# Patient Record
Sex: FEMALE | Race: BLACK OR AFRICAN AMERICAN | NOT HISPANIC OR LATINO | Employment: OTHER | URBAN - METROPOLITAN AREA
[De-identification: names, ages, dates, MRNs, and addresses within clinical notes are randomized per-mention and may not be internally consistent; named-entity substitution may affect disease eponyms.]

---

## 2013-09-17 LAB — CRC RECOMMENDATION EXT: NORMAL

## 2017-01-30 ENCOUNTER — HISTORICAL (OUTPATIENT)
Dept: LAB | Facility: HOSPITAL | Age: 62
End: 2017-01-30

## 2017-03-31 ENCOUNTER — HISTORICAL (OUTPATIENT)
Dept: LAB | Facility: HOSPITAL | Age: 62
End: 2017-03-31

## 2017-05-31 ENCOUNTER — HISTORICAL (OUTPATIENT)
Dept: ADMINISTRATIVE | Facility: HOSPITAL | Age: 62
End: 2017-05-31

## 2017-06-20 ENCOUNTER — HISTORICAL (OUTPATIENT)
Dept: ADMINISTRATIVE | Facility: HOSPITAL | Age: 62
End: 2017-06-20

## 2017-07-07 ENCOUNTER — HISTORICAL (OUTPATIENT)
Dept: ADMINISTRATIVE | Facility: HOSPITAL | Age: 62
End: 2017-07-07

## 2017-07-07 LAB
ALBUMIN SERPL-MCNC: 4 GM/DL (ref 3.4–5)
ALBUMIN/GLOB SERPL: 1 {RATIO}
ALP SERPL-CCNC: 76 UNIT/L (ref 38–126)
ALT SERPL-CCNC: 27 UNIT/L (ref 12–78)
AST SERPL-CCNC: 24 UNIT/L (ref 15–37)
BILIRUB SERPL-MCNC: 0.4 MG/DL (ref 0.2–1)
BILIRUBIN DIRECT+TOT PNL SERPL-MCNC: 0.1 MG/DL (ref 0–0.5)
BILIRUBIN DIRECT+TOT PNL SERPL-MCNC: 0.3 MG/DL (ref 0–0.8)
BUN SERPL-MCNC: 21 MG/DL (ref 7–18)
CALCIUM SERPL-MCNC: 9.2 MG/DL (ref 8.5–10.1)
CHLORIDE SERPL-SCNC: 107 MMOL/L (ref 98–107)
CHOLEST SERPL-MCNC: 269 MG/DL (ref 0–200)
CHOLEST/HDLC SERPL: 3.9 {RATIO} (ref 0–4)
CO2 SERPL-SCNC: 24 MMOL/L (ref 21–32)
CREAT SERPL-MCNC: 1.38 MG/DL (ref 0.55–1.02)
CREAT UR-MCNC: 290 MG/DL
EST. AVERAGE GLUCOSE BLD GHB EST-MCNC: 128 MG/DL
GLOBULIN SER-MCNC: 4 GM/DL (ref 2.4–3.5)
GLUCOSE SERPL-MCNC: 83 MG/DL (ref 74–106)
HBA1C MFR BLD: 6.1 % (ref 4.2–6.3)
HDLC SERPL-MCNC: 69 MG/DL (ref 35–60)
LDLC SERPL CALC-MCNC: 170 MG/DL (ref 0–129)
MICROALBUMIN UR-MCNC: 2.8 MG/DL
MICROALBUMIN/CREAT RATIO PNL UR: 9.7 MG/GM CR (ref 0–30)
POTASSIUM SERPL-SCNC: 4.8 MMOL/L (ref 3.5–5.1)
PROT SERPL-MCNC: 8 GM/DL (ref 6.4–8.2)
SODIUM SERPL-SCNC: 139 MMOL/L (ref 136–145)
TRIGL SERPL-MCNC: 151 MG/DL (ref 30–150)
VLDLC SERPL CALC-MCNC: 30 MG/DL

## 2017-07-12 ENCOUNTER — HISTORICAL (OUTPATIENT)
Dept: ADMINISTRATIVE | Facility: HOSPITAL | Age: 62
End: 2017-07-12

## 2017-07-19 ENCOUNTER — HISTORICAL (OUTPATIENT)
Dept: ADMINISTRATIVE | Facility: HOSPITAL | Age: 62
End: 2017-07-19

## 2017-08-02 ENCOUNTER — HISTORICAL (OUTPATIENT)
Dept: ADMINISTRATIVE | Facility: HOSPITAL | Age: 62
End: 2017-08-02

## 2017-08-16 ENCOUNTER — HISTORICAL (OUTPATIENT)
Dept: ADMINISTRATIVE | Facility: HOSPITAL | Age: 62
End: 2017-08-16

## 2017-08-30 ENCOUNTER — HISTORICAL (OUTPATIENT)
Dept: ADMINISTRATIVE | Facility: HOSPITAL | Age: 62
End: 2017-08-30

## 2017-11-06 ENCOUNTER — HISTORICAL (OUTPATIENT)
Dept: RADIOLOGY | Facility: HOSPITAL | Age: 62
End: 2017-11-06

## 2018-04-03 ENCOUNTER — HISTORICAL (OUTPATIENT)
Dept: LAB | Facility: HOSPITAL | Age: 63
End: 2018-04-03

## 2018-04-03 LAB
ABS NEUT (OLG): 4.76 X10(3)/MCL (ref 2.1–9.2)
ALBUMIN SERPL-MCNC: 4.3 GM/DL (ref 3.4–5)
ALBUMIN/GLOB SERPL: 1.1 {RATIO}
ALP SERPL-CCNC: 73 UNIT/L (ref 38–126)
ALT SERPL-CCNC: 17 UNIT/L (ref 12–78)
APPEARANCE, UA: ABNORMAL
AST SERPL-CCNC: 15 UNIT/L (ref 15–37)
BACTERIA SPEC CULT: ABNORMAL /HPF
BASOPHILS # BLD AUTO: 0 X10(3)/MCL (ref 0–0.2)
BASOPHILS NFR BLD AUTO: 0 %
BILIRUB SERPL-MCNC: 0.6 MG/DL (ref 0.2–1)
BILIRUB UR QL STRIP: NEGATIVE
BILIRUBIN DIRECT+TOT PNL SERPL-MCNC: 0.1 MG/DL (ref 0–0.2)
BILIRUBIN DIRECT+TOT PNL SERPL-MCNC: 0.5 MG/DL (ref 0–0.8)
BUN SERPL-MCNC: 20 MG/DL (ref 7–18)
CALCIUM SERPL-MCNC: 9.3 MG/DL (ref 8.5–10.1)
CHLORIDE SERPL-SCNC: 109 MMOL/L (ref 98–107)
CHOLEST SERPL-MCNC: 282 MG/DL (ref 0–200)
CHOLEST/HDLC SERPL: 4.4 {RATIO} (ref 0–4)
CK SERPL-CCNC: 81 UNIT/L (ref 26–192)
CO2 SERPL-SCNC: 24 MMOL/L (ref 21–32)
COLOR UR: YELLOW
CREAT SERPL-MCNC: 1.11 MG/DL (ref 0.55–1.02)
CREAT UR-MCNC: 233 MG/DL
DEPRECATED CALCIDIOL+CALCIFEROL SERPL-MC: 36 NG/ML (ref 30–80)
EOSINOPHIL # BLD AUTO: 0.2 X10(3)/MCL (ref 0–0.9)
EOSINOPHIL NFR BLD AUTO: 3 %
ERYTHROCYTE [DISTWIDTH] IN BLOOD BY AUTOMATED COUNT: 14.1 % (ref 11.5–17)
EST. AVERAGE GLUCOSE BLD GHB EST-MCNC: 123 MG/DL
GLOBULIN SER-MCNC: 4 GM/DL (ref 2.4–3.5)
GLUCOSE (UA): NEGATIVE
GLUCOSE SERPL-MCNC: 93 MG/DL (ref 74–106)
HBA1C MFR BLD: 5.9 % (ref 4.2–6.3)
HCT VFR BLD AUTO: 30.4 % (ref 37–47)
HDLC SERPL-MCNC: 64 MG/DL (ref 35–60)
HGB BLD-MCNC: 10.4 GM/DL (ref 12–16)
HGB UR QL STRIP: NEGATIVE
HYALINE CASTS #/AREA URNS LPF: ABNORMAL /[LPF]
KETONES UR QL STRIP: NEGATIVE
LDLC SERPL CALC-MCNC: 197 MG/DL (ref 0–129)
LEUKOCYTE ESTERASE UR QL STRIP: ABNORMAL
LYMPHOCYTES # BLD AUTO: 2.4 X10(3)/MCL (ref 0.6–4.6)
LYMPHOCYTES NFR BLD AUTO: 30 %
MCH RBC QN AUTO: 28.3 PG (ref 27–31)
MCHC RBC AUTO-ENTMCNC: 34.2 GM/DL (ref 33–36)
MCV RBC AUTO: 82.8 FL (ref 80–94)
MICROALBUMIN UR-MCNC: 2.3 MG/DL
MICROALBUMIN/CREAT RATIO PNL UR: 9.9 MG/GM CR (ref 0–30)
MONOCYTES # BLD AUTO: 0.4 X10(3)/MCL (ref 0.1–1.3)
MONOCYTES NFR BLD AUTO: 6 %
MUCOUS THREADS URNS QL MICRO: ABNORMAL
NEUTROPHILS # BLD AUTO: 4.76 X10(3)/MCL (ref 1.4–7.9)
NEUTROPHILS NFR BLD AUTO: 61 %
NITRITE UR QL STRIP: NEGATIVE
PH UR STRIP: 6 [PH] (ref 5–9)
PLATELET # BLD AUTO: 356 X10(3)/MCL (ref 130–400)
PMV BLD AUTO: 11.4 FL (ref 9.4–12.4)
POTASSIUM SERPL-SCNC: 4.1 MMOL/L (ref 3.5–5.1)
PROT SERPL-MCNC: 8.3 GM/DL (ref 6.4–8.2)
PROT UR QL STRIP: NEGATIVE
RBC # BLD AUTO: 3.67 X10(6)/MCL (ref 4.2–5.4)
RBC #/AREA URNS HPF: ABNORMAL /[HPF]
SODIUM SERPL-SCNC: 139 MMOL/L (ref 136–145)
SP GR UR STRIP: 1.02 (ref 1–1.03)
SQUAMOUS EPITHELIAL, UA: ABNORMAL
T4 FREE SERPL-MCNC: 0.85 NG/DL (ref 0.76–1.46)
TRIGL SERPL-MCNC: 105 MG/DL (ref 30–150)
TSH SERPL-ACNC: 0.43 MIU/ML (ref 0.36–3.74)
UA WBC MAN: ABNORMAL /HPF
UROBILINOGEN UR STRIP-ACNC: 0.2
VLDLC SERPL CALC-MCNC: 21 MG/DL
WBC # SPEC AUTO: 7.8 X10(3)/MCL (ref 4.5–11.5)

## 2018-04-06 ENCOUNTER — HISTORICAL (OUTPATIENT)
Dept: RADIOLOGY | Facility: HOSPITAL | Age: 63
End: 2018-04-06

## 2018-07-16 ENCOUNTER — HISTORICAL (OUTPATIENT)
Dept: RADIOLOGY | Facility: HOSPITAL | Age: 63
End: 2018-07-16

## 2018-10-03 ENCOUNTER — HISTORICAL (OUTPATIENT)
Dept: LAB | Facility: HOSPITAL | Age: 63
End: 2018-10-03

## 2018-10-03 LAB
ABS NEUT (OLG): 5.34 X10(3)/MCL (ref 2.1–9.2)
ALBUMIN SERPL-MCNC: 4.6 GM/DL (ref 3.4–5)
ALBUMIN/GLOB SERPL: 1 RATIO (ref 1.1–2)
ALP SERPL-CCNC: 72 UNIT/L (ref 38–126)
ALT SERPL-CCNC: 19 UNIT/L (ref 12–78)
AST SERPL-CCNC: 14 UNIT/L (ref 15–37)
BASOPHILS # BLD AUTO: 0 X10(3)/MCL (ref 0–0.2)
BASOPHILS NFR BLD AUTO: 0 %
BILIRUB SERPL-MCNC: 0.6 MG/DL (ref 0.2–1)
BILIRUBIN DIRECT+TOT PNL SERPL-MCNC: 0.1 MG/DL (ref 0–0.5)
BILIRUBIN DIRECT+TOT PNL SERPL-MCNC: 0.5 MG/DL (ref 0–0.8)
BUN SERPL-MCNC: 19 MG/DL (ref 7–18)
CALCIUM SERPL-MCNC: 10 MG/DL (ref 8.5–10.1)
CHLORIDE SERPL-SCNC: 105 MMOL/L (ref 98–107)
CHOLEST SERPL-MCNC: 301 MG/DL (ref 0–200)
CHOLEST/HDLC SERPL: 4.3 {RATIO} (ref 0–4)
CK SERPL-CCNC: 77 UNIT/L (ref 26–192)
CO2 SERPL-SCNC: 24 MMOL/L (ref 21–32)
CREAT SERPL-MCNC: 1.36 MG/DL (ref 0.55–1.02)
EOSINOPHIL # BLD AUTO: 0.2 X10(3)/MCL (ref 0–0.9)
EOSINOPHIL NFR BLD AUTO: 2 %
ERYTHROCYTE [DISTWIDTH] IN BLOOD BY AUTOMATED COUNT: 14 % (ref 11.5–17)
EST. AVERAGE GLUCOSE BLD GHB EST-MCNC: 117 MG/DL
GLOBULIN SER-MCNC: 4.6 GM/DL (ref 2.4–3.5)
GLUCOSE SERPL-MCNC: 109 MG/DL (ref 74–106)
HBA1C MFR BLD: 5.7 % (ref 4.2–6.3)
HCT VFR BLD AUTO: 34.8 % (ref 37–47)
HDLC SERPL-MCNC: 70 MG/DL (ref 35–60)
HGB BLD-MCNC: 11.8 GM/DL (ref 12–16)
IRON SERPL-MCNC: 89 MCG/DL (ref 50–175)
LDLC SERPL CALC-MCNC: 204 MG/DL (ref 0–129)
LYMPHOCYTES # BLD AUTO: 2.3 X10(3)/MCL (ref 0.6–4.6)
LYMPHOCYTES NFR BLD AUTO: 27 %
MCH RBC QN AUTO: 27.8 PG (ref 27–31)
MCHC RBC AUTO-ENTMCNC: 33.9 GM/DL (ref 33–36)
MCV RBC AUTO: 82.1 FL (ref 80–94)
MONOCYTES # BLD AUTO: 0.5 X10(3)/MCL (ref 0.1–1.3)
MONOCYTES NFR BLD AUTO: 6 %
NEUTROPHILS # BLD AUTO: 5.34 X10(3)/MCL (ref 2.1–9.2)
NEUTROPHILS NFR BLD AUTO: 64 %
PLATELET # BLD AUTO: 355 X10(3)/MCL (ref 130–400)
PMV BLD AUTO: 10.8 FL (ref 9.4–12.4)
POTASSIUM SERPL-SCNC: 3.9 MMOL/L (ref 3.5–5.1)
PROT SERPL-MCNC: 9.2 GM/DL (ref 6.4–8.2)
RBC # BLD AUTO: 4.24 X10(6)/MCL (ref 4.2–5.4)
SODIUM SERPL-SCNC: 139 MMOL/L (ref 136–145)
TRIGL SERPL-MCNC: 135 MG/DL (ref 30–150)
VLDLC SERPL CALC-MCNC: 27 MG/DL
WBC # SPEC AUTO: 8.4 X10(3)/MCL (ref 4.5–11.5)

## 2018-11-30 ENCOUNTER — HISTORICAL (OUTPATIENT)
Dept: LAB | Facility: HOSPITAL | Age: 63
End: 2018-11-30

## 2018-11-30 LAB
APPEARANCE, UA: ABNORMAL
BACTERIA SPEC CULT: ABNORMAL /HPF
BILIRUB UR QL STRIP: NEGATIVE
COLOR UR: YELLOW
GLUCOSE (UA): NEGATIVE
HGB UR QL STRIP: ABNORMAL
KETONES UR QL STRIP: NEGATIVE
LEUKOCYTE ESTERASE UR QL STRIP: ABNORMAL
NITRITE UR QL STRIP: NEGATIVE
PH UR STRIP: 5.5 [PH] (ref 5–9)
PROT UR QL STRIP: ABNORMAL
RBC #/AREA URNS HPF: 15 /HPF (ref 0–2)
SP GR UR STRIP: 1.02 (ref 1–1.03)
SQUAMOUS EPITHELIAL, UA: ABNORMAL
UROBILINOGEN UR STRIP-ACNC: 0.2
WBC #/AREA URNS HPF: 75 /HPF (ref 0–3)

## 2019-04-09 ENCOUNTER — HISTORICAL (OUTPATIENT)
Dept: LAB | Facility: HOSPITAL | Age: 64
End: 2019-04-09

## 2019-04-09 LAB
ABS NEUT (OLG): 3.34 X10(3)/MCL (ref 2.1–9.2)
ALBUMIN SERPL-MCNC: 4.2 GM/DL (ref 3.4–5)
ALBUMIN/GLOB SERPL: 1 {RATIO}
ALP SERPL-CCNC: 70 UNIT/L (ref 38–126)
ALT SERPL-CCNC: 20 UNIT/L (ref 12–78)
APPEARANCE, UA: CLEAR
AST SERPL-CCNC: 13 UNIT/L (ref 15–37)
BACTERIA SPEC CULT: NORMAL /HPF
BASOPHILS # BLD AUTO: 0 X10(3)/MCL (ref 0–0.2)
BASOPHILS NFR BLD AUTO: 0 %
BILIRUB SERPL-MCNC: 0.2 MG/DL (ref 0.2–1)
BILIRUB UR QL STRIP: NEGATIVE
BILIRUBIN DIRECT+TOT PNL SERPL-MCNC: 0.1 MG/DL (ref 0–0.2)
BILIRUBIN DIRECT+TOT PNL SERPL-MCNC: 0.1 MG/DL (ref 0–0.8)
BUN SERPL-MCNC: 22 MG/DL (ref 7–18)
CALCIUM SERPL-MCNC: 9.6 MG/DL (ref 8.5–10.1)
CHLORIDE SERPL-SCNC: 107 MMOL/L (ref 98–107)
CHOLEST SERPL-MCNC: 257 MG/DL (ref 0–200)
CHOLEST/HDLC SERPL: 4.4 {RATIO} (ref 0–4)
CK SERPL-CCNC: 92 UNIT/L (ref 26–192)
CO2 SERPL-SCNC: 24 MMOL/L (ref 21–32)
COLOR UR: YELLOW
CREAT SERPL-MCNC: 1.08 MG/DL (ref 0.55–1.02)
CREAT UR-MCNC: 90 MG/DL
DEPRECATED CALCIDIOL+CALCIFEROL SERPL-MC: 38.94 NG/ML (ref 30–80)
EOSINOPHIL # BLD AUTO: 0.3 X10(3)/MCL (ref 0–0.9)
EOSINOPHIL NFR BLD AUTO: 5 %
ERYTHROCYTE [DISTWIDTH] IN BLOOD BY AUTOMATED COUNT: 13.3 % (ref 11.5–17)
EST. AVERAGE GLUCOSE BLD GHB EST-MCNC: 128 MG/DL
GLOBULIN SER-MCNC: 4.1 GM/DL (ref 2.4–3.5)
GLUCOSE (UA): NEGATIVE
GLUCOSE SERPL-MCNC: 98 MG/DL (ref 74–106)
HBA1C MFR BLD: 6.1 % (ref 4.2–6.3)
HCT VFR BLD AUTO: 32.7 % (ref 37–47)
HDLC SERPL-MCNC: 59 MG/DL (ref 35–60)
HGB BLD-MCNC: 11 GM/DL (ref 12–16)
HGB UR QL STRIP: NEGATIVE
KETONES UR QL STRIP: NEGATIVE
LDLC SERPL CALC-MCNC: 163 MG/DL (ref 0–129)
LEUKOCYTE ESTERASE UR QL STRIP: NEGATIVE
LYMPHOCYTES # BLD AUTO: 2 X10(3)/MCL (ref 0.6–4.6)
LYMPHOCYTES NFR BLD AUTO: 32 %
MCH RBC QN AUTO: 27.9 PG (ref 27–31)
MCHC RBC AUTO-ENTMCNC: 33.6 GM/DL (ref 33–36)
MCV RBC AUTO: 83 FL (ref 80–94)
MICROALBUMIN UR-MCNC: <0.5 MG/DL
MICROALBUMIN/CREAT RATIO PNL UR: <5.6 MG/GM CR (ref 0–30)
MONOCYTES # BLD AUTO: 0.4 X10(3)/MCL (ref 0.1–1.3)
MONOCYTES NFR BLD AUTO: 7 %
NEUTROPHILS # BLD AUTO: 3.34 X10(3)/MCL (ref 2.1–9.2)
NEUTROPHILS NFR BLD AUTO: 55 %
NITRITE UR QL STRIP: NEGATIVE
PH UR STRIP: 7.5 [PH] (ref 5–9)
PLATELET # BLD AUTO: 323 X10(3)/MCL (ref 130–400)
PMV BLD AUTO: 11.7 FL (ref 9.4–12.4)
POTASSIUM SERPL-SCNC: 4.6 MMOL/L (ref 3.5–5.1)
PROT SERPL-MCNC: 8.3 GM/DL (ref 6.4–8.2)
PROT UR QL STRIP: NEGATIVE
RBC # BLD AUTO: 3.94 X10(6)/MCL (ref 4.2–5.4)
RBC #/AREA URNS HPF: NORMAL /HPF (ref 0–2)
SODIUM SERPL-SCNC: 139 MMOL/L (ref 136–145)
SP GR UR STRIP: 1.02 (ref 1–1.03)
SQUAMOUS EPITHELIAL, UA: NORMAL
T4 FREE SERPL-MCNC: 0.84 NG/DL (ref 0.76–1.46)
TRIGL SERPL-MCNC: 177 MG/DL (ref 30–150)
TSH SERPL-ACNC: 0.31 MIU/L (ref 0.36–3.74)
URATE SERPL-MCNC: 5.1 MG/DL (ref 2.6–7.2)
UROBILINOGEN UR STRIP-ACNC: 0.2
VLDLC SERPL CALC-MCNC: 35 MG/DL
WBC # SPEC AUTO: 6 X10(3)/MCL (ref 4.5–11.5)
WBC #/AREA URNS HPF: NORMAL /[HPF]

## 2019-06-03 ENCOUNTER — HISTORICAL (OUTPATIENT)
Dept: RADIOLOGY | Facility: HOSPITAL | Age: 64
End: 2019-06-03

## 2019-08-20 ENCOUNTER — HISTORICAL (OUTPATIENT)
Dept: LAB | Facility: HOSPITAL | Age: 64
End: 2019-08-20

## 2019-08-20 LAB
ABS NEUT (OLG): 4.01 X10(3)/MCL (ref 2.1–9.2)
ALBUMIN SERPL-MCNC: 4.2 GM/DL (ref 3.4–5)
ALBUMIN/GLOB SERPL: 1.1 RATIO (ref 1.1–2)
ALP SERPL-CCNC: 70 UNIT/L (ref 38–126)
ALT SERPL-CCNC: 18 UNIT/L (ref 12–78)
APPEARANCE, UA: CLEAR
AST SERPL-CCNC: 10 UNIT/L (ref 15–37)
BACTERIA SPEC CULT: ABNORMAL /HPF
BASOPHILS # BLD AUTO: 0 X10(3)/MCL (ref 0–0.2)
BASOPHILS NFR BLD AUTO: 0 %
BILIRUB SERPL-MCNC: 0.3 MG/DL (ref 0.2–1)
BILIRUB UR QL STRIP: NEGATIVE
BILIRUBIN DIRECT+TOT PNL SERPL-MCNC: 0.1 MG/DL (ref 0–0.5)
BILIRUBIN DIRECT+TOT PNL SERPL-MCNC: 0.2 MG/DL (ref 0–0.8)
BUN SERPL-MCNC: 10 MG/DL (ref 7–18)
CALCIUM SERPL-MCNC: 9.3 MG/DL (ref 8.5–10.1)
CHLORIDE SERPL-SCNC: 107 MMOL/L (ref 98–107)
CHOLEST SERPL-MCNC: 245 MG/DL (ref 0–200)
CHOLEST/HDLC SERPL: 4.1 {RATIO} (ref 0–4)
CK SERPL-CCNC: 68 UNIT/L (ref 26–192)
CO2 SERPL-SCNC: 27 MMOL/L (ref 21–32)
COLOR UR: YELLOW
CREAT SERPL-MCNC: 0.92 MG/DL (ref 0.55–1.02)
EOSINOPHIL # BLD AUTO: 0.3 X10(3)/MCL (ref 0–0.9)
EOSINOPHIL NFR BLD AUTO: 4 %
ERYTHROCYTE [DISTWIDTH] IN BLOOD BY AUTOMATED COUNT: 14 % (ref 11.5–17)
GLOBULIN SER-MCNC: 3.7 GM/DL (ref 2.4–3.5)
GLUCOSE (UA): NEGATIVE
GLUCOSE SERPL-MCNC: 101 MG/DL (ref 74–106)
HCT VFR BLD AUTO: 35.4 % (ref 37–47)
HDLC SERPL-MCNC: 60 MG/DL (ref 35–60)
HGB BLD-MCNC: 12 GM/DL (ref 12–16)
HGB UR QL STRIP: NEGATIVE
IRON SERPL-MCNC: 78 MCG/DL (ref 50–175)
KETONES UR QL STRIP: NEGATIVE
LDLC SERPL CALC-MCNC: 137 MG/DL (ref 0–129)
LEUKOCYTE ESTERASE UR QL STRIP: NEGATIVE
LYMPHOCYTES # BLD AUTO: 2.5 X10(3)/MCL (ref 0.6–4.6)
LYMPHOCYTES NFR BLD AUTO: 34 %
MCH RBC QN AUTO: 27.6 PG (ref 27–31)
MCHC RBC AUTO-ENTMCNC: 33.9 GM/DL (ref 33–36)
MCV RBC AUTO: 81.6 FL (ref 80–94)
MONOCYTES # BLD AUTO: 0.5 X10(3)/MCL (ref 0.1–1.3)
MONOCYTES NFR BLD AUTO: 6 %
NEUTROPHILS # BLD AUTO: 4.01 X10(3)/MCL (ref 2.1–9.2)
NEUTROPHILS NFR BLD AUTO: 55 %
NITRITE UR QL STRIP: NEGATIVE
PH UR STRIP: 5.5 [PH] (ref 5–9)
PLATELET # BLD AUTO: 333 X10(3)/MCL (ref 130–400)
PMV BLD AUTO: 11.8 FL (ref 9.4–12.4)
POTASSIUM SERPL-SCNC: 3.3 MMOL/L (ref 3.5–5.1)
PROT SERPL-MCNC: 7.9 GM/DL (ref 6.4–8.2)
PROT UR QL STRIP: NEGATIVE
RBC # BLD AUTO: 4.34 X10(6)/MCL (ref 4.2–5.4)
RBC #/AREA URNS HPF: ABNORMAL /[HPF]
SODIUM SERPL-SCNC: 141 MMOL/L (ref 136–145)
SP GR UR STRIP: 1.02 (ref 1–1.03)
SQUAMOUS EPITHELIAL, UA: ABNORMAL
T4 FREE SERPL-MCNC: 0.79 NG/DL (ref 0.76–1.46)
TRIGL SERPL-MCNC: 241 MG/DL (ref 30–150)
TSH SERPL-ACNC: 0.77 MIU/L (ref 0.36–3.74)
UROBILINOGEN UR STRIP-ACNC: 0.2
VLDLC SERPL CALC-MCNC: 48 MG/DL
WBC # SPEC AUTO: 7.3 X10(3)/MCL (ref 4.5–11.5)
WBC #/AREA URNS HPF: ABNORMAL /[HPF]

## 2019-09-09 ENCOUNTER — HISTORICAL (OUTPATIENT)
Dept: RADIOLOGY | Facility: HOSPITAL | Age: 64
End: 2019-09-09

## 2019-10-21 ENCOUNTER — HISTORICAL (OUTPATIENT)
Dept: LAB | Facility: HOSPITAL | Age: 64
End: 2019-10-21

## 2019-10-21 LAB
ABS NEUT (OLG): 4.33 X10(3)/MCL (ref 2.1–9.2)
ALBUMIN SERPL-MCNC: 4.4 GM/DL (ref 3.4–5)
ALBUMIN/GLOB SERPL: 1.1 {RATIO}
ALP SERPL-CCNC: 84 UNIT/L (ref 38–126)
ALT SERPL-CCNC: 19 UNIT/L (ref 12–78)
AST SERPL-CCNC: 14 UNIT/L (ref 15–37)
BASOPHILS # BLD AUTO: 0 X10(3)/MCL (ref 0–0.2)
BASOPHILS NFR BLD AUTO: 0 %
BILIRUB SERPL-MCNC: 0.4 MG/DL (ref 0.2–1)
BILIRUBIN DIRECT+TOT PNL SERPL-MCNC: 0.1 MG/DL (ref 0–0.2)
BILIRUBIN DIRECT+TOT PNL SERPL-MCNC: 0.3 MG/DL (ref 0–0.8)
BUN SERPL-MCNC: 19 MG/DL (ref 7–18)
CALCIUM SERPL-MCNC: 9.6 MG/DL (ref 8.5–10.1)
CHLORIDE SERPL-SCNC: 104 MMOL/L (ref 98–107)
CHOLEST SERPL-MCNC: 302 MG/DL (ref 0–200)
CHOLEST/HDLC SERPL: 4.9 {RATIO} (ref 0–4)
CK SERPL-CCNC: 79 UNIT/L (ref 26–192)
CO2 SERPL-SCNC: 27 MMOL/L (ref 21–32)
CREAT SERPL-MCNC: 1.2 MG/DL (ref 0.55–1.02)
EOSINOPHIL # BLD AUTO: 0.4 X10(3)/MCL (ref 0–0.9)
EOSINOPHIL NFR BLD AUTO: 5 %
ERYTHROCYTE [DISTWIDTH] IN BLOOD BY AUTOMATED COUNT: 14.1 % (ref 11.5–17)
EST. AVERAGE GLUCOSE BLD GHB EST-MCNC: 120 MG/DL
GLOBULIN SER-MCNC: 4.1 GM/DL (ref 2.4–3.5)
GLUCOSE SERPL-MCNC: 106 MG/DL (ref 74–106)
HBA1C MFR BLD: 5.8 % (ref 4.2–6.3)
HCT VFR BLD AUTO: 34.7 % (ref 37–47)
HDLC SERPL-MCNC: 62 MG/DL (ref 35–60)
HGB BLD-MCNC: 11.6 GM/DL (ref 12–16)
IRON SERPL-MCNC: 56 MCG/DL (ref 50–175)
LDLC SERPL CALC-MCNC: 197 MG/DL (ref 0–129)
LYMPHOCYTES # BLD AUTO: 2.5 X10(3)/MCL (ref 0.6–4.6)
LYMPHOCYTES NFR BLD AUTO: 32 %
MCH RBC QN AUTO: 27.3 PG (ref 27–31)
MCHC RBC AUTO-ENTMCNC: 33.4 GM/DL (ref 33–36)
MCV RBC AUTO: 81.6 FL (ref 80–94)
MONOCYTES # BLD AUTO: 0.4 X10(3)/MCL (ref 0.1–1.3)
MONOCYTES NFR BLD AUTO: 6 %
NEUTROPHILS # BLD AUTO: 4.33 X10(3)/MCL (ref 2.1–9.2)
NEUTROPHILS NFR BLD AUTO: 56 %
PLATELET # BLD AUTO: 334 X10(3)/MCL (ref 130–400)
PMV BLD AUTO: 11 FL (ref 9.4–12.4)
POTASSIUM SERPL-SCNC: 3.7 MMOL/L (ref 3.5–5.1)
PROT SERPL-MCNC: 8.5 GM/DL (ref 6.4–8.2)
RBC # BLD AUTO: 4.25 X10(6)/MCL (ref 4.2–5.4)
SODIUM SERPL-SCNC: 137 MMOL/L (ref 136–145)
T4 FREE SERPL-MCNC: 0.88 NG/DL (ref 0.76–1.46)
TRIGL SERPL-MCNC: 215 MG/DL (ref 30–150)
TSH SERPL-ACNC: 0.45 MIU/L (ref 0.36–3.74)
VLDLC SERPL CALC-MCNC: 43 MG/DL
WBC # SPEC AUTO: 7.7 X10(3)/MCL (ref 4.5–11.5)

## 2020-01-09 ENCOUNTER — HISTORICAL (OUTPATIENT)
Dept: LAB | Facility: HOSPITAL | Age: 65
End: 2020-01-09

## 2020-01-09 ENCOUNTER — HISTORICAL (OUTPATIENT)
Dept: RADIOLOGY | Facility: HOSPITAL | Age: 65
End: 2020-01-09

## 2020-01-09 LAB
ABS NEUT (OLG): 3.36 X10(3)/MCL (ref 2.1–9.2)
BASOPHILS # BLD AUTO: 0 X10(3)/MCL (ref 0–0.2)
BASOPHILS NFR BLD AUTO: 1 %
CHOLEST SERPL-MCNC: 260 MG/DL (ref 0–200)
CHOLEST/HDLC SERPL: 4 {RATIO} (ref 0–4)
EOSINOPHIL # BLD AUTO: 0.3 X10(3)/MCL (ref 0–0.9)
EOSINOPHIL NFR BLD AUTO: 5 %
ERYTHROCYTE [DISTWIDTH] IN BLOOD BY AUTOMATED COUNT: 13.3 % (ref 11.5–17)
HCT VFR BLD AUTO: 33.3 % (ref 37–47)
HDLC SERPL-MCNC: 65 MG/DL (ref 35–60)
HGB BLD-MCNC: 11.5 GM/DL (ref 12–16)
IRON SERPL-MCNC: 71 MCG/DL (ref 50–175)
LDLC SERPL CALC-MCNC: 166 MG/DL (ref 0–129)
LYMPHOCYTES # BLD AUTO: 2.4 X10(3)/MCL (ref 0.6–4.6)
LYMPHOCYTES NFR BLD AUTO: 37 %
MCH RBC QN AUTO: 28.3 PG (ref 27–31)
MCHC RBC AUTO-ENTMCNC: 34.5 GM/DL (ref 33–36)
MCV RBC AUTO: 81.8 FL (ref 80–94)
MONOCYTES # BLD AUTO: 0.4 X10(3)/MCL (ref 0.1–1.3)
MONOCYTES NFR BLD AUTO: 6 %
NEUTROPHILS # BLD AUTO: 3.36 X10(3)/MCL (ref 2.1–9.2)
NEUTROPHILS NFR BLD AUTO: 51 %
PLATELET # BLD AUTO: 374 X10(3)/MCL (ref 130–400)
PMV BLD AUTO: 11.9 FL (ref 9.4–12.4)
RBC # BLD AUTO: 4.07 X10(6)/MCL (ref 4.2–5.4)
TRIGL SERPL-MCNC: 144 MG/DL (ref 30–150)
VLDLC SERPL CALC-MCNC: 29 MG/DL
WBC # SPEC AUTO: 6.5 X10(3)/MCL (ref 4.5–11.5)

## 2020-07-21 ENCOUNTER — HISTORICAL (OUTPATIENT)
Dept: ADMINISTRATIVE | Facility: HOSPITAL | Age: 65
End: 2020-07-21

## 2020-07-21 LAB
ABS NEUT (OLG): 4.15 X10(3)/MCL (ref 2.1–9.2)
ALBUMIN SERPL-MCNC: 3.8 GM/DL (ref 3.4–5)
ALBUMIN/GLOB SERPL: 1 RATIO (ref 1.1–2)
ALP SERPL-CCNC: 53 UNIT/L (ref 40–150)
ALT SERPL-CCNC: 20 UNIT/L (ref 0–55)
APPEARANCE, UA: CLEAR
AST SERPL-CCNC: 28 UNIT/L (ref 5–34)
BACTERIA SPEC CULT: NORMAL /HPF
BASOPHILS # BLD AUTO: 0 X10(3)/MCL (ref 0–0.2)
BASOPHILS NFR BLD AUTO: 0 %
BILIRUB SERPL-MCNC: 0.3 MG/DL
BILIRUB UR QL STRIP: NEGATIVE
BILIRUBIN DIRECT+TOT PNL SERPL-MCNC: 0.1 MG/DL (ref 0–0.5)
BILIRUBIN DIRECT+TOT PNL SERPL-MCNC: 0.2 MG/DL (ref 0–0.8)
BUN SERPL-MCNC: 15 MG/DL (ref 9.8–20.1)
CALCIUM SERPL-MCNC: 9.1 MG/DL (ref 8.4–10.2)
CHLORIDE SERPL-SCNC: 107 MMOL/L (ref 98–107)
CHOLEST SERPL-MCNC: 240 MG/DL
CHOLEST/HDLC SERPL: 5 {RATIO} (ref 0–5)
CK SERPL-CCNC: 85 U/L (ref 29–168)
CO2 SERPL-SCNC: 28 MMOL/L (ref 23–31)
COLOR UR: YELLOW
CREAT SERPL-MCNC: 0.96 MG/DL (ref 0.55–1.02)
CREAT UR-MCNC: 68.5 MG/DL (ref 45–106)
DEPRECATED CALCIDIOL+CALCIFEROL SERPL-MC: 56.8 NG/ML (ref 6.6–49.9)
EOSINOPHIL # BLD AUTO: 0.3 X10(3)/MCL (ref 0–0.9)
EOSINOPHIL NFR BLD AUTO: 3 %
ERYTHROCYTE [DISTWIDTH] IN BLOOD BY AUTOMATED COUNT: 13.7 % (ref 11.5–17)
EST. AVERAGE GLUCOSE BLD GHB EST-MCNC: 125.5 MG/DL
GLOBULIN SER-MCNC: 3.8 GM/DL (ref 2.4–3.5)
GLUCOSE (UA): NEGATIVE
GLUCOSE SERPL-MCNC: 86 MG/DL (ref 82–115)
HBA1C MFR BLD: 6 %
HCT VFR BLD AUTO: 28.2 % (ref 37–47)
HDLC SERPL-MCNC: 48 MG/DL (ref 35–60)
HGB BLD-MCNC: 9.7 GM/DL (ref 12–16)
HGB UR QL STRIP: NEGATIVE
KETONES UR QL STRIP: NEGATIVE
LDLC SERPL CALC-MCNC: 143 MG/DL (ref 50–140)
LEUKOCYTE ESTERASE UR QL STRIP: NEGATIVE
LYMPHOCYTES # BLD AUTO: 2.4 X10(3)/MCL (ref 0.6–4.6)
LYMPHOCYTES NFR BLD AUTO: 32 %
MCH RBC QN AUTO: 27.9 PG (ref 27–31)
MCHC RBC AUTO-ENTMCNC: 34.4 GM/DL (ref 33–36)
MCV RBC AUTO: 81 FL (ref 80–94)
MICROALBUMIN UR-MCNC: <5 UG/ML
MICROALBUMIN/CREAT RATIO PNL UR: <7.3 MG/GM CR (ref 0–30)
MONOCYTES # BLD AUTO: 0.7 X10(3)/MCL (ref 0.1–1.3)
MONOCYTES NFR BLD AUTO: 10 %
NEUTROPHILS # BLD AUTO: 4.15 X10(3)/MCL (ref 2.1–9.2)
NEUTROPHILS NFR BLD AUTO: 54 %
NITRITE UR QL STRIP: NEGATIVE
PH UR STRIP: 7 [PH] (ref 5–9)
PLATELET # BLD AUTO: 359 X10(3)/MCL (ref 130–400)
PMV BLD AUTO: 10.9 FL (ref 9.4–12.4)
POTASSIUM SERPL-SCNC: 4.6 MMOL/L (ref 3.5–5.1)
PROT SERPL-MCNC: 7.6 GM/DL (ref 5.8–7.6)
PROT UR QL STRIP: NEGATIVE
RBC # BLD AUTO: 3.48 X10(6)/MCL (ref 4.2–5.4)
RBC #/AREA URNS HPF: NORMAL /[HPF]
SODIUM SERPL-SCNC: 139 MMOL/L (ref 136–145)
SP GR UR STRIP: 1.01 (ref 1–1.03)
SQUAMOUS EPITHELIAL, UA: NORMAL
TRIGL SERPL-MCNC: 243 MG/DL (ref 37–140)
TSH SERPL-ACNC: 0.43 UIU/ML (ref 0.35–4.94)
UROBILINOGEN UR STRIP-ACNC: 0.2
VLDLC SERPL CALC-MCNC: 49 MG/DL
WBC # SPEC AUTO: 7.6 X10(3)/MCL (ref 4.5–11.5)
WBC #/AREA URNS HPF: NORMAL /[HPF]

## 2020-08-06 ENCOUNTER — HISTORICAL (OUTPATIENT)
Dept: RADIOLOGY | Facility: HOSPITAL | Age: 65
End: 2020-08-06

## 2020-09-01 ENCOUNTER — HISTORICAL (OUTPATIENT)
Dept: ADMINISTRATIVE | Facility: HOSPITAL | Age: 65
End: 2020-09-01

## 2020-09-01 LAB
ABS NEUT (OLG): 6.1 X10(3)/MCL (ref 2.1–9.2)
BASOPHILS # BLD AUTO: 0 X10(3)/MCL (ref 0–0.2)
BASOPHILS NFR BLD AUTO: 0 %
EOSINOPHIL # BLD AUTO: 0.5 X10(3)/MCL (ref 0–0.9)
EOSINOPHIL NFR BLD AUTO: 5 %
ERYTHROCYTE [DISTWIDTH] IN BLOOD BY AUTOMATED COUNT: 14 % (ref 11.5–17)
HCT VFR BLD AUTO: 34.3 % (ref 37–47)
HGB BLD-MCNC: 11.5 GM/DL (ref 12–16)
IRON SERPL-MCNC: 76 UG/DL (ref 50–170)
LYMPHOCYTES # BLD AUTO: 3.2 X10(3)/MCL (ref 0.6–4.6)
LYMPHOCYTES NFR BLD AUTO: 31 %
MCH RBC QN AUTO: 27.4 PG (ref 27–31)
MCHC RBC AUTO-ENTMCNC: 33.5 GM/DL (ref 33–36)
MCV RBC AUTO: 81.7 FL (ref 80–94)
MONOCYTES # BLD AUTO: 0.6 X10(3)/MCL (ref 0.1–1.3)
MONOCYTES NFR BLD AUTO: 6 %
NEUTROPHILS # BLD AUTO: 6.1 X10(3)/MCL (ref 2.1–9.2)
NEUTROPHILS NFR BLD AUTO: 58 %
PLATELET # BLD AUTO: 352 X10(3)/MCL (ref 130–400)
PMV BLD AUTO: 11.3 FL (ref 9.4–12.4)
RBC # BLD AUTO: 4.2 X10(6)/MCL (ref 4.2–5.4)
WBC # SPEC AUTO: 10.6 X10(3)/MCL (ref 4.5–11.5)

## 2020-09-11 ENCOUNTER — HISTORICAL (OUTPATIENT)
Dept: RADIOLOGY | Facility: HOSPITAL | Age: 65
End: 2020-09-11

## 2020-10-13 ENCOUNTER — HISTORICAL (OUTPATIENT)
Dept: RADIOLOGY | Facility: HOSPITAL | Age: 65
End: 2020-10-13

## 2021-01-21 ENCOUNTER — HISTORICAL (OUTPATIENT)
Dept: ADMINISTRATIVE | Facility: HOSPITAL | Age: 66
End: 2021-01-21

## 2021-01-21 LAB
ABS NEUT (OLG): 5.36 X10(3)/MCL (ref 2.1–9.2)
ALBUMIN SERPL-MCNC: 4.1 GM/DL (ref 3.4–4.8)
ALBUMIN/GLOB SERPL: 1.1 RATIO (ref 1.1–2)
ALP SERPL-CCNC: 54 UNIT/L (ref 40–150)
ALT SERPL-CCNC: 14 UNIT/L (ref 0–55)
AST SERPL-CCNC: 15 UNIT/L (ref 5–34)
BASOPHILS # BLD AUTO: 0 X10(3)/MCL (ref 0–0.2)
BASOPHILS NFR BLD AUTO: 0 %
BILIRUB SERPL-MCNC: 0.3 MG/DL
BILIRUBIN DIRECT+TOT PNL SERPL-MCNC: 0.1 MG/DL (ref 0–0.5)
BILIRUBIN DIRECT+TOT PNL SERPL-MCNC: 0.2 MG/DL (ref 0–0.8)
BUN SERPL-MCNC: 11.8 MG/DL (ref 9.8–20.1)
CALCIUM SERPL-MCNC: 9.3 MG/DL (ref 8.4–10.2)
CHLORIDE SERPL-SCNC: 108 MMOL/L (ref 98–107)
CHOLEST SERPL-MCNC: 282 MG/DL
CHOLEST/HDLC SERPL: 4 {RATIO} (ref 0–5)
CK SERPL-CCNC: 63 U/L (ref 29–168)
CO2 SERPL-SCNC: 20 MMOL/L (ref 23–31)
CREAT SERPL-MCNC: 0.89 MG/DL (ref 0.55–1.02)
EOSINOPHIL # BLD AUTO: 0.1 X10(3)/MCL (ref 0–0.9)
EOSINOPHIL NFR BLD AUTO: 2 %
ERYTHROCYTE [DISTWIDTH] IN BLOOD BY AUTOMATED COUNT: 13.8 % (ref 11.5–17)
EST. AVERAGE GLUCOSE BLD GHB EST-MCNC: 111.2 MG/DL
GLOBULIN SER-MCNC: 3.6 GM/DL (ref 2.4–3.5)
GLUCOSE SERPL-MCNC: 116 MG/DL (ref 82–115)
HBA1C MFR BLD: 5.5 %
HCT VFR BLD AUTO: 29.3 % (ref 37–47)
HDLC SERPL-MCNC: 67 MG/DL (ref 35–60)
HGB BLD-MCNC: 10 GM/DL (ref 12–16)
IRON SERPL-MCNC: 68 UG/DL (ref 50–170)
LDLC SERPL CALC-MCNC: 186 MG/DL (ref 50–140)
LYMPHOCYTES # BLD AUTO: 1.5 X10(3)/MCL (ref 0.6–4.6)
LYMPHOCYTES NFR BLD AUTO: 20 %
MCH RBC QN AUTO: 28.1 PG (ref 27–31)
MCHC RBC AUTO-ENTMCNC: 34.1 GM/DL (ref 33–36)
MCV RBC AUTO: 82.3 FL (ref 80–94)
MONOCYTES # BLD AUTO: 0.4 X10(3)/MCL (ref 0.1–1.3)
MONOCYTES NFR BLD AUTO: 5 %
NEUTROPHILS # BLD AUTO: 5.36 X10(3)/MCL (ref 2.1–9.2)
NEUTROPHILS NFR BLD AUTO: 72 %
PLATELET # BLD AUTO: 322 X10(3)/MCL (ref 130–400)
PMV BLD AUTO: 12.2 FL (ref 9.4–12.4)
POTASSIUM SERPL-SCNC: 3.9 MMOL/L (ref 3.5–5.1)
PROT SERPL-MCNC: 7.7 GM/DL (ref 5.8–7.6)
RBC # BLD AUTO: 3.56 X10(6)/MCL (ref 4.2–5.4)
SODIUM SERPL-SCNC: 142 MMOL/L (ref 136–145)
T4 FREE SERPL-MCNC: 0.78 NG/DL (ref 0.7–1.48)
TRIGL SERPL-MCNC: 145 MG/DL (ref 37–140)
TSH SERPL-ACNC: 0.2 UIU/ML (ref 0.35–4.94)
VLDLC SERPL CALC-MCNC: 29 MG/DL
WBC # SPEC AUTO: 7.4 X10(3)/MCL (ref 4.5–11.5)

## 2021-03-17 ENCOUNTER — HISTORICAL (OUTPATIENT)
Dept: ADMINISTRATIVE | Facility: HOSPITAL | Age: 66
End: 2021-03-17

## 2021-03-17 LAB
ABS NEUT (OLG): 6.31 X10(3)/MCL (ref 2.1–9.2)
BASOPHILS # BLD AUTO: 0 X10(3)/MCL (ref 0–0.2)
BASOPHILS NFR BLD AUTO: 0 %
EOSINOPHIL # BLD AUTO: 0.4 X10(3)/MCL (ref 0–0.9)
EOSINOPHIL NFR BLD AUTO: 5 %
ERYTHROCYTE [DISTWIDTH] IN BLOOD BY AUTOMATED COUNT: 13.7 % (ref 11.5–17)
HCT VFR BLD AUTO: 32.3 % (ref 37–47)
HGB BLD-MCNC: 10.9 GM/DL (ref 12–16)
IRON SERPL-MCNC: 28 UG/DL (ref 50–170)
LYMPHOCYTES # BLD AUTO: 2.2 X10(3)/MCL (ref 0.6–4.6)
LYMPHOCYTES NFR BLD AUTO: 22 %
MCH RBC QN AUTO: 28.2 PG (ref 27–31)
MCHC RBC AUTO-ENTMCNC: 33.7 GM/DL (ref 33–36)
MCV RBC AUTO: 83.7 FL (ref 80–94)
MONOCYTES # BLD AUTO: 0.7 X10(3)/MCL (ref 0.1–1.3)
MONOCYTES NFR BLD AUTO: 7 %
NEUTROPHILS # BLD AUTO: 6.31 X10(3)/MCL (ref 2.1–9.2)
NEUTROPHILS NFR BLD AUTO: 65 %
PLATELET # BLD AUTO: 361 X10(3)/MCL (ref 130–400)
PMV BLD AUTO: 11.3 FL (ref 9.4–12.4)
RBC # BLD AUTO: 3.86 X10(6)/MCL (ref 4.2–5.4)
WBC # SPEC AUTO: 9.7 X10(3)/MCL (ref 4.5–11.5)

## 2021-04-06 ENCOUNTER — HISTORICAL (OUTPATIENT)
Dept: ADMINISTRATIVE | Facility: HOSPITAL | Age: 66
End: 2021-04-06

## 2021-04-06 LAB
ABS NEUT (OLG): 4.24 X10(3)/MCL (ref 2.1–9.2)
ALBUMIN SERPL-MCNC: 4.4 GM/DL (ref 3.4–4.8)
ALBUMIN/GLOB SERPL: 1.2 RATIO (ref 1.1–2)
ALP SERPL-CCNC: 70 UNIT/L (ref 40–150)
ALT SERPL-CCNC: 13 UNIT/L (ref 0–55)
ANTINUCLEAR ANTIBODY SCREEN (OHS): POSITIVE
AST SERPL-CCNC: 18 UNIT/L (ref 5–34)
BASOPHILS # BLD AUTO: 0 X10(3)/MCL (ref 0–0.2)
BASOPHILS NFR BLD AUTO: 0.5 %
BILIRUB SERPL-MCNC: 0.3 MG/DL
BILIRUBIN DIRECT+TOT PNL SERPL-MCNC: 0.1 MG/DL (ref 0–0.5)
BILIRUBIN DIRECT+TOT PNL SERPL-MCNC: 0.2 MG/DL (ref 0–0.8)
BUN SERPL-MCNC: 17.2 MG/DL (ref 9.8–20.1)
CALCIUM SERPL-MCNC: 9.7 MG/DL (ref 8.4–10.2)
CENTROMERE PROTEIN ANTIBODY (OHS): NEGATIVE
CHLORIDE SERPL-SCNC: 109 MMOL/L (ref 98–107)
CO2 SERPL-SCNC: 22 MMOL/L (ref 23–31)
CREAT SERPL-MCNC: 0.96 MG/DL (ref 0.55–1.02)
DSDNA ANTIBODY (OHS): NEGATIVE
EOSINOPHIL # BLD AUTO: 0.3 X10(3)/MCL (ref 0–0.9)
EOSINOPHIL NFR BLD AUTO: 4.2 %
ERYTHROCYTE [DISTWIDTH] IN BLOOD BY AUTOMATED COUNT: 13.4 % (ref 11.5–17)
FERRITIN SERPL-MCNC: 212.34 NG/ML (ref 4.63–204)
FOLATE SERPL-MCNC: 10.2 NG/ML (ref 7–31.4)
GLOBULIN SER-MCNC: 3.7 GM/DL (ref 2.4–3.5)
GLUCOSE SERPL-MCNC: 96 MG/DL (ref 82–115)
GROUP & RH: NORMAL
HAPTOGLOB SERPL-MCNC: 60 MG/DL (ref 63–273)
HCT VFR BLD AUTO: 31.2 % (ref 37–47)
HGB BLD-MCNC: 10.6 GM/DL (ref 12–16)
IRON SATN MFR SERPL: 22 % (ref 20–50)
IRON SERPL-MCNC: 55 UG/DL (ref 50–170)
JO-1 ANTIBODY (OHS): NEGATIVE
LDH SERPL-CCNC: 251 UNIT/L (ref 140–271)
LYMPHOCYTES # BLD AUTO: 2.4 X10(3)/MCL (ref 0.6–4.6)
LYMPHOCYTES NFR BLD AUTO: 32.5 %
MCH RBC QN AUTO: 27.7 PG (ref 27–31)
MCHC RBC AUTO-ENTMCNC: 34 GM/DL (ref 33–36)
MCV RBC AUTO: 81.7 FL (ref 80–94)
MONOCYTES # BLD AUTO: 0.4 X10(3)/MCL (ref 0.1–1.3)
MONOCYTES NFR BLD AUTO: 5.1 %
NEUTROPHILS # BLD AUTO: 4.2 X10(3)/MCL (ref 2.1–9.2)
NEUTROPHILS NFR BLD AUTO: 57.6 %
PLATELET # BLD AUTO: 266 X10(3)/MCL (ref 130–400)
PMV BLD AUTO: 10.9 FL (ref 9.4–12.4)
POTASSIUM SERPL-SCNC: 4.5 MMOL/L (ref 3.5–5.1)
PROT SERPL-MCNC: 8.1 GM/DL
PROT SERPL-MCNC: 8.1 GM/DL (ref 5.8–7.6)
RBC # BLD AUTO: 3.82 X10(6)/MCL (ref 4.2–5.4)
RET# (OHS): 0.02 X10^6/ML (ref 0.02–0.08)
RETICULOCYTE COUNT AUTOMATED (OLG): 0.6 % (ref 1.1–2.1)
RHEUMATOID FACT SERPL-ACNC: <13 IU/ML
RNP70 ANTIBODY (OHS): NEGATIVE
SCLERODERMA (SCL-70S) ANTIBODY (OHS): NEGATIVE
SMITH DP IGG (OHS): NEGATIVE
SODIUM SERPL-SCNC: 139 MMOL/L (ref 136–145)
SSA(RO) ANTIBODY (OHS): NEGATIVE
SSB(LA) ANTIBODY (OHS): NEGATIVE
TIBC SERPL-MCNC: 194 UG/DL (ref 70–310)
TIBC SERPL-MCNC: 249 UG/DL (ref 250–450)
TRANSFERRIN SERPL-MCNC: 213 MG/DL (ref 173–360)
TSH SERPL-ACNC: 0.35 UIU/ML (ref 0.35–4.94)
U1RNP ANTIBODY (OHS): POSITIVE
VIT B12 SERPL-MCNC: 445 PG/ML (ref 213–816)
WBC # SPEC AUTO: 7.4 X10(3)/MCL (ref 4.5–11.5)

## 2021-07-23 ENCOUNTER — HISTORICAL (OUTPATIENT)
Dept: ADMINISTRATIVE | Facility: HOSPITAL | Age: 66
End: 2021-07-23

## 2021-07-23 LAB
ABS NEUT (OLG): 2.97 X10(3)/MCL (ref 2.1–9.2)
ALBUMIN SERPL-MCNC: 3.8 GM/DL (ref 3.4–4.8)
ALBUMIN/GLOB SERPL: 1 RATIO (ref 1.1–2)
ALP SERPL-CCNC: 57 UNIT/L (ref 40–150)
ALT SERPL-CCNC: 13 UNIT/L (ref 0–55)
APPEARANCE, UA: CLEAR
AST SERPL-CCNC: 15 UNIT/L (ref 5–34)
BACTERIA SPEC CULT: ABNORMAL /HPF
BASOPHILS # BLD AUTO: 0 X10(3)/MCL (ref 0–0.2)
BASOPHILS NFR BLD AUTO: 1 %
BILIRUB SERPL-MCNC: 0.4 MG/DL
BILIRUB UR QL STRIP: NEGATIVE
BILIRUBIN DIRECT+TOT PNL SERPL-MCNC: <0.1 MG/DL (ref 0–0.5)
BILIRUBIN DIRECT+TOT PNL SERPL-MCNC: >0.3 MG/DL (ref 0–0.8)
BUN SERPL-MCNC: 14.9 MG/DL (ref 9.8–20.1)
CALCIUM SERPL-MCNC: 9.1 MG/DL (ref 8.4–10.2)
CHLORIDE SERPL-SCNC: 105 MMOL/L (ref 98–107)
CHOLEST SERPL-MCNC: 253 MG/DL
CHOLEST/HDLC SERPL: 5 {RATIO} (ref 0–5)
CO2 SERPL-SCNC: 26 MMOL/L (ref 23–31)
COLOR UR: YELLOW
CREAT SERPL-MCNC: 1.29 MG/DL (ref 0.55–1.02)
CREAT UR-MCNC: 110.2 MG/DL (ref 45–106)
DEPRECATED CALCIDIOL+CALCIFEROL SERPL-MC: 64.2 NG/ML (ref 30–80)
EOSINOPHIL # BLD AUTO: 0.6 X10(3)/MCL (ref 0–0.9)
EOSINOPHIL NFR BLD AUTO: 8 %
ERYTHROCYTE [DISTWIDTH] IN BLOOD BY AUTOMATED COUNT: 13.8 % (ref 11.5–17)
EST. AVERAGE GLUCOSE BLD GHB EST-MCNC: 114 MG/DL
GLOBULIN SER-MCNC: 3.7 GM/DL (ref 2.4–3.5)
GLUCOSE (UA): NEGATIVE
GLUCOSE SERPL-MCNC: 82 MG/DL (ref 82–115)
HBA1C MFR BLD: 5.6 %
HCT VFR BLD AUTO: 27.5 % (ref 37–47)
HDLC SERPL-MCNC: 51 MG/DL (ref 35–60)
HGB BLD-MCNC: 9.5 GM/DL (ref 12–16)
HGB UR QL STRIP: NEGATIVE
KETONES UR QL STRIP: NEGATIVE
LDLC SERPL CALC-MCNC: 154 MG/DL (ref 50–140)
LEUKOCYTE ESTERASE UR QL STRIP: ABNORMAL
LYMPHOCYTES # BLD AUTO: 3 X10(3)/MCL (ref 0.6–4.6)
LYMPHOCYTES NFR BLD AUTO: 42 %
MCH RBC QN AUTO: 27.5 PG (ref 27–31)
MCHC RBC AUTO-ENTMCNC: 34.5 GM/DL (ref 33–36)
MCV RBC AUTO: 79.5 FL (ref 80–94)
MICROALBUMIN UR-MCNC: 8 UG/ML
MICROALBUMIN/CREAT RATIO PNL UR: 7.3 MG/GM CR (ref 0–30)
MONOCYTES # BLD AUTO: 0.6 X10(3)/MCL (ref 0.1–1.3)
MONOCYTES NFR BLD AUTO: 8 %
NEUTROPHILS # BLD AUTO: 2.97 X10(3)/MCL (ref 2.1–9.2)
NEUTROPHILS NFR BLD AUTO: 41 %
NITRITE UR QL STRIP: NEGATIVE
PH UR STRIP: 6.5 [PH] (ref 5–9)
PLATELET # BLD AUTO: 322 X10(3)/MCL (ref 130–400)
PMV BLD AUTO: 11.6 FL (ref 9.4–12.4)
POTASSIUM SERPL-SCNC: 4.1 MMOL/L (ref 3.5–5.1)
PROT SERPL-MCNC: 7.5 GM/DL (ref 5.8–7.6)
PROT UR QL STRIP: NEGATIVE
RBC # BLD AUTO: 3.46 X10(6)/MCL (ref 4.2–5.4)
RBC #/AREA URNS HPF: ABNORMAL /[HPF]
SODIUM SERPL-SCNC: 140 MMOL/L (ref 136–145)
SP GR UR STRIP: 1.02 (ref 1–1.03)
SQUAMOUS EPITHELIAL, UA: ABNORMAL /HPF (ref 0–4)
TRIGL SERPL-MCNC: 238 MG/DL (ref 37–140)
TSH SERPL-ACNC: 0.63 UIU/ML (ref 0.35–4.94)
UROBILINOGEN UR STRIP-ACNC: 0.2
VLDLC SERPL CALC-MCNC: 48 MG/DL
WBC # SPEC AUTO: 7.2 X10(3)/MCL (ref 4.5–11.5)
WBC #/AREA URNS HPF: 6 /HPF (ref 0–3)

## 2021-08-09 ENCOUNTER — HISTORICAL (OUTPATIENT)
Dept: RADIOLOGY | Facility: HOSPITAL | Age: 66
End: 2021-08-09

## 2021-09-07 LAB
LEFT EYE DM RETINOPATHY: NEGATIVE
RIGHT EYE DM RETINOPATHY: NEGATIVE

## 2021-09-08 ENCOUNTER — HISTORICAL (OUTPATIENT)
Dept: ADMINISTRATIVE | Facility: HOSPITAL | Age: 66
End: 2021-09-08

## 2021-09-08 LAB
ABS NEUT (OLG): 4.46 X10(3)/MCL (ref 2.1–9.2)
BASOPHILS # BLD AUTO: 0 X10(3)/MCL (ref 0–0.2)
BASOPHILS NFR BLD AUTO: 0 %
BUN SERPL-MCNC: 15.4 MG/DL (ref 9.8–20.1)
CALCIUM SERPL-MCNC: 9.5 MG/DL (ref 8.4–10.2)
CHLORIDE SERPL-SCNC: 107 MMOL/L (ref 98–107)
CO2 SERPL-SCNC: 27 MMOL/L (ref 23–31)
CREAT SERPL-MCNC: 1.09 MG/DL (ref 0.55–1.02)
CREAT/UREA NIT SERPL: 14
EOSINOPHIL # BLD AUTO: 0.3 X10(3)/MCL (ref 0–0.9)
EOSINOPHIL NFR BLD AUTO: 4 %
ERYTHROCYTE [DISTWIDTH] IN BLOOD BY AUTOMATED COUNT: 14.4 % (ref 11.5–17)
GLUCOSE SERPL-MCNC: 121 MG/DL (ref 82–115)
HCT VFR BLD AUTO: 29.7 % (ref 37–47)
HGB BLD-MCNC: 10 GM/DL (ref 12–16)
LYMPHOCYTES # BLD AUTO: 2.1 X10(3)/MCL (ref 0.6–4.6)
LYMPHOCYTES NFR BLD AUTO: 29 %
MCH RBC QN AUTO: 27.6 PG (ref 27–31)
MCHC RBC AUTO-ENTMCNC: 33.7 GM/DL (ref 33–36)
MCV RBC AUTO: 82 FL (ref 80–94)
MONOCYTES # BLD AUTO: 0.5 X10(3)/MCL (ref 0.1–1.3)
MONOCYTES NFR BLD AUTO: 6 %
NEUTROPHILS # BLD AUTO: 4.46 X10(3)/MCL (ref 2.1–9.2)
NEUTROPHILS NFR BLD AUTO: 60 %
PLATELET # BLD AUTO: 310 X10(3)/MCL (ref 130–400)
PMV BLD AUTO: 11.6 FL (ref 9.4–12.4)
POTASSIUM SERPL-SCNC: 4.4 MMOL/L (ref 3.5–5.1)
RBC # BLD AUTO: 3.62 X10(6)/MCL (ref 4.2–5.4)
SODIUM SERPL-SCNC: 139 MMOL/L (ref 136–145)
WBC # SPEC AUTO: 7.4 X10(3)/MCL (ref 4.5–11.5)

## 2021-09-09 ENCOUNTER — HISTORICAL (OUTPATIENT)
Dept: RADIOLOGY | Facility: HOSPITAL | Age: 66
End: 2021-09-09

## 2021-09-10 ENCOUNTER — HISTORICAL (OUTPATIENT)
Dept: HEMATOLOGY/ONCOLOGY | Facility: CLINIC | Age: 66
End: 2021-09-10

## 2021-09-10 LAB
ABS NEUT (OLG): 4.73 X10(3)/MCL (ref 2.1–9.2)
ALBUMIN SERPL-MCNC: 4.3 GM/DL (ref 3.4–4.8)
ALBUMIN/GLOB SERPL: 1.2 RATIO (ref 1.1–2)
ALP SERPL-CCNC: 64 UNIT/L (ref 40–150)
ALT SERPL-CCNC: 12 UNIT/L (ref 0–55)
AST SERPL-CCNC: 16 UNIT/L (ref 5–34)
BASOPHILS # BLD AUTO: 0 X10(3)/MCL (ref 0–0.2)
BASOPHILS NFR BLD AUTO: 0.6 %
BILIRUB SERPL-MCNC: 0.4 MG/DL
BILIRUBIN DIRECT+TOT PNL SERPL-MCNC: 0.1 MG/DL (ref 0–0.5)
BILIRUBIN DIRECT+TOT PNL SERPL-MCNC: 0.3 MG/DL (ref 0–0.8)
BUN SERPL-MCNC: 16.5 MG/DL (ref 9.8–20.1)
CALCIUM SERPL-MCNC: 10.1 MG/DL (ref 8.4–10.2)
CHLORIDE SERPL-SCNC: 106 MMOL/L (ref 98–107)
CO2 SERPL-SCNC: 24 MMOL/L (ref 23–31)
CREAT SERPL-MCNC: 1.23 MG/DL (ref 0.55–1.02)
EOSINOPHIL # BLD AUTO: 0.2 X10(3)/MCL (ref 0–0.9)
EOSINOPHIL NFR BLD AUTO: 2.2 %
ERYTHROCYTE [DISTWIDTH] IN BLOOD BY AUTOMATED COUNT: 14.2 % (ref 11.5–17)
GLOBULIN SER-MCNC: 3.7 GM/DL (ref 2.4–3.5)
GLUCOSE SERPL-MCNC: 139 MG/DL (ref 82–115)
HAPTOGLOB SERPL-MCNC: 63 MG/DL (ref 63–273)
HAV IGM SERPL QL IA: NONREACTIVE
HBV CORE IGM SERPL QL IA: NONREACTIVE
HBV SURFACE AG SERPL QL IA: NONREACTIVE
HCT VFR BLD AUTO: 30.4 % (ref 37–47)
HCV AB SERPL QL IA: NONREACTIVE
HEPATITIS PANEL INTERP: NORMAL
HGB BLD-MCNC: 10.3 GM/DL (ref 12–16)
HIV 1+2 AB+HIV1 P24 AG SERPL QL IA: NONREACTIVE
LDH SERPL-CCNC: 210 UNIT/L (ref 140–271)
LYMPHOCYTES # BLD AUTO: 1.6 X10(3)/MCL (ref 0.6–4.6)
LYMPHOCYTES NFR BLD AUTO: 22.7 %
MCH RBC QN AUTO: 28 PG (ref 27–31)
MCHC RBC AUTO-ENTMCNC: 33.9 GM/DL (ref 33–36)
MCV RBC AUTO: 82.6 FL (ref 80–94)
MONOCYTES # BLD AUTO: 0.4 X10(3)/MCL (ref 0.1–1.3)
MONOCYTES NFR BLD AUTO: 5.3 %
NEUTROPHILS # BLD AUTO: 4.7 X10(3)/MCL (ref 2.1–9.2)
NEUTROPHILS NFR BLD AUTO: 69.1 %
PLATELET # BLD AUTO: 257 X10(3)/MCL (ref 130–400)
PMV BLD AUTO: 10.8 FL (ref 9.4–12.4)
POTASSIUM SERPL-SCNC: 4.1 MMOL/L (ref 3.5–5.1)
PROT SERPL-MCNC: 8 GM/DL (ref 5.8–7.6)
RBC # BLD AUTO: 3.68 X10(6)/MCL (ref 4.2–5.4)
SODIUM SERPL-SCNC: 141 MMOL/L (ref 136–145)
WBC # SPEC AUTO: 6.8 X10(3)/MCL (ref 4.5–11.5)

## 2021-09-13 ENCOUNTER — HISTORICAL (OUTPATIENT)
Dept: RADIOLOGY | Facility: HOSPITAL | Age: 66
End: 2021-09-13

## 2021-09-13 LAB — BMD RECOMMENDATION EXT: NORMAL

## 2021-09-14 ENCOUNTER — HISTORICAL (OUTPATIENT)
Dept: ADMINISTRATIVE | Facility: HOSPITAL | Age: 66
End: 2021-09-14

## 2021-09-14 LAB — SARS-COV-2 AG RESP QL IA.RAPID: NEGATIVE

## 2021-10-07 ENCOUNTER — HISTORICAL (OUTPATIENT)
Dept: ADMINISTRATIVE | Facility: HOSPITAL | Age: 66
End: 2021-10-07

## 2021-10-07 LAB
ABS NEUT (OLG): 6.04 X10(3)/MCL (ref 2.1–9.2)
BASOPHILS # BLD AUTO: 0.1 X10(3)/MCL (ref 0–0.2)
BASOPHILS NFR BLD AUTO: 1 %
CRP SERPL HS-MCNC: 0.38 MG/DL
EOSINOPHIL # BLD AUTO: 0.4 X10(3)/MCL (ref 0–0.9)
EOSINOPHIL NFR BLD AUTO: 5 %
ERYTHROCYTE [DISTWIDTH] IN BLOOD BY AUTOMATED COUNT: 14.4 % (ref 11.5–17)
ERYTHROCYTE [SEDIMENTATION RATE] IN BLOOD: 33 MM/HR (ref 0–20)
HCT VFR BLD AUTO: 29.3 % (ref 37–47)
HGB BLD-MCNC: 9.9 GM/DL (ref 12–16)
LYMPHOCYTES # BLD AUTO: 2 X10(3)/MCL (ref 0.6–4.6)
LYMPHOCYTES NFR BLD AUTO: 22 %
MCH RBC QN AUTO: 27 PG (ref 27–31)
MCHC RBC AUTO-ENTMCNC: 33.8 GM/DL (ref 33–36)
MCV RBC AUTO: 80.1 FL (ref 80–94)
MONOCYTES # BLD AUTO: 0.6 X10(3)/MCL (ref 0.1–1.3)
MONOCYTES NFR BLD AUTO: 6 %
NEUTROPHILS # BLD AUTO: 6.04 X10(3)/MCL (ref 2.1–9.2)
NEUTROPHILS NFR BLD AUTO: 66 %
PLATELET # BLD AUTO: 471 X10(3)/MCL (ref 130–400)
PMV BLD AUTO: 12.2 FL (ref 9.4–12.4)
RBC # BLD AUTO: 3.66 X10(6)/MCL (ref 4.2–5.4)
WBC # SPEC AUTO: 9.2 X10(3)/MCL (ref 4.5–11.5)

## 2021-10-12 LAB — FINAL CULTURE: NORMAL

## 2021-10-13 ENCOUNTER — HISTORICAL (OUTPATIENT)
Dept: ADMINISTRATIVE | Facility: HOSPITAL | Age: 66
End: 2021-10-13

## 2021-10-16 LAB — FINAL CULTURE: NORMAL

## 2021-10-25 ENCOUNTER — HISTORICAL (OUTPATIENT)
Dept: ADMINISTRATIVE | Facility: HOSPITAL | Age: 66
End: 2021-10-25

## 2022-02-04 ENCOUNTER — HISTORICAL (OUTPATIENT)
Dept: ADMINISTRATIVE | Facility: HOSPITAL | Age: 67
End: 2022-02-04

## 2022-02-04 LAB
ABS NEUT (OLG): 2.96 (ref 2.1–9.2)
ALBUMIN SERPL-MCNC: 4.1 G/DL (ref 3.4–4.8)
ALBUMIN/GLOB SERPL: 1.1 {RATIO} (ref 1.1–2)
ALP SERPL-CCNC: 83 U/L (ref 40–150)
ALT SERPL-CCNC: 6 U/L (ref 0–55)
AST SERPL-CCNC: 11 U/L (ref 5–34)
BASOPHILS # BLD AUTO: 0 10*3/UL (ref 0–0.2)
BASOPHILS NFR BLD AUTO: 1 %
BILIRUB SERPL-MCNC: 0.6 MG/DL
BILIRUBIN DIRECT+TOT PNL SERPL-MCNC: 0.2 (ref 0–0.5)
BILIRUBIN DIRECT+TOT PNL SERPL-MCNC: 0.4 (ref 0–0.8)
BUN SERPL-MCNC: 16.3 MG/DL (ref 9.8–20.1)
CALCIUM SERPL-MCNC: 10 MG/DL (ref 8.7–10.5)
CHLORIDE SERPL-SCNC: 106 MMOL/L (ref 98–107)
CHOLEST SERPL-MCNC: 294 MG/DL
CHOLEST/HDLC SERPL: 4 {RATIO} (ref 0–5)
CK SERPL-CCNC: 59 U/L (ref 29–168)
CO2 SERPL-SCNC: 27 MMOL/L (ref 23–31)
CREAT SERPL-MCNC: 0.97 MG/DL (ref 0.55–1.02)
CREAT UR-MCNC: 175 MG/DL (ref 45–106)
EOSINOPHIL # BLD AUTO: 0.2 10*3/UL (ref 0–0.9)
EOSINOPHIL NFR BLD AUTO: 4 %
ERYTHROCYTE [DISTWIDTH] IN BLOOD BY AUTOMATED COUNT: 14.7 % (ref 11.5–17)
EST. AVERAGE GLUCOSE BLD GHB EST-MCNC: 119.8 MG/DL
GLOBULIN SER-MCNC: 3.6 G/DL (ref 2.4–3.5)
GLUCOSE SERPL-MCNC: 103 MG/DL (ref 82–115)
HBA1C MFR BLD: 5.8 %
HCT VFR BLD AUTO: 32.3 % (ref 37–47)
HDLC SERPL-MCNC: 69 MG/DL (ref 35–60)
HEMOLYSIS INTERF INDEX SERPL-ACNC: 5
HGB BLD-MCNC: 11.4 G/DL (ref 12–16)
ICTERIC INTERF INDEX SERPL-ACNC: 1
IRON SATN MFR SERPL: 31 % (ref 20–50)
IRON SERPL-MCNC: 77 UG/DL (ref 50–170)
LDLC SERPL CALC-MCNC: 199 MG/DL (ref 50–140)
LIPEMIC INTERF INDEX SERPL-ACNC: 1
LYMPHOCYTES # BLD AUTO: 1.6 10*3/UL (ref 0.6–4.6)
LYMPHOCYTES NFR BLD AUTO: 31 %
MANUAL DIFF? (OHS): NO
MCH RBC QN AUTO: 26.3 PG (ref 27–31)
MCHC RBC AUTO-ENTMCNC: 35.3 G/DL (ref 33–36)
MCV RBC AUTO: 74.6 FL (ref 80–94)
MICROALBUMIN UR-MCNC: 9.2
MICROALBUMIN/CREAT RATIO PNL UR: 5.3 (ref 0–30)
MONOCYTES # BLD AUTO: 0.4 10*3/UL (ref 0.1–1.3)
MONOCYTES NFR BLD AUTO: 8 %
NEUTROPHILS # BLD AUTO: 2.96 10*3/UL (ref 2.1–9.2)
NEUTROPHILS NFR BLD AUTO: 56 %
PLATELET # BLD AUTO: 370 10*3/UL (ref 130–400)
PMV BLD AUTO: 11 FL (ref 7.4–10.4)
POS ERR1 (OHS): NORMAL
POTASSIUM SERPL-SCNC: 3.6 MMOL/L (ref 3.5–5.1)
PROT SERPL-MCNC: 7.7 G/DL (ref 5.8–7.6)
RBC # BLD AUTO: 4.33 10*6/UL (ref 4.2–5.4)
SCAN RECIEVED (OHS): YES
SODIUM SERPL-SCNC: 142 MMOL/L (ref 136–145)
T4 FREE SERPL-MCNC: 0.79 NG/DL (ref 0.7–1.48)
TIBC SERPL-MCNC: 173 UG/DL (ref 70–310)
TIBC SERPL-MCNC: 250 UG/DL (ref 250–450)
TRANSFERRIN SERPL-MCNC: 216 MG/DL (ref 173–360)
TRIGL SERPL-MCNC: 131 MG/DL (ref 37–140)
TSH SERPL-ACNC: 0.16 M[IU]/L (ref 0.35–4.94)
VLDLC SERPL CALC-MCNC: 26 MG/DL
WBC # SPEC AUTO: 5.3 10*3/UL (ref 4.5–11.5)

## 2022-04-10 ENCOUNTER — HISTORICAL (OUTPATIENT)
Dept: ADMINISTRATIVE | Facility: HOSPITAL | Age: 67
End: 2022-04-10
Payer: MEDICARE

## 2022-04-26 VITALS
BODY MASS INDEX: 34.85 KG/M2 | SYSTOLIC BLOOD PRESSURE: 138 MMHG | WEIGHT: 177.5 LBS | OXYGEN SATURATION: 98 % | DIASTOLIC BLOOD PRESSURE: 83 MMHG | HEIGHT: 60 IN

## 2022-05-09 RX ORDER — ICOSAPENT ETHYL 1000 MG/1
CAPSULE ORAL
Qty: 60 CAPSULE | Refills: 11 | Status: SHIPPED | OUTPATIENT
Start: 2022-05-09 | End: 2022-05-10 | Stop reason: SDUPTHER

## 2022-05-10 RX ORDER — ICOSAPENT ETHYL 1000 MG/1
2 CAPSULE ORAL 2 TIMES DAILY
Qty: 120 CAPSULE | Refills: 11 | Status: SHIPPED | OUTPATIENT
Start: 2022-05-10 | End: 2023-06-01

## 2022-05-10 NOTE — TELEPHONE ENCOUNTER
----- Message from Lilian Wang sent at 5/10/2022  1:47 PM CDT -----  Regarding: meds  Type:  Needs Medical Advice    Who Called: Pt's daugter in law  Symptoms (please be specific): N/A  How long has patient had these symptoms:  N/A  Pharmacy [facility] and phone #: [facility] [place]  Would the patient rather a call back or a response via [facility]? Call back  Best Call Back Number: [phone]  Additional Information: Only sent a quantity of 60 capsules of Vascepa 1gm , but pt was ordered to take it BID, Pt's daughter in law thought it should've been 120 capsules.. Pt also needs orders for diabetic shoes sent to same pharmacy.

## 2022-06-03 ENCOUNTER — TELEPHONE (OUTPATIENT)
Dept: FAMILY MEDICINE | Facility: CLINIC | Age: 67
End: 2022-06-03
Payer: MEDICARE

## 2022-06-03 NOTE — TELEPHONE ENCOUNTER
----- Message from PadminiCarolina Jacky sent at 6/3/2022  9:22 AM CDT -----  Regarding: Medical Advice  Type:  Needs Medical Advice    Who Called: pt  Symptoms (please be specific):  back surgery problems   How long has patient had these symptoms:   na  Pharmacy name and phone #:  na  Would the patient rather a call back or a response via [facility]? Call back   Best Call Back Number:  [phone]  Additional Information: pt stated ever since she had her back surgery, she's been losing too much weight and not sure what the problem is .. wants to know if there's a cancellation appointment today if she can be next on the list . Advised her the office closes at 12 today

## 2022-08-25 ENCOUNTER — HOSPITAL ENCOUNTER (OUTPATIENT)
Dept: RADIOLOGY | Facility: HOSPITAL | Age: 67
Discharge: HOME | End: 2022-08-25
Attending: OTOLARYNGOLOGY
Payer: MEDICARE

## 2022-08-25 DIAGNOSIS — E04.1 NONTOXIC UNINODULAR GOITER: ICD-10-CM

## 2022-08-25 PROCEDURE — 76536 US EXAM OF HEAD AND NECK: CPT | Mod: TC

## 2022-09-08 DIAGNOSIS — E04.1 THYROID NODULE: Primary | ICD-10-CM

## 2022-09-28 ENCOUNTER — OFFICE VISIT (OUTPATIENT)
Dept: FAMILY MEDICINE | Facility: CLINIC | Age: 67
End: 2022-09-28
Payer: MEDICARE

## 2022-09-28 VITALS
BODY MASS INDEX: 27.29 KG/M2 | WEIGHT: 139 LBS | HEART RATE: 60 BPM | RESPIRATION RATE: 16 BRPM | DIASTOLIC BLOOD PRESSURE: 68 MMHG | SYSTOLIC BLOOD PRESSURE: 117 MMHG | HEIGHT: 60 IN | OXYGEN SATURATION: 98 % | TEMPERATURE: 98 F

## 2022-09-28 DIAGNOSIS — Z12.31 VISIT FOR SCREENING MAMMOGRAM: ICD-10-CM

## 2022-09-28 DIAGNOSIS — R63.0 DECREASED APPETITE: ICD-10-CM

## 2022-09-28 DIAGNOSIS — R63.4 UNINTENTIONAL WEIGHT LOSS: Primary | ICD-10-CM

## 2022-09-28 DIAGNOSIS — D57.3 SICKLE-CELL TRAIT: ICD-10-CM

## 2022-09-28 DIAGNOSIS — I15.2 HYPERTENSION ASSOCIATED WITH DIABETES: ICD-10-CM

## 2022-09-28 DIAGNOSIS — E11.9 CONTROLLED TYPE 2 DIABETES MELLITUS WITHOUT COMPLICATION, WITHOUT LONG-TERM CURRENT USE OF INSULIN: ICD-10-CM

## 2022-09-28 DIAGNOSIS — E11.59 HYPERTENSION ASSOCIATED WITH DIABETES: ICD-10-CM

## 2022-09-28 DIAGNOSIS — I73.9 PERIPHERAL VASCULAR DISEASE, UNSPECIFIED: ICD-10-CM

## 2022-09-28 PROCEDURE — 99214 PR OFFICE/OUTPT VISIT, EST, LEVL IV, 30-39 MIN: ICD-10-PCS | Mod: ,,, | Performed by: FAMILY MEDICINE

## 2022-09-28 PROCEDURE — 99214 OFFICE O/P EST MOD 30 MIN: CPT | Mod: ,,, | Performed by: FAMILY MEDICINE

## 2022-09-28 NOTE — PROGRESS NOTES
Patient ID: [id number]     Chief Complaint: Weight Loss        HPI:     Cheryl Solis is a 67 y.o. female here today for a follow up. Patient reports decreased appetite and 30-40 pounds in a year, unintentionally, started after she had back surgery. She denies fever, chills, night sweats, dysphagia, abdominal pain, vomiting, diarrhea, bloody stools, or urinary symptoms. She needs MMG ordered. She is UTD on Colonoscopy with GI.   - She has DM II, stable with medications, needs Podiatry referral for DM II foot care, has been craving sugar, denies any other DM II symptoms, had NL HgA1C and urine microalbumin in 2022, due for repeat HgA1C today.    - HTN is controlled with Rx, asymptomatic, no side effects.   - Sickle cell trait is stable and asymptomatic.  - PVD is stable with Rx, no side effects, asymptomatic, followed by Dr. Suh (Vascular).   - Patient is without any other complaints today.     Advance Care Planning     Date: 2022  Patient did not wish or was not able to name a surrogate decision maker or provide an Advance Care Plan, she will check with her family and get back with our office.       ----------------------------  Anemia, unspecified  Fibromyalgia  HTN (hypertension)  Mixed hyperlipidemia  Obesity, unspecified  Sickle-cell disease without crisis  Sleep apnea, unspecified  Type 2 diabetes mellitus without complications  Vitamin D deficiency     Past Surgical History:   Procedure Laterality Date     SECTION       SECTION       SECTION      COLONOSCOPY      lasar eye          Review of patient's allergies indicates:   Allergen Reactions    Metformin Rash     Rash (skin)^  Rash (skin)^      Morphine Itching     Itchy (skin)^  Itchy (skin)^      Shellfish containing products     Ibuprofen Rash     Hives (skin)^  Hives (skin)^         Outpatient Medications Marked as Taking for the 22 encounter (Office Visit) with Monica Max MD   Medication Sig Dispense  Refill    albuterol (PROVENTIL) 2.5 mg /3 mL (0.083 %) nebulizer solution use one vial in nebulizer every 4 to 6 hours as needed for wheezing 75 mL 11    amlodipine-benazepril 5-10 mg (LOTREL) 5-10 mg per capsule TAKE ONE CAPSULE BY MOUTH DAILY 90 capsule 3    EPINEPHrine (EPIPEN) 0.3 mg/0.3 mL AtIn Inject 1 each into the muscle daily as needed.      ezetimibe-simvastatin 10-40 mg (VYTORIN) 10-40 mg per tablet Take 1 tablet by mouth once daily.      ferrous gluconate (FERGON) 324 MG tablet TAKE ONE TABLET BY MOUTH THREE TIMES DAILY WITH FOOD 90 tablet 11    fluticasone propionate (FLONASE) 50 mcg/actuation nasal spray 2 sprays by Each Nostril route once daily.      hydroCHLOROthiazide (HYDRODIURIL) 25 MG tablet TAKE ONE TABLET BY MOUTH TWICE DAILY 180 tablet 3    HYDROcodone-acetaminophen (NORCO)  mg per tablet Take 1 tablet by mouth every 4 (four) hours.      hydrOXYchloroQUINE (PLAQUENIL) 200 mg tablet Take 1 tablet by mouth once daily.      hydrOXYzine HCL (ATARAX) 25 MG tablet Take 25 mg by mouth every 8 (eight) hours as needed.      icosapent ethyL (VASCEPA) 1 gram Cap Take 2 capsules (2,000 mg total) by mouth 2 (two) times daily. 120 capsule 11    meloxicam (MOBIC) 15 MG tablet Take 1 tablet by mouth once daily.      montelukast (SINGULAIR) 10 mg tablet Take 10 mg by mouth once daily.      omalizumab (XOLAIR) 150 mg/mL injection Inject 300 mg into the skin every 30 days.      omeprazole (PRILOSEC) 40 MG capsule Take 1 capsule by mouth once daily.      pregabalin (LYRICA) 225 MG Cap Take 225 mg by mouth 2 (two) times daily.      tiZANidine (ZANAFLEX) 4 MG tablet Take 8 mg by mouth nightly as needed.      traZODone (DESYREL) 50 MG tablet Take 50 mg by mouth nightly.      VENTOLIN HFA 90 mcg/actuation inhaler use 2 puffs by mouth every 6 hours as needed for wheezing 18 g 11       Social History     Socioeconomic History    Marital status:    Tobacco Use    Smoking status: Never    Smokeless tobacco:  Never   Substance and Sexual Activity    Alcohol use: Not Currently    Drug use: Never    Sexual activity: Yes        Family History   Problem Relation Age of Onset    Cancer Mother     Stroke Sister     Hypertension Sister     Drug abuse Brother     Alcohol abuse Brother         Subjective:       Review of Systems:    See HPI for details    Constitutional: As per HPI. Denies Chills. Denies Fever. Denies Night sweats.  Eye: Denies Blurred vision. Denies Discharge. Denies Eye pain.  ENT: Denies Decreased hearing. Denies Sore throat. Denies Swollen glands.  Respiratory: Denies Cough. Denies Shortness of breath. Denies Shortness of breath with exertion. Denies Wheezing.  Cardiovascular: Denies Chest pain at rest. Denies Chest pain with exertion. Denies Irregular heartbeat. Denies Palpitations.  Gastrointestinal: Denies Abdominal pain. Denies Diarrhea. Denies Nausea. Denies Vomiting. Denies Hematemesis or Hematochezia.  Genitourinary: Denies Dysuria. Denies Urinary frequency. Denies Urinary urgency. Denies Blood in urine.  Endocrine: Denies Cold intolerance. Denies Excessive thirst. Denies Heat intolerance. Denies Weight loss. Denies Weight gain.  Musculoskeletal: Denies Painful joints. Denies Weakness.  Integumentary: Denies Rash. Denies Itching. Denies Dry skin.  Neurologic: Denies Dizziness. Denies Fainting. Denies Headache.  Psychiatric: Denies Depression. Denies Anxiety. Denies Suicidal/Homicidal ideations.    All Other ROS: Negative except as stated in HPI.       Objective:     /68 (BP Location: Left arm, Patient Position: Sitting, BP Method: Medium (Automatic))   Pulse 60   Temp 98.2 °F (36.8 °C) (Oral)   Resp 16   Ht 5' (1.524 m)   Wt 63 kg (139 lb)   SpO2 98%   BMI 27.15 kg/m²     Physical Exam    General: Alert and oriented, No acute distress. Overweight.  Head: Normocephalic, Atraumatic.  Eye: Pupils are equal, round and reactive to light, Extraocular movements are intact, Sclera  non-icteric.  Ears/Nose/Throat: Normal, Mucosa moist,Clear.  Neck/Thyroid: Supple, Non-tender, No carotid bruit, No palpable thyromegaly or thyroid nodule, No lymphadenopathy, No JVD, Full range of motion.  Respiratory: Clear to auscultation bilaterally; No wheezes, rales or rhonchi,Non-labored respirations, Symmetrical chest wall expansion.  Cardiovascular: Regular rate and rhythm, S1/S2 normal, No murmurs, rubs or gallops.  Gastrointestinal: Soft, Non-tender, Non-distended, Normal bowel sounds, No palpable organomegaly.  Musculoskeletal: Normal range of motion.  Integumentary: Warm, Dry, Intact, No suspicious lesions or rashes.  Extremities: No clubbing, cyanosis or edema  Neurologic: No focal deficits, Cranial Nerves II-XII are grossly intact, Motor strength normal upper and lower extremities, Sensory exam intact.  Psychiatric: Normal interaction, Coherent speech, Euthymic mood, Appropriate affect         Assessment:       ICD-10-CM ICD-9-CM   1. Unintentional weight loss  R63.4 783.21   2. Decreased appetite  R63.0 783.0   3. Visit for screening mammogram  Z12.31 V76.12   4. Controlled type 2 diabetes mellitus without complication, without long-term current use of insulin  E11.9 250.00   5. Sickle-cell trait  D57.3 282.5   6. Peripheral vascular disease, unspecified  I73.9 443.9   7. Hypertension associated with diabetes  E11.59 250.80    I15.2 401.9        Plan:     Problem List Items Addressed This Visit          Cardiac/Vascular    Peripheral vascular disease, unspecified       Oncology    Sickle-cell trait    Relevant Orders    CBC Auto Differential     Other Visit Diagnoses       Unintentional weight loss    -  Primary    Relevant Orders    CBC Auto Differential    Comprehensive Metabolic Panel    Hemoglobin A1C    Lipid Panel    Urinalysis, Reflex to Urine Culture Urine, Clean Catch    TSH    CT Head Without Contrast    CT Chest Without Contrast    CT Abdomen Pelvis W Wo Contrast    Decreased appetite         Relevant Orders    CBC Auto Differential    Comprehensive Metabolic Panel    Hemoglobin A1C    Lipid Panel    Urinalysis, Reflex to Urine Culture Urine, Clean Catch    TSH    CT Head Without Contrast    CT Chest Without Contrast    CT Abdomen Pelvis W Wo Contrast    Visit for screening mammogram        Relevant Orders    Mammo Digital Screening Bilat w/ Mehdi    Controlled type 2 diabetes mellitus without complication, without long-term current use of insulin        Relevant Orders    Comprehensive Metabolic Panel    Hemoglobin A1C    Lipid Panel    Ambulatory referral/consult to Podiatry    Hypertension associated with diabetes             1. Unintentional weight loss  - CBC Auto Differential; Future  - Comprehensive Metabolic Panel; Future  - Hemoglobin A1C; Future  - Lipid Panel; Future  - Urinalysis, Reflex to Urine Culture Urine, Clean Catch; Future  - TSH; Future  - CT Head Without Contrast; Future  - CT Chest Without Contrast; Future  - CT Abdomen Pelvis W Wo Contrast; Future  - Will treat pending results. Glucerna 1 can po x tid with meals. If workup is negative, will proceed with GI referral. Notify M.D. or ER if symptoms persist or worsen, temp >100.4, or any acute illness.      2. Decreased appetite  - CBC Auto Differential; Future  - Comprehensive Metabolic Panel; Future  - Hemoglobin A1C; Future  - Lipid Panel; Future  - Urinalysis, Reflex to Urine Culture Urine, Clean Catch; Future  - TSH; Future  - CT Head Without Contrast; Future  - CT Chest Without Contrast; Future  - CT Abdomen Pelvis W Wo Contrast; Future  - Same as #1    3. Visit for screening mammogram  - Mammo Digital Screening Bilat w/ Mehdi; Future    4. Controlled type 2 diabetes mellitus without complication, without long-term current use of insulin  - Comprehensive Metabolic Panel; Future  - Hemoglobin A1C; Future  - Lipid Panel; Future  - Ambulatory referral/consult to Podiatry; Future  - Will treat pending results.  Keep daily fasting  CBG log. Keep appointment for annual eye exam as scheduled. Notify M.D. or ER if CBG >400 or <60, polyuria, polydipsia, or polyphagia.   Lab Results   Component Value Date    HGBA1C 5.8 02/04/2022      Continue   On ACE and Statin according to guidelines.  Follow ADA Diet. Avoid soda, simple sweets, and limit rice/pasta/breads/starches.  Maintain healthy weight with goal BMI <30.  Exercise 5 times per week for 30 minutes per day.  Stressed importance of daily foot exams.  Stressed importance of annual dilated eye exam.     5. Sickle-cell trait, Asymptomatic  - CBC Auto Differential; Future    6. Peripheral vascular disease, unspecified  - Continue followup with Vascular as scheduled.    7. Hypertension associated with diabetes  - BP is well controlled. Continue BP Rx as prescribed. Keep daily BP log. Notify M.D. or ER if BP >170/100 or <90/60, chest pain, palpitations, headache, SOB, temp greater than 100.4, or any acute illness.   Continue  Low Sodium Diet (DASH Diet - Less than 2 grams of sodium per day).  Monitor blood pressure daily and log. Report consistent numbers greater than 140/90.  Smoking cessation encouraged to aid in BP reduction.  Maintain healthy weight with goal BMI <30. Exercise 30 minutes per day, 5 days per week.      Cheryl was seen today for weight loss.    Diagnoses and all orders for this visit:    Unintentional weight loss  -     CBC Auto Differential; Future  -     Comprehensive Metabolic Panel; Future  -     Hemoglobin A1C; Future  -     Lipid Panel; Future  -     Urinalysis, Reflex to Urine Culture Urine, Clean Catch; Future  -     TSH; Future  -     CT Head Without Contrast; Future  -     CT Chest Without Contrast; Future  -     CT Abdomen Pelvis W Wo Contrast; Future    Decreased appetite  -     CBC Auto Differential; Future  -     Comprehensive Metabolic Panel; Future  -     Hemoglobin A1C; Future  -     Lipid Panel; Future  -     Urinalysis, Reflex to Urine Culture Urine, Clean Catch;  Future  -     TSH; Future  -     CT Head Without Contrast; Future  -     CT Chest Without Contrast; Future  -     CT Abdomen Pelvis W Wo Contrast; Future    Visit for screening mammogram  -     Mammo Digital Screening Bilat w/ Mehdi; Future    Controlled type 2 diabetes mellitus without complication, without long-term current use of insulin  -     Comprehensive Metabolic Panel; Future  -     Hemoglobin A1C; Future  -     Lipid Panel; Future  -     Ambulatory referral/consult to Podiatry; Future    Sickle-cell trait  -     CBC Auto Differential; Future    Peripheral vascular disease, unspecified    Hypertension associated with diabetes        Medication List with Changes/Refills   Current Medications    ALBUTEROL (PROVENTIL) 2.5 MG /3 ML (0.083 %) NEBULIZER SOLUTION    use one vial in nebulizer every 4 to 6 hours as needed for wheezing       Start Date: 5/24/2022 End Date: --    AMLODIPINE-BENAZEPRIL 5-10 MG (LOTREL) 5-10 MG PER CAPSULE    TAKE ONE CAPSULE BY MOUTH DAILY       Start Date: 5/26/2022 End Date: --    EPINEPHRINE (EPIPEN) 0.3 MG/0.3 ML ATIN    Inject 1 each into the muscle daily as needed.       Start Date: 4/25/2022 End Date: --    EZETIMIBE-SIMVASTATIN 10-40 MG (VYTORIN) 10-40 MG PER TABLET    Take 1 tablet by mouth once daily.       Start Date: 7/30/2021 End Date: --    FERROUS GLUCONATE (FERGON) 324 MG TABLET    TAKE ONE TABLET BY MOUTH THREE TIMES DAILY WITH FOOD       Start Date: 6/6/2022  End Date: --    FLUTICASONE PROPIONATE (FLONASE) 50 MCG/ACTUATION NASAL SPRAY    2 sprays by Each Nostril route once daily.       Start Date: 6/3/2022  End Date: --    HYDROCHLOROTHIAZIDE (HYDRODIURIL) 25 MG TABLET    TAKE ONE TABLET BY MOUTH TWICE DAILY       Start Date: 6/13/2022 End Date: --    HYDROCODONE-ACETAMINOPHEN (NORCO)  MG PER TABLET    Take 1 tablet by mouth every 4 (four) hours.       Start Date: 6/3/2022  End Date: --    HYDROXYCHLOROQUINE (PLAQUENIL) 200 MG TABLET    Take 1 tablet by mouth  once daily.       Start Date: 11/29/2021End Date: --    HYDROXYZINE HCL (ATARAX) 25 MG TABLET    Take 25 mg by mouth every 8 (eight) hours as needed.       Start Date: 5/19/2022 End Date: --    ICOSAPENT ETHYL (VASCEPA) 1 GRAM CAP    Take 2 capsules (2,000 mg total) by mouth 2 (two) times daily.       Start Date: 5/10/2022 End Date: --    MELOXICAM (MOBIC) 15 MG TABLET    Take 1 tablet by mouth once daily.       Start Date: 11/29/2021End Date: --    MONTELUKAST (SINGULAIR) 10 MG TABLET    Take 10 mg by mouth once daily.       Start Date: 6/22/2021 End Date: --    OMALIZUMAB (XOLAIR) 150 MG/ML INJECTION    Inject 300 mg into the skin every 30 days.       Start Date: 9/14/2021 End Date: --    OMEPRAZOLE (PRILOSEC) 40 MG CAPSULE    Take 1 capsule by mouth once daily.       Start Date: 11/29/2021End Date: --    PREGABALIN (LYRICA) 225 MG CAP    Take 225 mg by mouth 2 (two) times daily.       Start Date: 6/3/2022  End Date: --    TIZANIDINE (ZANAFLEX) 4 MG TABLET    Take 8 mg by mouth nightly as needed.       Start Date: 6/3/2022  End Date: --    TRAZODONE (DESYREL) 50 MG TABLET    Take 50 mg by mouth nightly.       Start Date: 6/3/2022  End Date: --    VENTOLIN HFA 90 MCG/ACTUATION INHALER    use 2 puffs by mouth every 6 hours as needed for wheezing       Start Date: 5/24/2022 End Date: --          Follow up in about 3 months (around 12/28/2022) for Weight check.

## 2022-10-06 ENCOUNTER — OFFICE VISIT (OUTPATIENT)
Dept: RHEUMATOLOGY | Facility: CLINIC | Age: 67
End: 2022-10-06
Payer: MEDICARE

## 2022-10-06 VITALS
HEIGHT: 60 IN | OXYGEN SATURATION: 99 % | TEMPERATURE: 98 F | SYSTOLIC BLOOD PRESSURE: 114 MMHG | HEART RATE: 62 BPM | WEIGHT: 140.63 LBS | BODY MASS INDEX: 27.61 KG/M2 | RESPIRATION RATE: 20 BRPM | DIASTOLIC BLOOD PRESSURE: 73 MMHG

## 2022-10-06 DIAGNOSIS — F51.01 PRIMARY INSOMNIA: ICD-10-CM

## 2022-10-06 DIAGNOSIS — Z98.890 S/P DISCECTOMY FOR HERNIATED NUCLEUS PULPOSUS: ICD-10-CM

## 2022-10-06 DIAGNOSIS — Z87.39 S/P DISCECTOMY FOR HERNIATED NUCLEUS PULPOSUS: ICD-10-CM

## 2022-10-06 DIAGNOSIS — M35.1 MCTD (MIXED CONNECTIVE TISSUE DISEASE): Primary | ICD-10-CM

## 2022-10-06 DIAGNOSIS — M79.7 FIBROMYALGIA SYNDROME: ICD-10-CM

## 2022-10-06 PROCEDURE — 99999 PR PBB SHADOW E&M-EST. PATIENT-LVL IV: CPT | Mod: PBBFAC,,, | Performed by: INTERNAL MEDICINE

## 2022-10-06 PROCEDURE — 99214 OFFICE O/P EST MOD 30 MIN: CPT | Mod: S$PBB,,, | Performed by: INTERNAL MEDICINE

## 2022-10-06 PROCEDURE — 99214 PR OFFICE/OUTPT VISIT, EST, LEVL IV, 30-39 MIN: ICD-10-PCS | Mod: S$PBB,,, | Performed by: INTERNAL MEDICINE

## 2022-10-06 PROCEDURE — 99999 PR PBB SHADOW E&M-EST. PATIENT-LVL IV: ICD-10-PCS | Mod: PBBFAC,,, | Performed by: INTERNAL MEDICINE

## 2022-10-06 PROCEDURE — 99214 OFFICE O/P EST MOD 30 MIN: CPT | Mod: PBBFAC | Performed by: INTERNAL MEDICINE

## 2022-10-06 RX ORDER — HYDROXYCHLOROQUINE SULFATE 200 MG/1
200 TABLET, FILM COATED ORAL 2 TIMES DAILY
Qty: 60 TABLET | Refills: 5 | Status: SHIPPED | OUTPATIENT
Start: 2022-10-06 | End: 2023-02-27 | Stop reason: SDUPTHER

## 2022-10-06 RX ORDER — DICLOFENAC SODIUM 50 MG/1
50 TABLET, DELAYED RELEASE ORAL 2 TIMES DAILY PRN
Qty: 60 TABLET | Refills: 5 | Status: SHIPPED | OUTPATIENT
Start: 2022-10-06 | End: 2023-02-27

## 2022-10-06 RX ORDER — PREGABALIN 225 MG/1
225 CAPSULE ORAL 2 TIMES DAILY
Qty: 60 CAPSULE | Refills: 5 | Status: SHIPPED | OUTPATIENT
Start: 2022-10-06 | End: 2023-02-27

## 2022-10-06 RX ORDER — CETIRIZINE HYDROCHLORIDE 10 MG/1
10 TABLET ORAL NIGHTLY
COMMUNITY
Start: 2022-10-04

## 2022-10-06 RX ORDER — OMEPRAZOLE 40 MG/1
40 CAPSULE, DELAYED RELEASE ORAL DAILY
Qty: 30 CAPSULE | Refills: 5 | Status: SHIPPED | OUTPATIENT
Start: 2022-10-06 | End: 2023-02-27 | Stop reason: SDUPTHER

## 2022-10-06 RX ORDER — TIZANIDINE 4 MG/1
8 TABLET ORAL NIGHTLY
Qty: 60 TABLET | Refills: 5 | Status: SHIPPED | OUTPATIENT
Start: 2022-10-06 | End: 2023-02-27 | Stop reason: SDUPTHER

## 2022-10-06 RX ORDER — ASPIRIN 81 MG/1
81 TABLET ORAL
Qty: 30 TABLET | Refills: 5 | Status: SHIPPED | OUTPATIENT
Start: 2022-10-06 | End: 2023-05-25

## 2022-10-06 NOTE — PROGRESS NOTES
Subjective:       Patient ID: Cheryl Solis is a 67 y.o. female.    Chief Complaint: Follow-up    The patient is complaining of joint pain involving the MCP PIP wrist elbow shoulders hips knees and ankles bilaterally.  The pain is 4/10 in intensity dull in quality and continuous.  That is associated with a morning stiffness lasting for more than 60 minutes.  Is also having difficulty maintaining a good night of sleep.  This has been associated with myalgias.  Muscle aches are 9/10 in intensity dull in quality and continuous.  They are associated with fatigue.  No fever no chills no others.      Review of Systems   Constitutional:  Negative for appetite change, chills and fever.   HENT:  Negative for congestion, ear pain, mouth sores, nosebleeds and trouble swallowing.    Eyes:  Negative for photophobia and discharge.   Respiratory:  Negative for chest tightness and shortness of breath.    Cardiovascular:  Negative for chest pain.   Gastrointestinal:  Negative for abdominal pain and vomiting.   Endocrine: Negative.    Genitourinary:  Negative for hematuria.   Musculoskeletal:         As per HPI   Skin:  Negative for rash.   Neurological:  Negative for weakness.       Objective:   /73 (BP Location: Left arm, Patient Position: Sitting, BP Method: Medium (Automatic))   Pulse 62   Temp 97.5 °F (36.4 °C) (Oral)   Resp 20   Ht 5' (1.524 m)   Wt 63.8 kg (140 lb 9.6 oz)   SpO2 99%   BMI 27.46 kg/m²      Physical Exam   Constitutional: She is oriented to person, place, and time. She appears well-developed and well-nourished. No distress.   HENT:   Head: Normocephalic and atraumatic.   Right Ear: External ear normal.   Left Ear: External ear normal.   Eyes: Pupils are equal, round, and reactive to light.   Cardiovascular: Normal rate, regular rhythm and normal heart sounds.   Pulmonary/Chest: Breath sounds normal.   Abdominal: Soft. There is no abdominal tenderness.   Musculoskeletal:      Right shoulder: Tenderness  present.      Left shoulder: Tenderness present.      Right elbow: Tenderness present.      Left elbow: Tenderness present.      Right wrist: Tenderness present.      Left wrist: Tenderness present.      Cervical back: Neck supple.      Right hip: Tenderness present.      Left hip: Tenderness present.      Right knee: Tenderness present.      Left knee: Tenderness present.      Right ankle: Tenderness present.      Left ankle: Tenderness present.   Lymphadenopathy:     She has no cervical adenopathy.   Neurological: She is alert and oriented to person, place, and time. She displays normal reflexes. No cranial nerve deficit or sensory deficit. She exhibits normal muscle tone. Coordination normal.   Skin: No rash noted. No erythema.   Vitals reviewed.      Right Side Rheumatological Exam     The patient is tender to palpation of the shoulder, elbow, wrist, knee, 1st PIP, 1st MCP, 2nd PIP, 2nd MCP, 3rd PIP, 3rd MCP, 4th PIP, 4th MCP, 5th PIP, hip, ankle, 1st MTP, 2nd MTP, 3rd MTP, 4th MTP, 5th MTP, 1st toe IP, 2nd toe IP, 3rd toe IP, 4th toe IP and 5th toe IP    Left Side Rheumatological Exam     The patient is tender to palpation of the shoulder, elbow, wrist, knee, 1st PIP, 1st MCP, 2nd PIP, 2nd MCP, 3rd PIP, 3rd MCP, 4th PIP, 4th MCP, 5th PIP, 5th MCP, hip, ankle, 1st MTP, 2nd MTP, 3rd MTP, 4th MTP, 5th MTP, 1st toe IP, 2nd toe IP, 3rd toe IP, 4th toe IP and 5th toe IP.       Completed Fibromyalgia exam 18/18 tender points.  No data to display     Assessment:       1. MCTD (mixed connective tissue disease)    2. Fibromyalgia syndrome    3. Primary insomnia    4. S/P discectomy for herniated nucleus pulposus              Plan:       Problem List Items Addressed This Visit    None  Visit Diagnoses       MCTD (mixed connective tissue disease)    -  Primary    Relevant Medications    hydrOXYchloroQUINE (PLAQUENIL) 200 mg tablet    tiZANidine (ZANAFLEX) 4 MG tablet    pregabalin (LYRICA) 225 MG Cap    diclofenac  (VOLTAREN) 50 MG EC tablet    omeprazole (PRILOSEC) 40 MG capsule    Fibromyalgia syndrome        Relevant Medications    hydrOXYchloroQUINE (PLAQUENIL) 200 mg tablet    tiZANidine (ZANAFLEX) 4 MG tablet    pregabalin (LYRICA) 225 MG Cap    diclofenac (VOLTAREN) 50 MG EC tablet    omeprazole (PRILOSEC) 40 MG capsule    Primary insomnia        Relevant Medications    hydrOXYchloroQUINE (PLAQUENIL) 200 mg tablet    tiZANidine (ZANAFLEX) 4 MG tablet    pregabalin (LYRICA) 225 MG Cap    diclofenac (VOLTAREN) 50 MG EC tablet    omeprazole (PRILOSEC) 40 MG capsule    S/P discectomy for herniated nucleus pulposus        Relevant Medications    hydrOXYchloroQUINE (PLAQUENIL) 200 mg tablet    tiZANidine (ZANAFLEX) 4 MG tablet    pregabalin (LYRICA) 225 MG Cap    diclofenac (VOLTAREN) 50 MG EC tablet    omeprazole (PRILOSEC) 40 MG capsule

## 2022-10-12 ENCOUNTER — TELEPHONE (OUTPATIENT)
Dept: FAMILY MEDICINE | Facility: CLINIC | Age: 67
End: 2022-10-12
Payer: MEDICARE

## 2022-10-12 DIAGNOSIS — E11.42 CONTROLLED TYPE 2 DIABETES MELLITUS WITH DIABETIC POLYNEUROPATHY, WITHOUT LONG-TERM CURRENT USE OF INSULIN: Primary | ICD-10-CM

## 2022-10-12 DIAGNOSIS — I73.9 PVD (PERIPHERAL VASCULAR DISEASE): ICD-10-CM

## 2022-10-12 NOTE — TELEPHONE ENCOUNTER
----- Message from Soo Celestin MA sent at 10/12/2022  3:08 PM CDT -----    ----- Message -----  From: Eder Briseno  Sent: 10/12/2022   2:59 PM CDT  To: [facility] Staff    .Type:  Diabetic/Medical Supplies Request    Name of Caller: pt  What supplies are needed: diabetic shoes  What is the brand of the supplies:  Refill or New Rx: Refill  If checking glucose, how many times do they check it?:  Who prescribed the original supplies:  Pharmacy/Company Name, Phone #, Location:[facility] [address] [place] [address]  Requesting a Call Back?: call back  Would the patient rather a call back or a response via [facility]? Call back  Best Call Back Number:[phone]   Additional Information:

## 2022-10-14 ENCOUNTER — HOSPITAL ENCOUNTER (OUTPATIENT)
Dept: RADIOLOGY | Facility: HOSPITAL | Age: 67
Discharge: HOME | End: 2022-10-14
Attending: FAMILY MEDICINE
Payer: MEDICARE

## 2022-10-14 DIAGNOSIS — R63.4 UNINTENTIONAL WEIGHT LOSS: ICD-10-CM

## 2022-10-14 DIAGNOSIS — R63.0 DECREASED APPETITE: ICD-10-CM

## 2022-10-14 DIAGNOSIS — Z12.31 VISIT FOR SCREENING MAMMOGRAM: ICD-10-CM

## 2022-10-14 LAB
CREAT SERPL-MCNC: 1.4 MG/DL (ref 0.5–1.4)
SAMPLE: NORMAL

## 2022-10-14 PROCEDURE — 71250 CT THORAX DX C-: CPT | Mod: TC

## 2022-10-14 PROCEDURE — 77067 SCR MAMMO BI INCL CAD: CPT | Mod: 26,,, | Performed by: RADIOLOGY

## 2022-10-14 PROCEDURE — 70450 CT HEAD/BRAIN W/O DYE: CPT | Mod: TC

## 2022-10-14 PROCEDURE — 74178 CT ABD&PLV WO CNTR FLWD CNTR: CPT | Mod: TC

## 2022-10-14 PROCEDURE — 77063 BREAST TOMOSYNTHESIS BI: CPT | Mod: 26,,, | Performed by: RADIOLOGY

## 2022-10-14 PROCEDURE — 77067 MAMMO DIGITAL SCREENING BILAT WITH TOMO: ICD-10-PCS | Mod: 26,,, | Performed by: RADIOLOGY

## 2022-10-14 PROCEDURE — 25500020 PHARM REV CODE 255: Performed by: FAMILY MEDICINE

## 2022-10-14 PROCEDURE — 77063 MAMMO DIGITAL SCREENING BILAT WITH TOMO: ICD-10-PCS | Mod: 26,,, | Performed by: RADIOLOGY

## 2022-10-14 PROCEDURE — 77067 SCR MAMMO BI INCL CAD: CPT | Mod: TC

## 2022-10-14 RX ADMIN — IOPAMIDOL 100 ML: 755 INJECTION, SOLUTION INTRAVENOUS at 12:10

## 2022-10-14 RX ADMIN — DIATRIZOATE MEGLUMINE AND DIATRIZOATE SODIUM 30 ML: 660; 100 LIQUID ORAL; RECTAL at 11:10

## 2022-10-18 DIAGNOSIS — N28.9 MILD RENAL INSUFFICIENCY: Primary | ICD-10-CM

## 2022-12-05 ENCOUNTER — TELEPHONE (OUTPATIENT)
Dept: FAMILY MEDICINE | Facility: CLINIC | Age: 67
End: 2022-12-05
Payer: MEDICARE

## 2022-12-05 NOTE — TELEPHONE ENCOUNTER
Do you want to schedule an appt for this ?    ----- Message from Eder Finn sent at 12/5/2022 10:56 AM CST -----  .Type:  Diabetic/Medical Supplies Request    Name of Caller:pt  What supplies are needed:diabetic shoes  What is the brand of the supplies:  Refill or New Rx:Refill  If checking glucose, how many times do they check it?:  Who prescribed the original supplies:  Pharmacy/Company Name, Phone #, Location: [facility]  Requesting a Call Back?:  Would the patient rather a call back or a response via [facility]? Call back  Best Call Back Number: [phone]  Additional Information:

## 2022-12-07 ENCOUNTER — TELEPHONE (OUTPATIENT)
Dept: FAMILY MEDICINE | Facility: CLINIC | Age: 67
End: 2022-12-07
Payer: MEDICARE

## 2022-12-08 ENCOUNTER — DOCUMENTATION ONLY (OUTPATIENT)
Dept: INTERNAL MEDICINE | Facility: CLINIC | Age: 67
End: 2022-12-08
Payer: MEDICARE

## 2022-12-09 ENCOUNTER — TELEPHONE (OUTPATIENT)
Dept: ADMINISTRATIVE | Facility: HOSPITAL | Age: 67
End: 2022-12-09
Payer: MEDICARE

## 2022-12-09 NOTE — TELEPHONE ENCOUNTER
----- Message from Ruiz Mack LPN sent at 12/9/2022  8:19 AM CST -----  Can patient be scheduled for a Diabetic Foot Exam for Diabetic Shoes with the next available provider please

## 2022-12-16 ENCOUNTER — TELEPHONE (OUTPATIENT)
Dept: PRIMARY CARE CLINIC | Facility: CLINIC | Age: 67
End: 2022-12-16
Payer: MEDICARE

## 2022-12-16 NOTE — TELEPHONE ENCOUNTER
----- Message from Bonny Little sent at 12/16/2022  9:21 AM CST -----  Regarding: Diabetic Meds  Pt said she needs a refill on her Diabetic medication ASAP. Pt said she been out of medication. Pt has an appt on 01/05/23.

## 2023-02-02 ENCOUNTER — DOCUMENTATION ONLY (OUTPATIENT)
Dept: ADMINISTRATIVE | Facility: HOSPITAL | Age: 68
End: 2023-02-02
Payer: MEDICARE

## 2023-02-02 ENCOUNTER — DOCUMENTATION ONLY (OUTPATIENT)
Dept: FAMILY MEDICINE | Facility: CLINIC | Age: 68
End: 2023-02-02
Payer: MEDICARE

## 2023-02-27 ENCOUNTER — OFFICE VISIT (OUTPATIENT)
Dept: RHEUMATOLOGY | Facility: CLINIC | Age: 68
End: 2023-02-27
Payer: MEDICARE

## 2023-02-27 VITALS
TEMPERATURE: 99 F | HEART RATE: 59 BPM | SYSTOLIC BLOOD PRESSURE: 136 MMHG | OXYGEN SATURATION: 99 % | BODY MASS INDEX: 31.09 KG/M2 | WEIGHT: 158.38 LBS | HEIGHT: 60 IN | DIASTOLIC BLOOD PRESSURE: 82 MMHG

## 2023-02-27 DIAGNOSIS — I10 PRIMARY HYPERTENSION: ICD-10-CM

## 2023-02-27 DIAGNOSIS — D57.3 SICKLE CELL TRAIT: ICD-10-CM

## 2023-02-27 DIAGNOSIS — Z87.39 S/P DISCECTOMY FOR HERNIATED NUCLEUS PULPOSUS: ICD-10-CM

## 2023-02-27 DIAGNOSIS — G43.109 MIGRAINE WITH AURA AND WITHOUT STATUS MIGRAINOSUS, NOT INTRACTABLE: ICD-10-CM

## 2023-02-27 DIAGNOSIS — M48.061 DEGENERATIVE LUMBAR SPINAL STENOSIS: ICD-10-CM

## 2023-02-27 DIAGNOSIS — Z98.890 S/P DISCECTOMY FOR HERNIATED NUCLEUS PULPOSUS: ICD-10-CM

## 2023-02-27 DIAGNOSIS — M79.7 FIBROMYALGIA SYNDROME: ICD-10-CM

## 2023-02-27 DIAGNOSIS — G62.9 NEUROPATHY: ICD-10-CM

## 2023-02-27 DIAGNOSIS — F51.01 PRIMARY INSOMNIA: ICD-10-CM

## 2023-02-27 DIAGNOSIS — G47.00 INSOMNIA, UNSPECIFIED TYPE: ICD-10-CM

## 2023-02-27 DIAGNOSIS — N28.9 RENAL INSUFFICIENCY: ICD-10-CM

## 2023-02-27 DIAGNOSIS — E55.9 VITAMIN D DEFICIENCY: ICD-10-CM

## 2023-02-27 DIAGNOSIS — M35.1 MCTD (MIXED CONNECTIVE TISSUE DISEASE): Primary | ICD-10-CM

## 2023-02-27 PROBLEM — G43.909 MIGRAINE HEADACHE: Status: ACTIVE | Noted: 2023-02-27

## 2023-02-27 PROBLEM — D50.9 IRON DEFICIENCY ANEMIA: Status: ACTIVE | Noted: 2023-02-27

## 2023-02-27 PROCEDURE — 99999 PR PBB SHADOW E&M-EST. PATIENT-LVL IV: ICD-10-PCS | Mod: PBBFAC,,, | Performed by: INTERNAL MEDICINE

## 2023-02-27 PROCEDURE — 99214 PR OFFICE/OUTPT VISIT, EST, LEVL IV, 30-39 MIN: ICD-10-PCS | Mod: S$PBB,,, | Performed by: INTERNAL MEDICINE

## 2023-02-27 PROCEDURE — 99214 OFFICE O/P EST MOD 30 MIN: CPT | Mod: PBBFAC | Performed by: INTERNAL MEDICINE

## 2023-02-27 PROCEDURE — 99214 OFFICE O/P EST MOD 30 MIN: CPT | Mod: S$PBB,,, | Performed by: INTERNAL MEDICINE

## 2023-02-27 PROCEDURE — 99999 PR PBB SHADOW E&M-EST. PATIENT-LVL IV: CPT | Mod: PBBFAC,,, | Performed by: INTERNAL MEDICINE

## 2023-02-27 RX ORDER — TIZANIDINE 4 MG/1
8 TABLET ORAL NIGHTLY
Qty: 180 TABLET | Refills: 3 | Status: SHIPPED | OUTPATIENT
Start: 2023-02-27 | End: 2023-06-27 | Stop reason: SDUPTHER

## 2023-02-27 RX ORDER — HYDROXYCHLOROQUINE SULFATE 200 MG/1
200 TABLET, FILM COATED ORAL 2 TIMES DAILY
Qty: 180 TABLET | Refills: 3 | Status: SHIPPED | OUTPATIENT
Start: 2023-02-27 | End: 2023-06-27 | Stop reason: SDUPTHER

## 2023-02-27 RX ORDER — OMEPRAZOLE 40 MG/1
40 CAPSULE, DELAYED RELEASE ORAL DAILY
Qty: 90 CAPSULE | Refills: 3 | Status: SHIPPED | OUTPATIENT
Start: 2023-02-27 | End: 2023-06-27

## 2023-02-27 RX ORDER — MYCOPHENOLATE MOFETIL 500 MG/1
1000 TABLET ORAL
Qty: 180 TABLET | Refills: 3 | Status: SHIPPED | OUTPATIENT
Start: 2023-02-27 | End: 2023-06-27 | Stop reason: SDUPTHER

## 2023-02-27 RX ORDER — GABAPENTIN 100 MG/1
100 CAPSULE ORAL 3 TIMES DAILY
Qty: 270 CAPSULE | Refills: 3 | Status: SHIPPED | OUTPATIENT
Start: 2023-02-27 | End: 2023-06-27 | Stop reason: SDUPTHER

## 2023-02-27 NOTE — PROGRESS NOTES
Subjective:       Patient ID: Cheryl Solis is a 67 y.o. female.    Chief Complaint: Follow-up (Follow up. Patient states she is in pain 8/10)    The patient is complaining of joint pain involving the MCP PIP wrist elbow shoulders hips knees and ankles bilaterally.  The pain is 5/10 in intensity dull in quality and continuous.  That is associated with a morning stiffness lasting for more than 60 minutes.  Is also having difficulty maintaining a good night of sleep.  This has been associated with myalgias.  Muscle aches are 5/10 in intensity dull in quality and continuous.  They are associated with fatigue.  No fever no chills no others.      Review of Systems   Constitutional:  Negative for appetite change, chills and fever.   HENT:  Negative for congestion, ear pain, mouth sores, nosebleeds and trouble swallowing.    Eyes:  Negative for photophobia and discharge.   Respiratory:  Negative for chest tightness and shortness of breath.    Cardiovascular:  Negative for chest pain.   Gastrointestinal:  Negative for abdominal pain and vomiting.   Endocrine: Negative.    Genitourinary:  Negative for hematuria.   Musculoskeletal:         As per HPI   Skin:  Negative for rash.   Neurological:  Negative for weakness.       Objective:   /82 (BP Location: Left arm, Patient Position: Sitting, BP Method: Medium (Automatic))   Pulse (!) 59   Temp 98.7 °F (37.1 °C) (Oral)   Ht 5' (1.524 m)   Wt 71.8 kg (158 lb 6.4 oz)   SpO2 99%   BMI 30.94 kg/m²      Physical Exam   Constitutional: She is oriented to person, place, and time. She appears well-developed and well-nourished. No distress.   HENT:   Head: Normocephalic and atraumatic.   Right Ear: External ear normal.   Left Ear: External ear normal.   Eyes: Pupils are equal, round, and reactive to light.   Cardiovascular: Normal rate, regular rhythm and normal heart sounds.   Pulmonary/Chest: Breath sounds normal.   Abdominal: Soft. There is no abdominal tenderness.    Musculoskeletal:      Right shoulder: Tenderness present.      Left shoulder: Tenderness present.      Right elbow: Tenderness present.      Left elbow: Tenderness present.      Right wrist: Tenderness present.      Left wrist: Tenderness present.      Cervical back: Neck supple.      Right hip: Tenderness present.      Left hip: Tenderness present.      Right knee: Tenderness present.      Left knee: Tenderness present.      Right ankle: Tenderness present.      Left ankle: Tenderness present.   Lymphadenopathy:     She has no cervical adenopathy.   Neurological: She is alert and oriented to person, place, and time. She displays normal reflexes. No cranial nerve deficit or sensory deficit. She exhibits normal muscle tone. Coordination normal.   Skin: No rash noted. No erythema.   Vitals reviewed.      Right Side Rheumatological Exam     The patient is tender to palpation of the shoulder, elbow, wrist, knee, 1st PIP, 1st MCP, 2nd PIP, 2nd MCP, 3rd PIP, 3rd MCP, 4th PIP, 4th MCP, 5th PIP, hip, ankle, 1st MTP, 2nd MTP, 3rd MTP, 4th MTP, 5th MTP, 1st toe IP, 2nd toe IP, 3rd toe IP, 4th toe IP and 5th toe IP    Left Side Rheumatological Exam     The patient is tender to palpation of the shoulder, elbow, wrist, knee, 1st PIP, 1st MCP, 2nd PIP, 2nd MCP, 3rd PIP, 3rd MCP, 4th PIP, 4th MCP, 5th PIP, 5th MCP, hip, ankle, 1st MTP, 2nd MTP, 3rd MTP, 4th MTP, 5th MTP, 1st toe IP, 2nd toe IP, 3rd toe IP, 4th toe IP and 5th toe IP.       Completed Fibromyalgia exam 18/18 tender points.  No data to display     Assessment:       1. MCTD (mixed connective tissue disease)    2. Fibromyalgia syndrome    3. Insomnia, unspecified type    4. Vitamin D deficiency    5. Sickle cell trait    6. Renal insufficiency    7. Primary hypertension    8. Migraine with aura and without status migrainosus, not intractable    9. Degenerative lumbar spinal stenosis    10. Neuropathy    11. Primary insomnia    12. S/P discectomy for herniated nucleus  pulposus            Medication List with Changes/Refills   New Medications    FOLIC ACID-VIT B6-VIT B12 2.5-25-2 MG (FOLBIC OR EQUIV) 2.5-25-2 MG TAB    Take 1 tablet by mouth once daily. After breakfast       Start Date: 2/27/2023 End Date: --    GABAPENTIN (NEURONTIN) 100 MG CAPSULE    Take 1 capsule (100 mg total) by mouth 3 (three) times daily.       Start Date: 2/27/2023 End Date: 2/27/2024    MYCOPHENOLATE (CELLCEPT) 500 MG TAB    Take 2 tablets (1,000 mg total) by mouth daily with dinner or evening meal.       Start Date: 2/27/2023 End Date: 2/27/2024   Current Medications    ALBUTEROL (PROVENTIL) 2.5 MG /3 ML (0.083 %) NEBULIZER SOLUTION    use one vial in nebulizer every 4 to 6 hours as needed for wheezing       Start Date: 5/24/2022 End Date: --    AMLODIPINE-BENAZEPRIL 5-10 MG (LOTREL) 5-10 MG PER CAPSULE    TAKE ONE CAPSULE BY MOUTH DAILY       Start Date: 5/26/2022 End Date: --    ASPIRIN (ECOTRIN) 81 MG EC TABLET    Take 1 tablet (81 mg total) by mouth after lunch.       Start Date: 10/6/2022 End Date: 10/6/2023    CETIRIZINE (ZYRTEC) 10 MG TABLET    Take 10 mg by mouth every evening.       Start Date: 10/4/2022 End Date: --    EPINEPHRINE (EPIPEN) 0.3 MG/0.3 ML ATIN    Inject 1 each into the muscle daily as needed.       Start Date: 4/25/2022 End Date: --    EZETIMIBE-SIMVASTATIN 10-40 MG (VYTORIN) 10-40 MG PER TABLET    Take 1 tablet by mouth once daily.       Start Date: 7/30/2021 End Date: --    FERROUS GLUCONATE (FERGON) 324 MG TABLET    TAKE ONE TABLET BY MOUTH THREE TIMES DAILY WITH FOOD       Start Date: 6/6/2022  End Date: --    FLUTICASONE PROPIONATE (FLONASE) 50 MCG/ACTUATION NASAL SPRAY    2 sprays by Each Nostril route once daily.       Start Date: 6/3/2022  End Date: --    HYDROCHLOROTHIAZIDE (HYDRODIURIL) 25 MG TABLET    TAKE ONE TABLET BY MOUTH TWICE DAILY       Start Date: 6/13/2022 End Date: --    HYDROCODONE-ACETAMINOPHEN (NORCO)  MG PER TABLET    Take 1 tablet by mouth every 4  (four) hours.       Start Date: 6/3/2022  End Date: --    HYDROXYZINE HCL (ATARAX) 25 MG TABLET    Take 25 mg by mouth every 8 (eight) hours as needed.       Start Date: 5/19/2022 End Date: --    ICOSAPENT ETHYL (VASCEPA) 1 GRAM CAP    Take 2 capsules (2,000 mg total) by mouth 2 (two) times daily.       Start Date: 5/10/2022 End Date: --    MONTELUKAST (SINGULAIR) 10 MG TABLET    Take 10 mg by mouth once daily.       Start Date: 6/22/2021 End Date: --    OMALIZUMAB (XOLAIR) 150 MG/ML INJECTION    Inject 300 mg into the skin every 30 days.       Start Date: 9/14/2021 End Date: --    TRAZODONE (DESYREL) 50 MG TABLET    Take 50 mg by mouth nightly.       Start Date: 6/3/2022  End Date: --    VENTOLIN HFA 90 MCG/ACTUATION INHALER    use 2 puffs by mouth every 6 hours as needed for wheezing       Start Date: 5/24/2022 End Date: --   Changed and/or Refilled Medications    Modified Medication Previous Medication    HYDROXYCHLOROQUINE (PLAQUENIL) 200 MG TABLET hydrOXYchloroQUINE (PLAQUENIL) 200 mg tablet       Take 1 tablet (200 mg total) by mouth 2 (two) times daily.    Take 1 tablet (200 mg total) by mouth 2 (two) times daily.       Start Date: 2/27/2023 End Date: --    Start Date: 10/6/2022 End Date: 2/27/2023    OMEPRAZOLE (PRILOSEC) 40 MG CAPSULE omeprazole (PRILOSEC) 40 MG capsule       Take 1 capsule (40 mg total) by mouth once daily.    Take 1 capsule (40 mg total) by mouth once daily.       Start Date: 2/27/2023 End Date: --    Start Date: 10/6/2022 End Date: 2/27/2023    TIZANIDINE (ZANAFLEX) 4 MG TABLET tiZANidine (ZANAFLEX) 4 MG tablet       Take 2 tablets (8 mg total) by mouth nightly.    Take 2 tablets (8 mg total) by mouth nightly.       Start Date: 2/27/2023 End Date: --    Start Date: 10/6/2022 End Date: 2/27/2023   Discontinued Medications    DICLOFENAC (VOLTAREN) 50 MG EC TABLET    Take 1 tablet (50 mg total) by mouth 2 (two) times daily as needed (pain, after food).       Start Date: 10/6/2022 End Date:  2/27/2023    PREGABALIN (LYRICA) 225 MG CAP    Take 1 capsule (225 mg total) by mouth 2 (two) times daily.       Start Date: 10/6/2022 End Date: 2/27/2023         Plan:         Problem List Items Addressed This Visit          Neuro    Degenerative lumbar spinal stenosis    Relevant Medications    gabapentin (NEURONTIN) 100 MG capsule    folic acid-vit B6-vit B12 2.5-25-2 mg (FOLBIC OR EQUIV) 2.5-25-2 mg Tab    tiZANidine (ZANAFLEX) 4 MG tablet    omeprazole (PRILOSEC) 40 MG capsule    hydrOXYchloroQUINE (PLAQUENIL) 200 mg tablet    mycophenolate (CELLCEPT) 500 mg Tab    Migraine headache    Relevant Medications    gabapentin (NEURONTIN) 100 MG capsule    folic acid-vit B6-vit B12 2.5-25-2 mg (FOLBIC OR EQUIV) 2.5-25-2 mg Tab    tiZANidine (ZANAFLEX) 4 MG tablet    omeprazole (PRILOSEC) 40 MG capsule    hydrOXYchloroQUINE (PLAQUENIL) 200 mg tablet    mycophenolate (CELLCEPT) 500 mg Tab    Neuropathy    Relevant Medications    gabapentin (NEURONTIN) 100 MG capsule    folic acid-vit B6-vit B12 2.5-25-2 mg (FOLBIC OR EQUIV) 2.5-25-2 mg Tab    tiZANidine (ZANAFLEX) 4 MG tablet    omeprazole (PRILOSEC) 40 MG capsule    hydrOXYchloroQUINE (PLAQUENIL) 200 mg tablet    mycophenolate (CELLCEPT) 500 mg Tab       Cardiac/Vascular    Primary hypertension    Relevant Medications    gabapentin (NEURONTIN) 100 MG capsule    folic acid-vit B6-vit B12 2.5-25-2 mg (FOLBIC OR EQUIV) 2.5-25-2 mg Tab    tiZANidine (ZANAFLEX) 4 MG tablet    omeprazole (PRILOSEC) 40 MG capsule    hydrOXYchloroQUINE (PLAQUENIL) 200 mg tablet    mycophenolate (CELLCEPT) 500 mg Tab       Renal/    Renal insufficiency    Relevant Medications    gabapentin (NEURONTIN) 100 MG capsule    folic acid-vit B6-vit B12 2.5-25-2 mg (FOLBIC OR EQUIV) 2.5-25-2 mg Tab    tiZANidine (ZANAFLEX) 4 MG tablet    omeprazole (PRILOSEC) 40 MG capsule    hydrOXYchloroQUINE (PLAQUENIL) 200 mg tablet    mycophenolate (CELLCEPT) 500 mg Tab       Immunology/Multi System    MCTD  (mixed connective tissue disease) - Primary    Relevant Medications    gabapentin (NEURONTIN) 100 MG capsule    folic acid-vit B6-vit B12 2.5-25-2 mg (FOLBIC OR EQUIV) 2.5-25-2 mg Tab    tiZANidine (ZANAFLEX) 4 MG tablet    omeprazole (PRILOSEC) 40 MG capsule    hydrOXYchloroQUINE (PLAQUENIL) 200 mg tablet    mycophenolate (CELLCEPT) 500 mg Tab       Oncology    Sickle cell trait    Relevant Medications    gabapentin (NEURONTIN) 100 MG capsule    folic acid-vit B6-vit B12 2.5-25-2 mg (FOLBIC OR EQUIV) 2.5-25-2 mg Tab    tiZANidine (ZANAFLEX) 4 MG tablet    omeprazole (PRILOSEC) 40 MG capsule    hydrOXYchloroQUINE (PLAQUENIL) 200 mg tablet    mycophenolate (CELLCEPT) 500 mg Tab       Endocrine    Vitamin D deficiency    Relevant Medications    gabapentin (NEURONTIN) 100 MG capsule    folic acid-vit B6-vit B12 2.5-25-2 mg (FOLBIC OR EQUIV) 2.5-25-2 mg Tab    tiZANidine (ZANAFLEX) 4 MG tablet    omeprazole (PRILOSEC) 40 MG capsule    hydrOXYchloroQUINE (PLAQUENIL) 200 mg tablet    mycophenolate (CELLCEPT) 500 mg Tab       Orthopedic    Fibromyalgia syndrome    Relevant Medications    gabapentin (NEURONTIN) 100 MG capsule    folic acid-vit B6-vit B12 2.5-25-2 mg (FOLBIC OR EQUIV) 2.5-25-2 mg Tab    tiZANidine (ZANAFLEX) 4 MG tablet    omeprazole (PRILOSEC) 40 MG capsule    hydrOXYchloroQUINE (PLAQUENIL) 200 mg tablet    mycophenolate (CELLCEPT) 500 mg Tab       Other    Insomnia    Relevant Medications    gabapentin (NEURONTIN) 100 MG capsule    folic acid-vit B6-vit B12 2.5-25-2 mg (FOLBIC OR EQUIV) 2.5-25-2 mg Tab    tiZANidine (ZANAFLEX) 4 MG tablet    omeprazole (PRILOSEC) 40 MG capsule    hydrOXYchloroQUINE (PLAQUENIL) 200 mg tablet    mycophenolate (CELLCEPT) 500 mg Tab     Other Visit Diagnoses       S/P discectomy for herniated nucleus pulposus        Relevant Medications    gabapentin (NEURONTIN) 100 MG capsule    folic acid-vit B6-vit B12 2.5-25-2 mg (FOLBIC OR EQUIV) 2.5-25-2 mg Tab    tiZANidine (ZANAFLEX) 4  MG tablet    omeprazole (PRILOSEC) 40 MG capsule    hydrOXYchloroQUINE (PLAQUENIL) 200 mg tablet    mycophenolate (CELLCEPT) 500 mg Tab

## 2023-03-20 ENCOUNTER — HOSPITAL ENCOUNTER (EMERGENCY)
Facility: HOSPITAL | Age: 68
Discharge: HOME | End: 2023-03-20
Attending: FAMILY MEDICINE
Payer: MEDICARE

## 2023-03-20 VITALS
BODY MASS INDEX: 29.45 KG/M2 | TEMPERATURE: 99 F | RESPIRATION RATE: 18 BRPM | SYSTOLIC BLOOD PRESSURE: 161 MMHG | HEART RATE: 73 BPM | HEIGHT: 60 IN | DIASTOLIC BLOOD PRESSURE: 90 MMHG | OXYGEN SATURATION: 98 % | WEIGHT: 150 LBS

## 2023-03-20 DIAGNOSIS — A08.4 VIRAL GASTROENTERITIS: Primary | ICD-10-CM

## 2023-03-20 DIAGNOSIS — R52 PAIN: ICD-10-CM

## 2023-03-20 DIAGNOSIS — R07.9 CHEST PAIN: ICD-10-CM

## 2023-03-20 LAB
ALBUMIN SERPL-MCNC: 4.4 G/DL (ref 3.4–4.8)
ALBUMIN/GLOB SERPL: 1 RATIO (ref 1.1–2)
ALP SERPL-CCNC: 69 UNIT/L (ref 40–150)
ALT SERPL-CCNC: 16 UNIT/L (ref 0–55)
APPEARANCE UR: CLEAR
AST SERPL-CCNC: 16 UNIT/L (ref 5–34)
BACTERIA #/AREA URNS AUTO: NORMAL /HPF
BASOPHILS # BLD AUTO: 0.02 X10(3)/MCL (ref 0–0.2)
BASOPHILS NFR BLD AUTO: 0.1 %
BILIRUB UR QL STRIP.AUTO: NEGATIVE MG/DL
BILIRUBIN DIRECT+TOT PNL SERPL-MCNC: 0.8 MG/DL
BUN SERPL-MCNC: 15.9 MG/DL (ref 9.8–20.1)
CALCIUM SERPL-MCNC: 9.8 MG/DL (ref 8.4–10.2)
CHLORIDE SERPL-SCNC: 99 MMOL/L (ref 98–107)
CO2 SERPL-SCNC: 22 MMOL/L (ref 23–31)
COLOR UR AUTO: YELLOW
CREAT SERPL-MCNC: 0.95 MG/DL (ref 0.55–1.02)
EOSINOPHIL # BLD AUTO: 0 X10(3)/MCL (ref 0–0.9)
EOSINOPHIL NFR BLD AUTO: 0 %
ERYTHROCYTE [DISTWIDTH] IN BLOOD BY AUTOMATED COUNT: 13.2 % (ref 11.5–17)
FLUAV AG UPPER RESP QL IA.RAPID: NOT DETECTED
FLUBV AG UPPER RESP QL IA.RAPID: NOT DETECTED
GFR SERPLBLD CREATININE-BSD FMLA CKD-EPI: >60 MLS/MIN/1.73/M2
GLOBULIN SER-MCNC: 4.2 GM/DL (ref 2.4–3.5)
GLUCOSE SERPL-MCNC: 228 MG/DL (ref 82–115)
GLUCOSE UR QL STRIP.AUTO: 500 MG/DL
HCT VFR BLD AUTO: 32.1 % (ref 37–47)
HGB BLD-MCNC: 11.4 G/DL (ref 12–16)
IMM GRANULOCYTES # BLD AUTO: 0.02 X10(3)/MCL (ref 0–0.04)
IMM GRANULOCYTES NFR BLD AUTO: 0.1 %
KETONES UR QL STRIP.AUTO: 80 MG/DL
LEUKOCYTE ESTERASE UR QL STRIP.AUTO: NEGATIVE UNIT/L
LIPASE SERPL-CCNC: 11 U/L
LYMPHOCYTES # BLD AUTO: 0.46 X10(3)/MCL (ref 0.6–4.6)
LYMPHOCYTES NFR BLD AUTO: 3.2 %
MCH RBC QN AUTO: 28.1 PG
MCHC RBC AUTO-ENTMCNC: 35.5 G/DL (ref 33–36)
MCV RBC AUTO: 79.3 FL (ref 80–94)
MONOCYTES # BLD AUTO: 0.35 X10(3)/MCL (ref 0.1–1.3)
MONOCYTES NFR BLD AUTO: 2.4 %
NEUTROPHILS # BLD AUTO: 13.48 X10(3)/MCL (ref 2.1–9.2)
NEUTROPHILS NFR BLD AUTO: 94.2 %
NITRITE UR QL STRIP.AUTO: NEGATIVE
PH UR STRIP.AUTO: 7 [PH]
PLATELET # BLD AUTO: 343 X10(3)/MCL (ref 130–400)
PMV BLD AUTO: 10.6 FL (ref 7.4–10.4)
POTASSIUM SERPL-SCNC: 3.5 MMOL/L (ref 3.5–5.1)
PROT SERPL-MCNC: 8.6 GM/DL (ref 5.8–7.6)
PROT UR QL STRIP.AUTO: 30 MG/DL
RBC # BLD AUTO: 4.05 X10(6)/MCL (ref 4.2–5.4)
RBC #/AREA URNS AUTO: NORMAL /HPF
RBC UR QL AUTO: NEGATIVE UNIT/L
SARS-COV-2 RNA RESP QL NAA+PROBE: NOT DETECTED
SODIUM SERPL-SCNC: 136 MMOL/L (ref 136–145)
SP GR UR STRIP.AUTO: 1.02
SQUAMOUS #/AREA URNS AUTO: NORMAL /HPF
UROBILINOGEN UR STRIP-ACNC: 0.2 MG/DL
WBC # SPEC AUTO: 14.3 X10(3)/MCL (ref 4.5–11.5)
WBC #/AREA URNS AUTO: NORMAL /HPF

## 2023-03-20 PROCEDURE — 93010 EKG 12-LEAD: ICD-10-PCS | Mod: ,,, | Performed by: INTERNAL MEDICINE

## 2023-03-20 PROCEDURE — 96374 THER/PROPH/DIAG INJ IV PUSH: CPT

## 2023-03-20 PROCEDURE — 96361 HYDRATE IV INFUSION ADD-ON: CPT

## 2023-03-20 PROCEDURE — 25000003 PHARM REV CODE 250: Performed by: FAMILY MEDICINE

## 2023-03-20 PROCEDURE — 0240U COVID/FLU A&B PCR: CPT | Performed by: FAMILY MEDICINE

## 2023-03-20 PROCEDURE — 85025 COMPLETE CBC W/AUTO DIFF WBC: CPT | Performed by: FAMILY MEDICINE

## 2023-03-20 PROCEDURE — 83690 ASSAY OF LIPASE: CPT | Performed by: FAMILY MEDICINE

## 2023-03-20 PROCEDURE — 93010 ELECTROCARDIOGRAM REPORT: CPT | Mod: ,,, | Performed by: INTERNAL MEDICINE

## 2023-03-20 PROCEDURE — 93005 ELECTROCARDIOGRAM TRACING: CPT

## 2023-03-20 PROCEDURE — 80053 COMPREHEN METABOLIC PANEL: CPT | Performed by: FAMILY MEDICINE

## 2023-03-20 PROCEDURE — 81001 URINALYSIS AUTO W/SCOPE: CPT | Performed by: FAMILY MEDICINE

## 2023-03-20 PROCEDURE — 63600175 PHARM REV CODE 636 W HCPCS: Performed by: FAMILY MEDICINE

## 2023-03-20 PROCEDURE — 99285 EMERGENCY DEPT VISIT HI MDM: CPT | Mod: 25,CS

## 2023-03-20 RX ORDER — ONDANSETRON 4 MG/1
4 TABLET, FILM COATED ORAL EVERY 6 HOURS
Qty: 12 TABLET | Refills: 0 | Status: SHIPPED | OUTPATIENT
Start: 2023-03-20

## 2023-03-20 RX ORDER — ONDANSETRON 2 MG/ML
4 INJECTION INTRAMUSCULAR; INTRAVENOUS
Status: COMPLETED | OUTPATIENT
Start: 2023-03-20 | End: 2023-03-20

## 2023-03-20 RX ORDER — DICYCLOMINE HYDROCHLORIDE 20 MG/1
20 TABLET ORAL 3 TIMES DAILY
Qty: 15 TABLET | Refills: 0 | Status: SHIPPED | OUTPATIENT
Start: 2023-03-20 | End: 2023-03-25

## 2023-03-20 RX ORDER — ONDANSETRON 4 MG/1
4 TABLET, ORALLY DISINTEGRATING ORAL
Status: COMPLETED | OUTPATIENT
Start: 2023-03-20 | End: 2023-03-20

## 2023-03-20 RX ADMIN — ONDANSETRON 4 MG: 2 INJECTION INTRAMUSCULAR; INTRAVENOUS at 12:03

## 2023-03-20 RX ADMIN — SODIUM CHLORIDE 500 ML: 9 INJECTION, SOLUTION INTRAVENOUS at 10:03

## 2023-03-20 RX ADMIN — ONDANSETRON 4 MG: 4 TABLET, ORALLY DISINTEGRATING ORAL at 10:03

## 2023-03-20 NOTE — ED PROVIDER NOTES
Encounter Date: 3/20/2023       History     Chief Complaint   Patient presents with    Emesis     N/V/D and weakness that began yesterday afternoon.      Nausea vomiting diarrhea   67-year-old comes in with complaint of nausea vomiting diarrhea while patient was here she developed some burning in the chest otherwise patient has no nausea at this time no vomiting at this time but does have a low-grade temperature 99.3° no abdominal pain no chronic condition contributing to today's episode.      Review of patient's allergies indicates:   Allergen Reactions    Metformin Rash     Rash (skin)^  Rash (skin)^      Morphine Itching     Itchy (skin)^  Itchy (skin)^      Shellfish containing products     Ibuprofen Rash     Hives (skin)^  Hives (skin)^       Past Medical History:   Diagnosis Date    Anemia, unspecified     Fibromyalgia     HTN (hypertension)     Mixed hyperlipidemia     Obesity, unspecified     Sickle-cell disease without crisis     Sleep apnea, unspecified     Type 2 diabetes mellitus without complications     Vitamin D deficiency      Past Surgical History:   Procedure Laterality Date    BACK SURGERY      CARPAL TUNNEL RELEASE Bilateral      SECTION       SECTION       SECTION      COLONOSCOPY      COLONOSCOPY W/ POLYPECTOMY  2013    FOOT SURGERY Left     lasar eye       SYMPATHECTOMY       Family History   Problem Relation Age of Onset    Cancer Mother     Stroke Sister     Hypertension Sister     Drug abuse Brother     Alcohol abuse Brother      Social History     Tobacco Use    Smoking status: Never    Smokeless tobacco: Never   Substance Use Topics    Alcohol use: Not Currently    Drug use: Never     Review of Systems   Constitutional:  Negative for fever.   HENT:  Negative for sore throat.    Respiratory:  Negative for shortness of breath.    Cardiovascular:  Positive for chest pain.   Gastrointestinal:  Positive for diarrhea, nausea and vomiting.   Genitourinary:  Negative  for dysuria.   Musculoskeletal:  Negative for back pain.   Skin:  Negative for rash.   Neurological:  Negative for weakness.   Hematological:  Does not bruise/bleed easily.   All other systems reviewed and are negative.    Physical Exam     Initial Vitals [03/20/23 1006]   BP Pulse Resp Temp SpO2   (!) 166/91 81 20 99.3 °F (37.4 °C) 100 %      MAP       --         Physical Exam    Nursing note and vitals reviewed.  Constitutional: She appears well-developed.   HENT:   Head: Normocephalic and atraumatic.   Right Ear: External ear normal.   Left Ear: External ear normal.   Nose: Nose normal.   Mouth/Throat: Oropharynx is clear and moist. No oropharyngeal exudate.   Eyes: Conjunctivae and EOM are normal. Pupils are equal, round, and reactive to light. Right eye exhibits no discharge. Left eye exhibits no discharge.   Neck: Neck supple. No tracheal deviation present. No JVD present.   Normal range of motion.  Cardiovascular:  Normal rate, regular rhythm, normal heart sounds and intact distal pulses.     Exam reveals no gallop and no friction rub.       No murmur heard.  Pulmonary/Chest: Breath sounds normal. No stridor. No respiratory distress. She has no wheezes. She has no rhonchi. She has no rales.   Abdominal: Abdomen is soft. Bowel sounds are normal. She exhibits no distension and no mass. There is no abdominal tenderness. There is no rebound and no guarding.   Musculoskeletal:         General: Normal range of motion.      Cervical back: Normal range of motion and neck supple.     Neurological: She is alert and oriented to person, place, and time. She has normal strength. No cranial nerve deficit.   Skin: Skin is warm and dry. No rash and no abscess noted. No erythema.   Psychiatric: She has a normal mood and affect. Her behavior is normal. Judgment and thought content normal.       ED Course   Procedures  Labs Reviewed   COMPREHENSIVE METABOLIC PANEL - Abnormal; Notable for the following components:       Result  Value    Carbon Dioxide 22 (*)     Glucose Level 228 (*)     Protein Total 8.6 (*)     Globulin 4.2 (*)     Albumin/Globulin Ratio 1.0 (*)     All other components within normal limits   URINALYSIS, REFLEX TO URINE CULTURE - Abnormal; Notable for the following components:    Protein, UA 30 (*)     Glucose,  (*)     Ketones, UA 80 (*)     All other components within normal limits   CBC WITH DIFFERENTIAL - Abnormal; Notable for the following components:    WBC 14.3 (*)     RBC 4.05 (*)     Hgb 11.4 (*)     Hct 32.1 (*)     MCV 79.3 (*)     MPV 10.6 (*)     Lymph # 0.46 (*)     Neut # 13.48 (*)     All other components within normal limits   COVID/FLU A&B PCR - Normal    Narrative:     The Xpert Xpress SARS-CoV-2/FLU/RSV plus is a rapid, multiplexed real-time PCR test intended for the simultaneous qualitative detection and differentiation of SARS-CoV-2, Influenza A, Influenza B, and respiratory syncytial virus (RSV) viral RNA in either nasopharyngeal swab or nasal swab specimens.         LIPASE - Normal   CBC W/ AUTO DIFFERENTIAL    Narrative:     The following orders were created for panel order CBC W/ AUTO DIFFERENTIAL.  Procedure                               Abnormality         Status                     ---------                               -----------         ------                     CBC with Differential[[id number]]        Abnormal            Final result                 Please view results for these tests on the individual orders.   URINALYSIS, MICROSCOPIC     EKG Readings: (Independently Interpreted)   Initial Reading: No STEMI. Rhythm: Normal Sinus Rhythm. Ectopy: No Ectopy. Conduction: Normal. ST Segments: Normal ST Segments. T Waves: Normal. Axis: Normal. Clinical Impression: Normal Sinus Rhythm     Imaging Results              X-Ray Chest 1 View (Final result)  Result time 03/20/23 10:49:25      Final result by Marshall Ramírez MD (03/20/23 10:49:25)                   Impression:      NO ACUTE  CARDIOPULMONARY PROCESS IDENTIFIED.      Electronically signed by: Marshall Ramírez  Date:    03/20/2023  Time:    10:49               Narrative:    EXAMINATION:  XR CHEST 1 VIEW    CLINICAL HISTORY:  Pain, unspecified    TECHNIQUE:  One view    COMPARISON:  September 8, 2021.    FINDINGS:  Cardiopericardial silhouette is within normal limits.  There are right mediastinal metallic clips.  Similar appearance of right basilar pleuroparenchymal thickening or scarring. No acute dense focal or segmental consolidation, congestive process, pleural effusions or pneumothorax.                                       Medications   ondansetron disintegrating tablet 4 mg (4 mg Oral Given 3/20/23 1018)   sodium chloride 0.9% bolus 500 mL 500 mL (0 mLs Intravenous Stopped 3/20/23 1158)     Medical Decision Making:   Differential Diagnosis:   MI COVID flu pneumonia                        Clinical Impression:   Final diagnoses:  [R07.9] Chest pain  [R52] Pain  [A08.4] Viral gastroenteritis (Primary)        ED Disposition Condition    Discharge Stable          ED Prescriptions       Medication Sig Dispense Start Date End Date Auth. Provider    ondansetron (ZOFRAN) 4 MG tablet Take 1 tablet (4 mg total) by mouth every 6 (six) hours. 12 tablet 3/20/2023 -- Naresh Stanley MD          Follow-up Information    None          Naresh Stanley MD  03/20/23 1215       Naresh Stanley MD  03/20/23 1217

## 2023-03-29 ENCOUNTER — OFFICE VISIT (OUTPATIENT)
Dept: FAMILY MEDICINE | Facility: CLINIC | Age: 68
End: 2023-03-29
Payer: MEDICARE

## 2023-03-29 ENCOUNTER — OUTPATIENT CASE MANAGEMENT (OUTPATIENT)
Dept: ADMINISTRATIVE | Facility: OTHER | Age: 68
End: 2023-03-29
Payer: MEDICARE

## 2023-03-29 VITALS
OXYGEN SATURATION: 99 % | SYSTOLIC BLOOD PRESSURE: 133 MMHG | HEART RATE: 65 BPM | WEIGHT: 155.38 LBS | BODY MASS INDEX: 30.35 KG/M2 | RESPIRATION RATE: 18 BRPM | DIASTOLIC BLOOD PRESSURE: 79 MMHG

## 2023-03-29 DIAGNOSIS — J45.998 ASTHMA, PERSISTENT CONTROLLED: ICD-10-CM

## 2023-03-29 DIAGNOSIS — Z00.00 MEDICARE ANNUAL WELLNESS VISIT, SUBSEQUENT: Primary | ICD-10-CM

## 2023-03-29 DIAGNOSIS — Z79.899 DRUG-INDUCED IMMUNODEFICIENCY: ICD-10-CM

## 2023-03-29 DIAGNOSIS — L84 PRE-ULCERATIVE CORN OR CALLOUS: ICD-10-CM

## 2023-03-29 DIAGNOSIS — E11.69 HYPERLIPIDEMIA ASSOCIATED WITH TYPE 2 DIABETES MELLITUS: ICD-10-CM

## 2023-03-29 DIAGNOSIS — D57.3 SICKLE-CELL TRAIT: ICD-10-CM

## 2023-03-29 DIAGNOSIS — I15.2 HYPERTENSION ASSOCIATED WITH DIABETES: ICD-10-CM

## 2023-03-29 DIAGNOSIS — I73.9 PVD (PERIPHERAL VASCULAR DISEASE): ICD-10-CM

## 2023-03-29 DIAGNOSIS — Z12.11 SCREENING FOR COLON CANCER: ICD-10-CM

## 2023-03-29 DIAGNOSIS — E55.9 VITAMIN D DEFICIENCY: ICD-10-CM

## 2023-03-29 DIAGNOSIS — E66.09 CLASS 1 OBESITY DUE TO EXCESS CALORIES WITH SERIOUS COMORBIDITY AND BODY MASS INDEX (BMI) OF 30.0 TO 30.9 IN ADULT: ICD-10-CM

## 2023-03-29 DIAGNOSIS — L30.9 DERMATITIS: ICD-10-CM

## 2023-03-29 DIAGNOSIS — E11.59 HYPERTENSION ASSOCIATED WITH DIABETES: ICD-10-CM

## 2023-03-29 DIAGNOSIS — E78.5 HYPERLIPIDEMIA ASSOCIATED WITH TYPE 2 DIABETES MELLITUS: ICD-10-CM

## 2023-03-29 DIAGNOSIS — K63.5 POLYP OF COLON, UNSPECIFIED PART OF COLON, UNSPECIFIED TYPE: ICD-10-CM

## 2023-03-29 DIAGNOSIS — D84.821 DRUG-INDUCED IMMUNODEFICIENCY: ICD-10-CM

## 2023-03-29 DIAGNOSIS — E11.42 CONTROLLED TYPE 2 DIABETES MELLITUS WITH DIABETIC POLYNEUROPATHY, WITHOUT LONG-TERM CURRENT USE OF INSULIN: ICD-10-CM

## 2023-03-29 PROCEDURE — 99214 OFFICE O/P EST MOD 30 MIN: CPT | Mod: ,,, | Performed by: FAMILY MEDICINE

## 2023-03-29 PROCEDURE — 99214 PR OFFICE/OUTPT VISIT, EST, LEVL IV, 30-39 MIN: ICD-10-PCS | Mod: ,,, | Performed by: FAMILY MEDICINE

## 2023-03-29 RX ORDER — BETAMETHASONE VALERATE 1.2 MG/G
CREAM TOPICAL 2 TIMES DAILY PRN
Qty: 45 G | Refills: 2 | Status: SHIPPED | OUTPATIENT
Start: 2023-03-29 | End: 2023-06-27

## 2023-03-29 RX ORDER — DICLOFENAC SODIUM 50 MG/1
50 TABLET, DELAYED RELEASE ORAL 2 TIMES DAILY PRN
COMMUNITY
Start: 2023-03-03 | End: 2023-05-25

## 2023-03-29 RX ORDER — MELOXICAM 15 MG/1
15 TABLET ORAL DAILY
COMMUNITY
Start: 2022-11-02 | End: 2023-06-27

## 2023-03-29 RX ORDER — SITAGLIPTIN AND METFORMIN HYDROCHLORIDE 500; 50 MG/1; MG/1
1 TABLET, FILM COATED ORAL 2 TIMES DAILY WITH MEALS
Qty: 180 TABLET | Refills: 3 | Status: SHIPPED | OUTPATIENT
Start: 2023-03-29 | End: 2024-03-28

## 2023-03-29 RX ORDER — ERGOCALCIFEROL 1.25 MG/1
50000 CAPSULE ORAL
Qty: 12 CAPSULE | Refills: 3 | Status: SHIPPED | OUTPATIENT
Start: 2023-03-29 | End: 2023-06-27 | Stop reason: SDUPTHER

## 2023-03-29 NOTE — PROGRESS NOTES
04/13/23 No return call received from patient expressing needs. Will close OPCM case. JU Cisneros RN    03/30/23 Patient has received her meds for diabetes (has been without due to computer error she says). Would like to take meds for a week and see what blood sugars do and call me back. JU Cisneros RN    03/29/23 Chart review done. MD jamest today, plan to reach out to patient tomorrow. JU Cisneros RN

## 2023-03-29 NOTE — PROGRESS NOTES
Patient ID: [id number]     Chief Complaint: Medicare AWV        HPI:     Cheryl Solis is a 68 y.o. female here today for a Medicare Wellness.   The patient presents for well adult exam, The patient's general health status is described as good. The patient's diet is described as balanced. Exercise: occasional. Associated symptoms consist of denies weight loss, denies weight gain, denies fatigue, denies headache, denies hearing loss and denies vision changes. Last menstrual period: Menopause x 2002. Additional pertinent history: last dental exam: 2/9/2015 (she reports that she cannot afford dental appointment at this time and she refuses services, risks of refusal discussed with patient. ), last eye exam: 07/23/20 (with Ophthalmology (Dr. Jerome), she would like DM II eye exam today), last pap smear : 2/9/2017 (WNL with neg HPV at [facility]- Dr. Wells), last colonoscopy:2018 (with GI (Dr. Swanson)), she is due for 3 year Colonoscopy, she needs referral, seat belt use, occasional caffeine use, tobacco use none, no alcohol use and She follows up with ENT (Dr. Temple) for thyroid nodules, which have been stable. She does followup with pain management as scheduled for chronic pain. DM II is controlled with Rx, no adverse Rx side effects, she needs a refill of Janumet 50mg/500mg bid, fasting CBGs have been 's, she denies polyuria, denies polydipsia, denies polydipsia. She has pre-ulcerative calloses on both feet, needs Rx for DM II shoes, she does see Podiatry as scheduled. HTN is controlled with Rx, no side effects. Hypercholesterolemia is controlled with Rx, no side effects, asymptomatic. COPD/Asthma is stable with current Rx, asymptomatic, followup by Pulmonary (Dr. Randall). Last MMG: 10/14/2022, WNL, Last DEXA scan: 09/13/2021, WNL, due in 09/2023. She is due for annual labs. She is UTD on vaccines. She does followup with Vascular (Dr. Suh) as scheduled for cardiovascular management/ PAD/PVD, which  are asymptomatic with Rx. She is has a history of mixed connective tissue disorder/drug induced immunodeficiency, stable with Rx, followed by Rheumatology (Dr. Cooper). She is obese, working on losing weight on her own, she is not interested in weight loss Rx or seeing a dietician.   - She complains of dry patch on right anterior leg x 2-3 weeks, associated with pruritus. Denies pain, purulent drainage, or erythema. She denies spread of rash. She hasn't tried any OTC Rx for resolution of symptoms.   - Patient is without any other complaints today.    denies Urinary leakage.  reports Recent falls or balance difficulty, followed by pain management.   admits to Daily exercise or physical activity.  denies Depression, stress, anxiety, or emotional lability.   denies The need for healthcare treatment including a cane/walker, blood pressure monitoring, or regular vision/hearing tests.     A separate E/M code has been provided to evaluate additional complaints that the patient would like addressed during the dedicated Medicare Wellness Exam.    Patient Care Team:  Monica Max MD as PCP - General (Family Medicine)  MD Nilson Cooper MD as Consulting Physician (Gastroenterology)  Valerie Hein OD (Optometry)     Opioid Screening: Patient medication list reviewed, patient is taking prescription opioids. Patient is not using additional opioids than prescribed. Patient is at low risk of substance abuse based on this opioid use history.      Advance Care Planning     Date: 03/29/2023  Patient did not wish or was not able to name a surrogate decision maker or provide an Advance Care Plan.          ----------------------------  Anemia, unspecified  Fibromyalgia  HTN (hypertension)  Mixed hyperlipidemia  Obesity, unspecified  Sickle-cell disease without crisis  Sleep apnea, unspecified  Type 2 diabetes mellitus without complications  Vitamin D deficiency     Past Surgical History:    Procedure Laterality Date    BACK SURGERY      CARPAL TUNNEL RELEASE Bilateral      SECTION       SECTION       SECTION      COLONOSCOPY      COLONOSCOPY W/ POLYPECTOMY  2013    FOOT SURGERY Left     lasar eye       SYMPATHECTOMY         Review of patient's allergies indicates:   Allergen Reactions    Metformin Rash     Rash (skin)^  Rash (skin)^      Morphine Itching     Itchy (skin)^  Itchy (skin)^      Shellfish containing products     Vascepa [icosapent ethyl] Hives    Ibuprofen Rash     Hives (skin)^  Hives (skin)^         Outpatient Medications Marked as Taking for the 3/29/23 encounter (Office Visit) with Monica Max MD   Medication Sig Dispense Refill    cetirizine (ZYRTEC) 10 MG tablet Take 10 mg by mouth every evening.      EPINEPHrine (EPIPEN) 0.3 mg/0.3 mL AtIn Inject 1 each into the muscle daily as needed.      ezetimibe-simvastatin 10-40 mg (VYTORIN) 10-40 mg per tablet Take 1 tablet by mouth once daily.      fluticasone propionate (FLONASE) 50 mcg/actuation nasal spray 2 sprays by Each Nostril route once daily.      hydroCHLOROthiazide (HYDRODIURIL) 25 MG tablet TAKE ONE TABLET BY MOUTH TWICE DAILY 180 tablet 3    HYDROcodone-acetaminophen (NORCO)  mg per tablet Take 1 tablet by mouth every 4 (four) hours.      hydrOXYzine HCL (ATARAX) 25 MG tablet Take 25 mg by mouth every 8 (eight) hours as needed.      montelukast (SINGULAIR) 10 mg tablet Take 10 mg by mouth once daily.      omalizumab (XOLAIR) 150 mg/mL injection Inject 300 mg into the skin every 30 days.      ondansetron (ZOFRAN) 4 MG tablet Take 1 tablet (4 mg total) by mouth every 6 (six) hours. 12 tablet 0    traZODone (DESYREL) 50 MG tablet Take 50 mg by mouth nightly.      [DISCONTINUED] albuterol (PROVENTIL) 2.5 mg /3 mL (0.083 %) nebulizer solution use one vial in nebulizer every 4 to 6 hours as needed for wheezing 75 mL 11    [DISCONTINUED] amlodipine-benazepril 5-10 mg (LOTREL) 5-10 mg per  capsule TAKE ONE CAPSULE BY MOUTH DAILY 90 capsule 3    [DISCONTINUED] aspirin (ECOTRIN) 81 MG EC tablet Take 1 tablet (81 mg total) by mouth after lunch. 30 tablet 5    [DISCONTINUED] diclofenac (VOLTAREN) 50 MG EC tablet Take 50 mg by mouth 2 (two) times daily as needed.      [DISCONTINUED] ferrous gluconate (FERGON) 324 MG tablet TAKE ONE TABLET BY MOUTH THREE TIMES DAILY WITH FOOD 90 tablet 11    [DISCONTINUED] folic acid-vit B6-vit B12 2.5-25-2 mg (FOLBIC OR EQUIV) 2.5-25-2 mg Tab Take 1 tablet by mouth once daily. After breakfast (Patient not taking: Reported on 6/27/2023) 90 tablet 3    [DISCONTINUED] gabapentin (NEURONTIN) 100 MG capsule Take 1 capsule (100 mg total) by mouth 3 (three) times daily. 270 capsule 3    [DISCONTINUED] hydrOXYchloroQUINE (PLAQUENIL) 200 mg tablet Take 1 tablet (200 mg total) by mouth 2 (two) times daily. 180 tablet 3    [DISCONTINUED] icosapent ethyL (VASCEPA) 1 gram Cap Take 2 capsules (2,000 mg total) by mouth 2 (two) times daily. 120 capsule 11    [DISCONTINUED] meloxicam (MOBIC) 15 MG tablet Take 15 mg by mouth once daily.      [DISCONTINUED] mycophenolate (CELLCEPT) 500 mg Tab Take 2 tablets (1,000 mg total) by mouth daily with dinner or evening meal. 180 tablet 3    [DISCONTINUED] omeprazole (PRILOSEC) 40 MG capsule Take 1 capsule (40 mg total) by mouth once daily. 90 capsule 3    [DISCONTINUED] tiZANidine (ZANAFLEX) 4 MG tablet Take 2 tablets (8 mg total) by mouth nightly. 180 tablet 3    [DISCONTINUED] VENTOLIN HFA 90 mcg/actuation inhaler use 2 puffs by mouth every 6 hours as needed for wheezing 18 g 11       Social History     Socioeconomic History    Marital status:    Tobacco Use    Smoking status: Never    Smokeless tobacco: Never   Substance and Sexual Activity    Alcohol use: Not Currently    Drug use: Never    Sexual activity: Yes        Family History   Problem Relation Age of Onset    Cancer Mother     Stroke Sister     Hypertension Sister     Drug abuse  Brother     Alcohol abuse Brother         Subjective:     ROS      See HPI for details    Constitutional: No fever, No chills, No fatigue.   Eye: No blurring, No visual disturbances.   Ear/Nose/Mouth/Throat: No decreased hearing, No ear pain, No nasal congestion, No sore throat.   Respiratory: No shortness of breath, No cough, No wheezing.   Cardiovascular: No chest pain, No palpitations, No peripheral edema.   Breast: Both breasts, No lump/ mass, No pain.   Nipple discharge: None.   Gastrointestinal: No nausea, No vomiting, No diarrhea, No constipation, No abdominal pain.   Genitourinary: No dysuria, No hematuria.   Gynecologic: Negative except as documented in history of present illness.   Hematology/Lymphatics: No bruising tendency, No bleeding tendency, No swollen lymph glands.   Endocrine: No excessive thirst, No polyuria, No excessive hunger.   Musculoskeletal: Joint pain, Muscle pain, No decreased range of motion.   Integumentary: Rash, Pruritus.   Neurologic: No abnormal balance, No confusion, No headache.   Psychiatric: No anxiety, No depression, Not suicidal, No hallucinations.     All Other ROS: Negative except as stated in HPI.       Objective:     /79 (BP Location: Left arm, Patient Position: Sitting, BP Method: Large (Automatic))   Pulse 65   Resp 18   Wt 70.5 kg (155 lb 6.4 oz)   SpO2 99%   BMI 30.35 kg/m²     Physical Exam    General: Alert and oriented, No acute distress.   Appearance: Obese.   Eye: Pupils are equal, round and reactive to light, Extraocular movements are intact, Normal conjunctiva.   HENT: Normocephalic, Tympanic membranes are clear, Normal hearing, Oral mucosa is moist, No pharyngeal erythema.   Throat: Pharynx ( Not edematous, No exudate ).   Neck: Supple, Non-tender, No carotid bruit, No lymphadenopathy, No thyromegaly.   Respiratory: Lungs are clear to auscultation, Respirations are non-labored, Breath sounds are equal, Symmetrical chest wall expansion, No chest wall  tenderness.   Cardiovascular: Normal rate, Regular rhythm, No murmur, Good pulses equal in all extremities, No edema.   Gastrointestinal: Soft, Non-tender, Non-distended, Normal bowel sounds, No organomegaly.   Genitourinary: No costovertebral angle tenderness.   Musculoskeletal: Normal range of motion, Normal gait.   Integumentary: Right anterior leg with 4cm x 4cm dry circular dry patch, no erythema, no purulent drainage, non-tender, consistent with dermatitis.  Neurologic: No focal deficits, Cranial Nerves II-XII are grossly intact.  Protective Sensation (w/ 10 gram monofilament):  Right: Intact  Left: Intact    Visual Inspection:  Callus -  Bilateral    Pedal Pulses:   Right: Present  Left: Present    Posterior Tibialis Pulses:   Right:Present  Left: Present   Psychiatric: Cooperative, Appropriate mood & affect, Normal judgment, Non-suicidal.   Mood and affect: Calm.   Behavior: Relaxed.       Assessment:       ICD-10-CM ICD-9-CM   1. Medicare annual wellness visit, subsequent  Z00.00 V70.0   2. Controlled type 2 diabetes mellitus with diabetic polyneuropathy, without long-term current use of insulin  E11.42 250.60     357.2   3. Pre-ulcerative corn or callous  L84 700   4. Hypertension associated with diabetes  E11.59 250.80    I15.2 401.9   5. Hyperlipidemia associated with type 2 diabetes mellitus  E11.69 250.80    E78.5 272.4   6. Class 1 obesity due to excess calories with serious comorbidity and body mass index (BMI) of 30.0 to 30.9 in adult  E66.09 278.00    Z68.30 V85.30   7. Dermatitis  L30.9 692.9   8. Vitamin D deficiency  E55.9 268.9   9. PVD (peripheral vascular disease)  I73.9 443.9   10. Sickle-cell trait  D57.3 282.5   11. Screening for colon cancer  Z12.11 V76.51   12. Polyp of colon, unspecified part of colon, unspecified type  K63.5 211.3   13. Drug-induced immunodeficiency  D84.821 279.3    Z79.899 E947.9   14. Asthma, persistent controlled  J45.998 493.90        Plan:       Medicare Annual  Wellness and Personalized Prevention Plan:     Fall Risk + Home Safety + Hearing Impairment + Depression Screen + Cognitive Impairment Screen + Health Risk Assessment all reviewed.     No flowsheet data found.  Depression Screeening PHQ2 7/18/2023 6/27/2023 3/29/2023 2/27/2023 10/6/2022 9/28/2022   Over the last two weeks how often have you been bothered by little interest or pleasure in doing things 0 0 1 0 0 0   Over the last two weeks how often have you been bothered by feeling down, depressed or hopeless 0 0 1 0 0 0   PHQ-2 Total Score 0 0 2 0 0 0     Fall Risk Assessment - Outpatient 7/18/2023 6/27/2023 3/29/2023 2/27/2023 10/6/2022 9/28/2022   Mobility Status Ambulatory Ambulatory Ambulatory Ambulatory Ambulatory Ambulatory   Number of falls 0 0 2+ 0 2+ 0   Identified as fall risk 0 0 1 0 1 0                               Alcohol/Tobacco Use - Stressed importance of smoking cessation and limiting alcohol intake.  CVD Risk Factors - Reviewed  Obesity/Physical Activity - Normal BMI. Encouraged daily 30 minute physical activity x 5 days per week.      Health Maintenance Topics with due status: Not Due       Topic Last Completion Date    TETANUS VACCINE 09/15/2017    Influenza Vaccine 11/08/2022    Foot Exam 03/29/2023    Diabetes Urine Screening 06/27/2023    Lipid Panel 06/27/2023    Hemoglobin A1c 06/27/2023    Colorectal Cancer Screening 07/24/2023        Vaccinations -   Immunization History   Administered Date(s) Administered    COVID-19 Vaccine 04/04/2022    COVID-19, MRNA, LN-S, PF (MODERNA FULL 0.5 ML DOSE) 02/09/2021, 03/09/2021, 11/08/2021    DTP 1955, 1955, 06/03/1965, 09/15/1966, 03/01/1977, 11/19/1987    Influenza (FLUBLOK) - Quadrivalent - Recombinant - PF *Preferred* (egg allergy) 01/13/2014, 10/03/2018    Influenza - High Dose - PF (65 years and older) 11/08/2019    Influenza - Quadrivalent - High Dose - PF (65 years and older) 11/06/2020    Influenza - Quadrivalent - PF *Preferred* (6  months and older) 09/15/2017    Influenza - Trivalent - PF (ADULT) 01/13/2014, 09/24/2014, 09/15/2017, 11/08/2019, 11/06/2020, 11/17/2020    OPV 1954, 05/03/1960, 06/02/1960, 06/01/1961, 09/18/1969    Pneumococcal Conjugate - 13 Valent 04/13/2015    Pneumococcal Polysaccharide - 23 Valent 08/20/2019    Tdap 11/20/2014, 09/15/2017, 09/15/2017    Zoster 11/03/2014, 11/28/2018, 02/04/2019    Zoster Recombinant 11/28/2018, 02/04/2019          1. Medicare annual wellness visit, subsequent  - Comprehensive Metabolic Panel; Future  - TSH; Future  - Urinalysis, Reflex to Urine Culture; Future  Will treat pending lab results. Monthly breast self exam encouraged. Diet, exercise, and 10% weight loss encouraged. Keep appointment for dental exams x q6 months as scheduled. Keep appointment for annual eye exam as scheduled. Keep appointment with specialists as scheduled. Notify M.D. or ER if temp greater than 100.4, or any acute illness.      2. Controlled type 2 diabetes mellitus with diabetic polyneuropathy, without long-term current use of insulin  - Rx SITagliptan-metformin (JANUMET)  mg per tablet; Take 1 tablet by mouth 2 (two) times daily with meals.  Dispense: 180 tablet; Refill: 3 refilled today  - Diabetic Eye Screening Photo; Future  - Referral to Ophthalmology for DM II eye exam due to eye camera is out of order  - Hemoglobin A1C; Future  - Microalbumin/Creatinine Ratio, Urine; Future  - Ambulatory referral/consult to Outpatient Case Management  - Will treat pending results. Keep daily fasting CBG log. Keep appointment for annual eye exam as scheduled. Notify M.D. or ER if CBG >400 or <60, polyuria, polydipsia, or polyphagia.   Lab Results   Component Value Date    HGBA1C 5.1 06/27/2023      Continue   On ACE and Statin according to guidelines.  Follow ADA Diet. Avoid soda, simple sweets, and limit rice/pasta/breads/starches.  Maintain healthy weight with goal BMI <30.  Exercise 5 times per week for 30  minutes per day.  Stressed importance of daily foot exams.  Stressed importance of annual dilated eye exam.     3. Pre-ulcerative corn or callous  - DIABETIC SHOES FOR HOME USE  - Continue followup with Podiatry as scheduled.     4. Hypertension associated with diabetes  - CBC Auto Differential; Future  - BP is well controlled. Continue BP Rx as prescribed. Continue followup with Cardiology. Keep daily BP log. Notify M.D. or ER if BP >170/100 or <90/60, chest pain, palpitations, headache, SOB, temp greater than 100.4, or any acute illness.   Continue  Low Sodium Diet (DASH Diet - Less than 2 grams of sodium per day).  Monitor blood pressure daily and log. Report consistent numbers greater than 140/90.  Smoking cessation encouraged to aid in BP reduction.  Maintain healthy weight with goal BMI <30. Exercise 30 minutes per day, 5 days per week.      5. Hyperlipidemia associated with type 2 diabetes mellitus  - CK; Future  - Lipid Panel; Future  - Will titrate Rx pending results. Continue Rx as prescribed.   Continue  Stressed importance of dietary modifications. Follow a low cholesterol, low saturated fat diet with less that 200mg of cholesterol a day.  Avoid fried foods and high saturated fats (high saturated fats less than 7% of calories).  Add Flax Seed/Fish Oil supplements to diet. Increase dietary fiber.  Regular exercise can reduce LDL and raise HDL. Stressed importance of physical activity 5 times per week for 30 minutes per day.      6. Class 1 obesity due to excess calories with serious comorbidity and body mass index (BMI) of 30.0 to 30.9 in adult  Body mass index is 30.35 kg/m².  Goal BMI <30.  Exercise 5 times a week for 30 minutes per day.  Avoid soda, simple sugars, excessive rice, potatoes or bread. Limit fast foods and fried foods.  Choose complex carbs in moderation (example: green vegetables, beans, oatmeal). Eat plenty of fresh fruits and vegetables with lean meats daily.  Do not skip meals. Eat a  balanced portion size.  Avoid fad diets. Consider permanent healthy life style changes.      7. Dermatitis  - Rx trial of betamethasone valerate 0.1% (VALISONE) 0.1 % Crea; Apply topically 2 (two) times daily as needed (rash).  Dispense: 45 g; Refill: 2  - Keep skin moisturized. Will proceed with Dermatology referral if no improvement.     8. Vitamin D deficiency  - Rx ergocalciferol (ERGOCALCIFEROL) 50,000 unit Cap; Take 1 capsule (50,000 Units total) by mouth every 7 days.  Dispense: 12 capsule; Refill: 3 refilled today.     9. PVD (peripheral vascular disease)  - DIABETIC SHOES FOR HOME USE  - Ambulatory referral/consult to Outpatient Case Management  - Asymptomatic, continue current treatment plan, continue followup with Vascular as scheduled.     10. Sickle-cell trait  - Asymptomatic, continue current treatment plan.     11. Screening for colon cancer  - Ambulatory referral/consult to Gastroenterology; Future for 3 year screening Colonoscopy    12. Polyp of colon, unspecified part of colon, unspecified type  - Ambulatory referral/consult to Gastroenterology; Future for 3 year screening Colonoscopy.     13. Drug-induced immunodeficiency  - Asymptomatic, continue current treatment plan, continue followup with Rheumatology as scheduled.     14. Asthma, persistent controlled   - Asymptomatic, continue current treatment plan, continue followup with Pulmonary as scheduled.     Medication List with Changes/Refills   New Medications    BETAMETHASONE VALERATE 0.1% (VALISONE) 0.1 % CREA    Apply topically 2 (two) times daily as needed (rash).       Start Date: 3/29/2023 End Date: 6/27/2023    ERGOCALCIFEROL (ERGOCALCIFEROL) 50,000 UNIT CAP    Take 1 capsule (50,000 Units total) by mouth every 7 days.       Start Date: 3/29/2023 End Date: 6/27/2023    SITAGLIPTAN-METFORMIN (JANUMET)  MG PER TABLET    Take 1 tablet by mouth 2 (two) times daily with meals.       Start Date: 3/29/2023 End Date: 3/28/2024   Current  Medications    ALBUTEROL (PROVENTIL) 2.5 MG /3 ML (0.083 %) NEBULIZER SOLUTION    use one vial in nebulizer every 4 to 6 hours as needed for wheezing       Start Date: 5/24/2022 End Date: 6/1/2023    AMLODIPINE-BENAZEPRIL 5-10 MG (LOTREL) 5-10 MG PER CAPSULE    TAKE ONE CAPSULE BY MOUTH DAILY       Start Date: 5/26/2022 End Date: 6/1/2023    ASPIRIN (ECOTRIN) 81 MG EC TABLET    Take 1 tablet (81 mg total) by mouth after lunch.       Start Date: 10/6/2022 End Date: 5/25/2023    CETIRIZINE (ZYRTEC) 10 MG TABLET    Take 10 mg by mouth every evening.       Start Date: 10/4/2022 End Date: --    DICLOFENAC (VOLTAREN) 50 MG EC TABLET    Take 50 mg by mouth 2 (two) times daily as needed.       Start Date: 3/3/2023  End Date: 5/25/2023    EPINEPHRINE (EPIPEN) 0.3 MG/0.3 ML ATIN    Inject 1 each into the muscle daily as needed.       Start Date: 4/25/2022 End Date: --    EZETIMIBE-SIMVASTATIN 10-40 MG (VYTORIN) 10-40 MG PER TABLET    Take 1 tablet by mouth once daily.       Start Date: 7/30/2021 End Date: --    FERROUS GLUCONATE (FERGON) 324 MG TABLET    TAKE ONE TABLET BY MOUTH THREE TIMES DAILY WITH FOOD       Start Date: 6/6/2022  End Date: 7/31/2023    FLUTICASONE PROPIONATE (FLONASE) 50 MCG/ACTUATION NASAL SPRAY    2 sprays by Each Nostril route once daily.       Start Date: 6/3/2022  End Date: --    FOLIC ACID-VIT B6-VIT B12 2.5-25-2 MG (FOLBIC OR EQUIV) 2.5-25-2 MG TAB    Take 1 tablet by mouth once daily. After breakfast       Start Date: 2/27/2023 End Date: 6/27/2023    GABAPENTIN (NEURONTIN) 100 MG CAPSULE    Take 1 capsule (100 mg total) by mouth 3 (three) times daily.       Start Date: 2/27/2023 End Date: 6/27/2023    HYDROCHLOROTHIAZIDE (HYDRODIURIL) 25 MG TABLET    TAKE ONE TABLET BY MOUTH TWICE DAILY       Start Date: 6/13/2022 End Date: --    HYDROCODONE-ACETAMINOPHEN (NORCO)  MG PER TABLET    Take 1 tablet by mouth every 4 (four) hours.       Start Date: 6/3/2022  End Date: --    HYDROXYCHLOROQUINE  (PLAQUENIL) 200 MG TABLET    Take 1 tablet (200 mg total) by mouth 2 (two) times daily.       Start Date: 2/27/2023 End Date: 6/27/2023    HYDROXYZINE HCL (ATARAX) 25 MG TABLET    Take 25 mg by mouth every 8 (eight) hours as needed.       Start Date: 5/19/2022 End Date: --    ICOSAPENT ETHYL (VASCEPA) 1 GRAM CAP    Take 2 capsules (2,000 mg total) by mouth 2 (two) times daily.       Start Date: 5/10/2022 End Date: 6/1/2023    MELOXICAM (MOBIC) 15 MG TABLET    Take 15 mg by mouth once daily.       Start Date: 11/2/2022 End Date: 6/27/2023    MONTELUKAST (SINGULAIR) 10 MG TABLET    Take 10 mg by mouth once daily.       Start Date: 6/22/2021 End Date: --    MYCOPHENOLATE (CELLCEPT) 500 MG TAB    Take 2 tablets (1,000 mg total) by mouth daily with dinner or evening meal.       Start Date: 2/27/2023 End Date: 6/27/2023    OMALIZUMAB (XOLAIR) 150 MG/ML INJECTION    Inject 300 mg into the skin every 30 days.       Start Date: 9/14/2021 End Date: --    OMEPRAZOLE (PRILOSEC) 40 MG CAPSULE    Take 1 capsule (40 mg total) by mouth once daily.       Start Date: 2/27/2023 End Date: 6/27/2023    ONDANSETRON (ZOFRAN) 4 MG TABLET    Take 1 tablet (4 mg total) by mouth every 6 (six) hours.       Start Date: 3/20/2023 End Date: --    TIZANIDINE (ZANAFLEX) 4 MG TABLET    Take 2 tablets (8 mg total) by mouth nightly.       Start Date: 2/27/2023 End Date: 6/27/2023    TRAZODONE (DESYREL) 50 MG TABLET    Take 50 mg by mouth nightly.       Start Date: 6/3/2022  End Date: --    VENTOLIN HFA 90 MCG/ACTUATION INHALER    use 2 puffs by mouth every 6 hours as needed for wheezing       Start Date: 5/24/2022 End Date: 7/31/2023          The patient's Health Maintenance was reviewed and the following appears to be due at this time:   Health Maintenance Due   Topic Date Due    Eye Exam  02/02/2023    DEXA Scan  09/13/2023    Mammogram  10/14/2023         Follow up in about 6 months (around 9/29/2023) for Cholesterol Followup, HTN Followup,  Diabetes Followup- 30 minute appointment.

## 2023-05-25 DIAGNOSIS — M79.7 FIBROMYALGIA SYNDROME: Primary | ICD-10-CM

## 2023-05-25 DIAGNOSIS — M79.7 FIBROMYALGIA: ICD-10-CM

## 2023-05-25 DIAGNOSIS — M35.1 OTHER OVERLAP SYNDROMES: ICD-10-CM

## 2023-05-25 RX ORDER — DICLOFENAC SODIUM 50 MG/1
TABLET, DELAYED RELEASE ORAL
Qty: 60 TABLET | Refills: 5 | Status: SHIPPED | OUTPATIENT
Start: 2023-05-25 | End: 2023-06-27 | Stop reason: SDUPTHER

## 2023-05-25 RX ORDER — ASPIRIN 81 MG/1
TABLET ORAL
Qty: 30 TABLET | Refills: 5 | Status: SHIPPED | OUTPATIENT
Start: 2023-05-25

## 2023-06-01 DIAGNOSIS — J45.998 OTHER ASTHMA: ICD-10-CM

## 2023-06-01 RX ORDER — ALBUTEROL SULFATE 0.83 MG/ML
SOLUTION RESPIRATORY (INHALATION)
Qty: 75 ML | Refills: 11 | Status: SHIPPED | OUTPATIENT
Start: 2023-06-01

## 2023-06-01 RX ORDER — ICOSAPENT ETHYL 1000 MG/1
CAPSULE ORAL
Qty: 120 CAPSULE | Refills: 11 | Status: SHIPPED | OUTPATIENT
Start: 2023-06-01 | End: 2023-07-18

## 2023-06-01 RX ORDER — AMLODIPINE AND BENAZEPRIL HYDROCHLORIDE 5; 10 MG/1; MG/1
CAPSULE ORAL
Qty: 90 CAPSULE | Refills: 11 | Status: SHIPPED | OUTPATIENT
Start: 2023-06-01

## 2023-06-27 ENCOUNTER — OFFICE VISIT (OUTPATIENT)
Dept: RHEUMATOLOGY | Facility: CLINIC | Age: 68
End: 2023-06-27
Payer: MEDICARE

## 2023-06-27 VITALS
HEIGHT: 60 IN | SYSTOLIC BLOOD PRESSURE: 138 MMHG | HEART RATE: 77 BPM | OXYGEN SATURATION: 98 % | BODY MASS INDEX: 30.11 KG/M2 | WEIGHT: 153.38 LBS | DIASTOLIC BLOOD PRESSURE: 89 MMHG | TEMPERATURE: 99 F

## 2023-06-27 DIAGNOSIS — G62.9 NEUROPATHY: ICD-10-CM

## 2023-06-27 DIAGNOSIS — M79.7 FIBROMYALGIA: ICD-10-CM

## 2023-06-27 DIAGNOSIS — M48.061 DEGENERATIVE LUMBAR SPINAL STENOSIS: Primary | ICD-10-CM

## 2023-06-27 DIAGNOSIS — I73.9 PERIPHERAL VASCULAR DISEASE, UNSPECIFIED: ICD-10-CM

## 2023-06-27 DIAGNOSIS — Z98.890 S/P DISCECTOMY FOR HERNIATED NUCLEUS PULPOSUS: ICD-10-CM

## 2023-06-27 DIAGNOSIS — F51.01 PRIMARY INSOMNIA: ICD-10-CM

## 2023-06-27 DIAGNOSIS — M35.1 OTHER OVERLAP SYNDROMES: ICD-10-CM

## 2023-06-27 DIAGNOSIS — M35.1 MCTD (MIXED CONNECTIVE TISSUE DISEASE): ICD-10-CM

## 2023-06-27 DIAGNOSIS — N28.9 RENAL INSUFFICIENCY: ICD-10-CM

## 2023-06-27 DIAGNOSIS — M79.7 FIBROMYALGIA SYNDROME: ICD-10-CM

## 2023-06-27 DIAGNOSIS — Z87.39 S/P DISCECTOMY FOR HERNIATED NUCLEUS PULPOSUS: ICD-10-CM

## 2023-06-27 DIAGNOSIS — G47.00 INSOMNIA, UNSPECIFIED TYPE: ICD-10-CM

## 2023-06-27 DIAGNOSIS — G43.109 MIGRAINE WITH AURA AND WITHOUT STATUS MIGRAINOSUS, NOT INTRACTABLE: ICD-10-CM

## 2023-06-27 DIAGNOSIS — I10 PRIMARY HYPERTENSION: ICD-10-CM

## 2023-06-27 DIAGNOSIS — D57.3 SICKLE CELL TRAIT: ICD-10-CM

## 2023-06-27 DIAGNOSIS — D57.3 SICKLE-CELL TRAIT: ICD-10-CM

## 2023-06-27 DIAGNOSIS — E55.9 VITAMIN D DEFICIENCY: ICD-10-CM

## 2023-06-27 PROCEDURE — 99214 OFFICE O/P EST MOD 30 MIN: CPT | Mod: S$PBB,,, | Performed by: INTERNAL MEDICINE

## 2023-06-27 PROCEDURE — 99214 PR OFFICE/OUTPT VISIT, EST, LEVL IV, 30-39 MIN: ICD-10-PCS | Mod: S$PBB,,, | Performed by: INTERNAL MEDICINE

## 2023-06-27 PROCEDURE — 99214 OFFICE O/P EST MOD 30 MIN: CPT | Mod: PBBFAC | Performed by: INTERNAL MEDICINE

## 2023-06-27 PROCEDURE — 99999 PR PBB SHADOW E&M-EST. PATIENT-LVL IV: ICD-10-PCS | Mod: PBBFAC,,, | Performed by: INTERNAL MEDICINE

## 2023-06-27 PROCEDURE — 99999 PR PBB SHADOW E&M-EST. PATIENT-LVL IV: CPT | Mod: PBBFAC,,, | Performed by: INTERNAL MEDICINE

## 2023-06-27 RX ORDER — OMEPRAZOLE 40 MG/1
40 CAPSULE, DELAYED RELEASE ORAL DAILY
Qty: 90 CAPSULE | Refills: 3 | Status: SHIPPED | OUTPATIENT
Start: 2023-06-27 | End: 2024-06-26

## 2023-06-27 RX ORDER — MYCOPHENOLATE MOFETIL 500 MG/1
1000 TABLET ORAL
Qty: 180 TABLET | Refills: 3 | Status: SHIPPED | OUTPATIENT
Start: 2023-06-27 | End: 2024-06-26

## 2023-06-27 RX ORDER — DICLOFENAC SODIUM 50 MG/1
TABLET, DELAYED RELEASE ORAL
Qty: 60 TABLET | Refills: 5 | Status: SHIPPED | OUTPATIENT
Start: 2023-06-27

## 2023-06-27 RX ORDER — GABAPENTIN 100 MG/1
100 CAPSULE ORAL 3 TIMES DAILY
Qty: 270 CAPSULE | Refills: 3 | Status: SHIPPED | OUTPATIENT
Start: 2023-06-27 | End: 2024-06-26

## 2023-06-27 RX ORDER — TIZANIDINE 4 MG/1
8 TABLET ORAL NIGHTLY
Qty: 180 TABLET | Refills: 3 | Status: SHIPPED | OUTPATIENT
Start: 2023-06-27

## 2023-06-27 RX ORDER — ERGOCALCIFEROL 1.25 MG/1
50000 CAPSULE ORAL
Qty: 12 CAPSULE | Refills: 3 | Status: SHIPPED | OUTPATIENT
Start: 2023-06-27 | End: 2024-06-26

## 2023-06-27 RX ORDER — HYDROXYCHLOROQUINE SULFATE 200 MG/1
200 TABLET, FILM COATED ORAL 2 TIMES DAILY
Qty: 180 TABLET | Refills: 3 | Status: SHIPPED | OUTPATIENT
Start: 2023-06-27

## 2023-06-27 NOTE — PROGRESS NOTES
Subjective:       Patient ID: Cheryl Solis is a 68 y.o. female.    Chief Complaint: Follow-up (FOLLOW UP. PATIENT COMPLAINS OF PAIN ON RIGHT SIDE OF BODY FROM NECK DOWN TO THE RIGHT HIP. PAIN 7/10.)    The patient is complaining of joint pain involving the MCP PIP wrist elbow shoulders hips knees and ankles bilaterally.  The pain is 3/10 in intensity dull in quality and continuous.  That is associated with a morning stiffness lasting for more than 60 minutes.  Is also having difficulty maintaining a good night of sleep.  This has been associated with myalgias.  Muscle aches are 3/10 in intensity dull in quality and continuous.  They are associated with fatigue.  No fever no chills no others.  Labs checked during this visit       Review of Systems   Constitutional:  Negative for appetite change, chills and fever.   HENT:  Negative for congestion, ear pain, mouth sores, nosebleeds and trouble swallowing.    Eyes:  Negative for photophobia and discharge.   Respiratory:  Negative for chest tightness and shortness of breath.    Cardiovascular:  Negative for chest pain.   Gastrointestinal:  Negative for abdominal pain and vomiting.   Endocrine: Negative.    Genitourinary:  Negative for hematuria.   Musculoskeletal:         As per HPI   Skin:  Negative for rash.   Neurological:  Negative for weakness.       Objective:   /89 (BP Location: Left arm, Patient Position: Sitting, BP Method: Medium (Automatic))   Pulse 77   Temp 98.6 °F (37 °C) (Oral)   Ht 5' (1.524 m)   Wt 69.6 kg (153 lb 6.4 oz)   SpO2 98%   BMI 29.96 kg/m²      Physical Exam   Constitutional: She is oriented to person, place, and time. She appears well-developed and well-nourished. No distress.   HENT:   Head: Normocephalic and atraumatic.   Right Ear: External ear normal.   Left Ear: External ear normal.   Eyes: Pupils are equal, round, and reactive to light.   Cardiovascular: Normal rate, regular rhythm and normal heart sounds.   Pulmonary/Chest:  Breath sounds normal.   Abdominal: Soft. There is no abdominal tenderness.   Musculoskeletal:      Right shoulder: Tenderness present.      Left shoulder: Tenderness present.      Right elbow: Tenderness present.      Left elbow: Tenderness present.      Right wrist: Tenderness present.      Left wrist: Tenderness present.      Cervical back: Neck supple.      Right hip: Tenderness present.      Left hip: Tenderness present.      Right knee: Tenderness present.      Left knee: Tenderness present.      Right ankle: Tenderness present.      Left ankle: Tenderness present.   Lymphadenopathy:     She has no cervical adenopathy.   Neurological: She is alert and oriented to person, place, and time. She displays normal reflexes. No cranial nerve deficit or sensory deficit. She exhibits normal muscle tone. Coordination normal.   Skin: No rash noted. No erythema.   Vitals reviewed.      Right Side Rheumatological Exam     The patient is tender to palpation of the shoulder, elbow, wrist, knee, 1st PIP, 1st MCP, 2nd PIP, 2nd MCP, 3rd PIP, 3rd MCP, 4th PIP, 4th MCP, 5th PIP, hip, ankle, 1st MTP, 2nd MTP, 3rd MTP, 4th MTP, 5th MTP, 1st toe IP, 2nd toe IP, 3rd toe IP, 4th toe IP and 5th toe IP    Left Side Rheumatological Exam     The patient is tender to palpation of the shoulder, elbow, wrist, knee, 1st PIP, 1st MCP, 2nd PIP, 2nd MCP, 3rd PIP, 3rd MCP, 4th PIP, 4th MCP, 5th PIP, 5th MCP, hip, ankle, 1st MTP, 2nd MTP, 3rd MTP, 4th MTP, 5th MTP, 1st toe IP, 2nd toe IP, 3rd toe IP, 4th toe IP and 5th toe IP.       Completed Fibromyalgia exam 18/18 tender points.  No data to display     Assessment:       1. Degenerative lumbar spinal stenosis    2. Migraine with aura and without status migrainosus, not intractable    3. Neuropathy    4. Peripheral vascular disease, unspecified    5. Primary hypertension    6. Renal insufficiency    7. MCTD (mixed connective tissue disease)    8. Sickle-cell trait    9. Vitamin D deficiency    10.  Fibromyalgia syndrome    11. Insomnia, unspecified type    12. Other overlap syndromes    13. Fibromyalgia    14. Sickle cell trait    15. Primary insomnia    16. S/P discectomy for herniated nucleus pulposus          Medication List with Changes/Refills   New Medications    OMEPRAZOLE (PRILOSEC) 40 MG CAPSULE    Take 1 capsule (40 mg total) by mouth once daily. Before lunch       Start Date: 6/27/2023 End Date: 6/26/2024   Current Medications    ALBUTEROL (PROVENTIL) 2.5 MG /3 ML (0.083 %) NEBULIZER SOLUTION    use one vial in nebulizer every 4 to 6 hours as needed for wheezing       Start Date: 6/1/2023  End Date: --    AMLODIPINE-BENAZEPRIL 5-10 MG (LOTREL) 5-10 MG PER CAPSULE    TAKE ONE CAPSULE BY MOUTH DAILY       Start Date: 6/1/2023  End Date: --    ASPIRIN (ECOTRIN) 81 MG EC TABLET    TAKE ONE TABLET BY MOUTH DAILY       Start Date: 5/25/2023 End Date: --    BETAMETHASONE VALERATE 0.1% (VALISONE) 0.1 % CREA    Apply topically 2 (two) times daily as needed (rash).       Start Date: 3/29/2023 End Date: 6/27/2023    CETIRIZINE (ZYRTEC) 10 MG TABLET    Take 10 mg by mouth every evening.       Start Date: 10/4/2022 End Date: --    EPINEPHRINE (EPIPEN) 0.3 MG/0.3 ML ATIN    Inject 1 each into the muscle daily as needed.       Start Date: 4/25/2022 End Date: --    EZETIMIBE-SIMVASTATIN 10-40 MG (VYTORIN) 10-40 MG PER TABLET    Take 1 tablet by mouth once daily.       Start Date: 7/30/2021 End Date: --    FERROUS GLUCONATE (FERGON) 324 MG TABLET    TAKE ONE TABLET BY MOUTH THREE TIMES DAILY WITH FOOD       Start Date: 6/6/2022  End Date: --    FLUTICASONE PROPIONATE (FLONASE) 50 MCG/ACTUATION NASAL SPRAY    2 sprays by Each Nostril route once daily.       Start Date: 6/3/2022  End Date: --    HYDROCHLOROTHIAZIDE (HYDRODIURIL) 25 MG TABLET    TAKE ONE TABLET BY MOUTH TWICE DAILY       Start Date: 6/13/2022 End Date: --    HYDROCODONE-ACETAMINOPHEN (NORCO)  MG PER TABLET    Take 1 tablet by mouth every 4 (four)  hours.       Start Date: 6/3/2022  End Date: --    HYDROXYZINE HCL (ATARAX) 25 MG TABLET    Take 25 mg by mouth every 8 (eight) hours as needed.       Start Date: 5/19/2022 End Date: --    MONTELUKAST (SINGULAIR) 10 MG TABLET    Take 10 mg by mouth once daily.       Start Date: 6/22/2021 End Date: --    OMALIZUMAB (XOLAIR) 150 MG/ML INJECTION    Inject 300 mg into the skin every 30 days.       Start Date: 9/14/2021 End Date: --    ONDANSETRON (ZOFRAN) 4 MG TABLET    Take 1 tablet (4 mg total) by mouth every 6 (six) hours.       Start Date: 3/20/2023 End Date: --    SITAGLIPTAN-METFORMIN (JANUMET)  MG PER TABLET    Take 1 tablet by mouth 2 (two) times daily with meals.       Start Date: 3/29/2023 End Date: 3/28/2024    TRAZODONE (DESYREL) 50 MG TABLET    Take 50 mg by mouth nightly.       Start Date: 6/3/2022  End Date: --    VASCEPA 1 GRAM CAP    TAKE TWO CAPSULES BY MOUTH TWICE DAILY       Start Date: 6/1/2023  End Date: --    VENTOLIN HFA 90 MCG/ACTUATION INHALER    use 2 puffs by mouth every 6 hours as needed for wheezing       Start Date: 5/24/2022 End Date: --   Changed and/or Refilled Medications    Modified Medication Previous Medication    DICLOFENAC (VOLTAREN) 50 MG EC TABLET diclofenac (VOLTAREN) 50 MG EC tablet       TAKE ONE TABLET BY MOUTH TWICE DAILY AS NEEDED FOR PAIN TAKE AFTER FOOD    TAKE ONE TABLET BY MOUTH TWICE DAILY AS NEEDED FOR PAIN TAKE AFTER FOOD       Start Date: 6/27/2023 End Date: --    Start Date: 5/25/2023 End Date: 6/27/2023    ERGOCALCIFEROL (ERGOCALCIFEROL) 50,000 UNIT CAP ergocalciferol (ERGOCALCIFEROL) 50,000 unit Cap       Take 1 capsule (50,000 Units total) by mouth every 7 days.    Take 1 capsule (50,000 Units total) by mouth every 7 days.       Start Date: 6/27/2023 End Date: 6/26/2024    Start Date: 3/29/2023 End Date: 6/27/2023    GABAPENTIN (NEURONTIN) 100 MG CAPSULE gabapentin (NEURONTIN) 100 MG capsule       Take 1 capsule (100 mg total) by mouth 3 (three) times  daily.    Take 1 capsule (100 mg total) by mouth 3 (three) times daily.       Start Date: 6/27/2023 End Date: 6/26/2024    Start Date: 2/27/2023 End Date: 6/27/2023    HYDROXYCHLOROQUINE (PLAQUENIL) 200 MG TABLET hydrOXYchloroQUINE (PLAQUENIL) 200 mg tablet       Take 1 tablet (200 mg total) by mouth 2 (two) times daily.    Take 1 tablet (200 mg total) by mouth 2 (two) times daily.       Start Date: 6/27/2023 End Date: --    Start Date: 2/27/2023 End Date: 6/27/2023    MYCOPHENOLATE (CELLCEPT) 500 MG TAB mycophenolate (CELLCEPT) 500 mg Tab       Take 2 tablets (1,000 mg total) by mouth daily with dinner or evening meal.    Take 2 tablets (1,000 mg total) by mouth daily with dinner or evening meal.       Start Date: 6/27/2023 End Date: 6/26/2024    Start Date: 2/27/2023 End Date: 6/27/2023    TIZANIDINE (ZANAFLEX) 4 MG TABLET tiZANidine (ZANAFLEX) 4 MG tablet       Take 2 tablets (8 mg total) by mouth nightly.    Take 2 tablets (8 mg total) by mouth nightly.       Start Date: 6/27/2023 End Date: --    Start Date: 2/27/2023 End Date: 6/27/2023   Discontinued Medications    FOLIC ACID-VIT B6-VIT B12 2.5-25-2 MG (FOLBIC OR EQUIV) 2.5-25-2 MG TAB    Take 1 tablet by mouth once daily. After breakfast       Start Date: 2/27/2023 End Date: 6/27/2023    MELOXICAM (MOBIC) 15 MG TABLET    Take 15 mg by mouth once daily.       Start Date: 11/2/2022 End Date: 6/27/2023    OMEPRAZOLE (PRILOSEC) 40 MG CAPSULE    Take 1 capsule (40 mg total) by mouth once daily.       Start Date: 2/27/2023 End Date: 6/27/2023         Plan:         Problem List Items Addressed This Visit          Neuro    Degenerative lumbar spinal stenosis - Primary    Relevant Medications    diclofenac (VOLTAREN) 50 MG EC tablet    ergocalciferol (ERGOCALCIFEROL) 50,000 unit Cap    gabapentin (NEURONTIN) 100 MG capsule    hydrOXYchloroQUINE (PLAQUENIL) 200 mg tablet    mycophenolate (CELLCEPT) 500 mg Tab    tiZANidine (ZANAFLEX) 4 MG tablet    omeprazole  (PRILOSEC) 40 MG capsule    Migraine headache    Relevant Medications    diclofenac (VOLTAREN) 50 MG EC tablet    ergocalciferol (ERGOCALCIFEROL) 50,000 unit Cap    gabapentin (NEURONTIN) 100 MG capsule    hydrOXYchloroQUINE (PLAQUENIL) 200 mg tablet    mycophenolate (CELLCEPT) 500 mg Tab    tiZANidine (ZANAFLEX) 4 MG tablet    omeprazole (PRILOSEC) 40 MG capsule    Neuropathy    Relevant Medications    diclofenac (VOLTAREN) 50 MG EC tablet    ergocalciferol (ERGOCALCIFEROL) 50,000 unit Cap    gabapentin (NEURONTIN) 100 MG capsule    hydrOXYchloroQUINE (PLAQUENIL) 200 mg tablet    mycophenolate (CELLCEPT) 500 mg Tab    tiZANidine (ZANAFLEX) 4 MG tablet    omeprazole (PRILOSEC) 40 MG capsule       Cardiac/Vascular    Peripheral vascular disease, unspecified    Relevant Medications    diclofenac (VOLTAREN) 50 MG EC tablet    ergocalciferol (ERGOCALCIFEROL) 50,000 unit Cap    gabapentin (NEURONTIN) 100 MG capsule    hydrOXYchloroQUINE (PLAQUENIL) 200 mg tablet    mycophenolate (CELLCEPT) 500 mg Tab    tiZANidine (ZANAFLEX) 4 MG tablet    omeprazole (PRILOSEC) 40 MG capsule    Primary hypertension    Relevant Medications    diclofenac (VOLTAREN) 50 MG EC tablet    ergocalciferol (ERGOCALCIFEROL) 50,000 unit Cap    gabapentin (NEURONTIN) 100 MG capsule    hydrOXYchloroQUINE (PLAQUENIL) 200 mg tablet    mycophenolate (CELLCEPT) 500 mg Tab    tiZANidine (ZANAFLEX) 4 MG tablet    omeprazole (PRILOSEC) 40 MG capsule       Renal/    Renal insufficiency    Relevant Medications    diclofenac (VOLTAREN) 50 MG EC tablet    ergocalciferol (ERGOCALCIFEROL) 50,000 unit Cap    gabapentin (NEURONTIN) 100 MG capsule    hydrOXYchloroQUINE (PLAQUENIL) 200 mg tablet    mycophenolate (CELLCEPT) 500 mg Tab    tiZANidine (ZANAFLEX) 4 MG tablet    omeprazole (PRILOSEC) 40 MG capsule       Immunology/Multi System    MCTD (mixed connective tissue disease)    Relevant Medications    diclofenac (VOLTAREN) 50 MG EC tablet    ergocalciferol  (ERGOCALCIFEROL) 50,000 unit Cap    gabapentin (NEURONTIN) 100 MG capsule    hydrOXYchloroQUINE (PLAQUENIL) 200 mg tablet    mycophenolate (CELLCEPT) 500 mg Tab    tiZANidine (ZANAFLEX) 4 MG tablet    omeprazole (PRILOSEC) 40 MG capsule       Oncology    Sickle-cell trait    Relevant Medications    diclofenac (VOLTAREN) 50 MG EC tablet    ergocalciferol (ERGOCALCIFEROL) 50,000 unit Cap    gabapentin (NEURONTIN) 100 MG capsule    hydrOXYchloroQUINE (PLAQUENIL) 200 mg tablet    mycophenolate (CELLCEPT) 500 mg Tab    tiZANidine (ZANAFLEX) 4 MG tablet    omeprazole (PRILOSEC) 40 MG capsule    Sickle cell trait    Relevant Medications    diclofenac (VOLTAREN) 50 MG EC tablet    ergocalciferol (ERGOCALCIFEROL) 50,000 unit Cap    gabapentin (NEURONTIN) 100 MG capsule    hydrOXYchloroQUINE (PLAQUENIL) 200 mg tablet    mycophenolate (CELLCEPT) 500 mg Tab    tiZANidine (ZANAFLEX) 4 MG tablet    omeprazole (PRILOSEC) 40 MG capsule       Endocrine    Vitamin D deficiency    Relevant Medications    diclofenac (VOLTAREN) 50 MG EC tablet    ergocalciferol (ERGOCALCIFEROL) 50,000 unit Cap    gabapentin (NEURONTIN) 100 MG capsule    hydrOXYchloroQUINE (PLAQUENIL) 200 mg tablet    mycophenolate (CELLCEPT) 500 mg Tab    tiZANidine (ZANAFLEX) 4 MG tablet    omeprazole (PRILOSEC) 40 MG capsule       Orthopedic    Fibromyalgia syndrome    Relevant Medications    diclofenac (VOLTAREN) 50 MG EC tablet    ergocalciferol (ERGOCALCIFEROL) 50,000 unit Cap    gabapentin (NEURONTIN) 100 MG capsule    hydrOXYchloroQUINE (PLAQUENIL) 200 mg tablet    mycophenolate (CELLCEPT) 500 mg Tab    tiZANidine (ZANAFLEX) 4 MG tablet    omeprazole (PRILOSEC) 40 MG capsule       Other    Insomnia    Relevant Medications    diclofenac (VOLTAREN) 50 MG EC tablet    ergocalciferol (ERGOCALCIFEROL) 50,000 unit Cap    gabapentin (NEURONTIN) 100 MG capsule    hydrOXYchloroQUINE (PLAQUENIL) 200 mg tablet    mycophenolate (CELLCEPT) 500 mg Tab    tiZANidine (ZANAFLEX)  4 MG tablet    omeprazole (PRILOSEC) 40 MG capsule     Other Visit Diagnoses       Other overlap syndromes        Relevant Medications    diclofenac (VOLTAREN) 50 MG EC tablet    ergocalciferol (ERGOCALCIFEROL) 50,000 unit Cap    gabapentin (NEURONTIN) 100 MG capsule    hydrOXYchloroQUINE (PLAQUENIL) 200 mg tablet    mycophenolate (CELLCEPT) 500 mg Tab    tiZANidine (ZANAFLEX) 4 MG tablet    omeprazole (PRILOSEC) 40 MG capsule    Fibromyalgia        Relevant Medications    diclofenac (VOLTAREN) 50 MG EC tablet    ergocalciferol (ERGOCALCIFEROL) 50,000 unit Cap    gabapentin (NEURONTIN) 100 MG capsule    hydrOXYchloroQUINE (PLAQUENIL) 200 mg tablet    mycophenolate (CELLCEPT) 500 mg Tab    tiZANidine (ZANAFLEX) 4 MG tablet    omeprazole (PRILOSEC) 40 MG capsule    S/P discectomy for herniated nucleus pulposus        Relevant Medications    diclofenac (VOLTAREN) 50 MG EC tablet    ergocalciferol (ERGOCALCIFEROL) 50,000 unit Cap    gabapentin (NEURONTIN) 100 MG capsule    hydrOXYchloroQUINE (PLAQUENIL) 200 mg tablet    mycophenolate (CELLCEPT) 500 mg Tab    tiZANidine (ZANAFLEX) 4 MG tablet    omeprazole (PRILOSEC) 40 MG capsule

## 2023-07-18 ENCOUNTER — OFFICE VISIT (OUTPATIENT)
Dept: FAMILY MEDICINE | Facility: CLINIC | Age: 68
End: 2023-07-18
Payer: MEDICARE

## 2023-07-18 VITALS
WEIGHT: 154.31 LBS | RESPIRATION RATE: 17 BRPM | OXYGEN SATURATION: 99 % | DIASTOLIC BLOOD PRESSURE: 75 MMHG | SYSTOLIC BLOOD PRESSURE: 118 MMHG | HEART RATE: 64 BPM | BODY MASS INDEX: 30.13 KG/M2

## 2023-07-18 DIAGNOSIS — E78.5 HYPERLIPIDEMIA ASSOCIATED WITH TYPE 2 DIABETES MELLITUS: Primary | ICD-10-CM

## 2023-07-18 DIAGNOSIS — E11.69 HYPERLIPIDEMIA ASSOCIATED WITH TYPE 2 DIABETES MELLITUS: Primary | ICD-10-CM

## 2023-07-18 PROCEDURE — 99213 PR OFFICE/OUTPT VISIT, EST, LEVL III, 20-29 MIN: ICD-10-PCS | Mod: ,,, | Performed by: FAMILY MEDICINE

## 2023-07-18 PROCEDURE — 99213 OFFICE O/P EST LOW 20 MIN: CPT | Mod: ,,, | Performed by: FAMILY MEDICINE

## 2023-07-18 NOTE — PROGRESS NOTES
Patient ID: [id number]     Chief Complaint: Medication Problem (Vascepa)        HPI:     Cheryl Solis is a 68 y.o. female here today for a follow up HLD. Reports hives with Vascepa because is is allergic to fish. She is doing well with Vytorin, no side effects. She does see Cardiology (Dr. Suh) as scheduled for PVD. She had labs on 2023, here to discuss the results today. She denies chest pain, palpitations, SOB, or edema.   - Patient is without any other complains today.         ----------------------------  Anemia, unspecified  Fibromyalgia  HTN (hypertension)  Mixed hyperlipidemia  Obesity, unspecified  Sickle-cell disease without crisis  Sleep apnea, unspecified  Type 2 diabetes mellitus without complications  Vitamin D deficiency     Past Surgical History:   Procedure Laterality Date    BACK SURGERY      CARPAL TUNNEL RELEASE Bilateral      SECTION       SECTION       SECTION      COLONOSCOPY      COLONOSCOPY W/ POLYPECTOMY  2013    FOOT SURGERY Left     lasar eye       SYMPATHECTOMY         Review of patient's allergies indicates:   Allergen Reactions    Metformin Rash     Rash (skin)^  Rash (skin)^      Morphine Itching     Itchy (skin)^  Itchy (skin)^      Shellfish containing products     Vascepa [icosapent ethyl] Hives    Ibuprofen Rash     Hives (skin)^  Hives (skin)^         Outpatient Medications Marked as Taking for the 23 encounter (Office Visit) with Monica Max MD   Medication Sig Dispense Refill    albuterol (PROVENTIL) 2.5 mg /3 mL (0.083 %) nebulizer solution use one vial in nebulizer every 4 to 6 hours as needed for wheezing 75 mL 11    amlodipine-benazepril 5-10 mg (LOTREL) 5-10 mg per capsule TAKE ONE CAPSULE BY MOUTH DAILY 90 capsule 11    aspirin (ECOTRIN) 81 MG EC tablet TAKE ONE TABLET BY MOUTH DAILY 30 tablet 5    cetirizine (ZYRTEC) 10 MG tablet Take 10 mg by mouth every evening.      diclofenac (VOLTAREN) 50 MG EC tablet TAKE ONE  TABLET BY MOUTH TWICE DAILY AS NEEDED FOR PAIN TAKE AFTER FOOD 60 tablet 5    EPINEPHrine (EPIPEN) 0.3 mg/0.3 mL AtIn Inject 1 each into the muscle daily as needed.      ergocalciferol (ERGOCALCIFEROL) 50,000 unit Cap Take 1 capsule (50,000 Units total) by mouth every 7 days. 12 capsule 3    ezetimibe-simvastatin 10-40 mg (VYTORIN) 10-40 mg per tablet Take 1 tablet by mouth once daily.      ferrous gluconate (FERGON) 324 MG tablet TAKE ONE TABLET BY MOUTH THREE TIMES DAILY WITH FOOD 90 tablet 11    fluticasone propionate (FLONASE) 50 mcg/actuation nasal spray 2 sprays by Each Nostril route once daily.      gabapentin (NEURONTIN) 100 MG capsule Take 1 capsule (100 mg total) by mouth 3 (three) times daily. 270 capsule 3    hydroCHLOROthiazide (HYDRODIURIL) 25 MG tablet TAKE ONE TABLET BY MOUTH TWICE DAILY 180 tablet 3    HYDROcodone-acetaminophen (NORCO)  mg per tablet Take 1 tablet by mouth every 4 (four) hours.      hydrOXYchloroQUINE (PLAQUENIL) 200 mg tablet Take 1 tablet (200 mg total) by mouth 2 (two) times daily. 180 tablet 3    hydrOXYzine HCL (ATARAX) 25 MG tablet Take 25 mg by mouth every 8 (eight) hours as needed.      montelukast (SINGULAIR) 10 mg tablet Take 10 mg by mouth once daily.      mycophenolate (CELLCEPT) 500 mg Tab Take 2 tablets (1,000 mg total) by mouth daily with dinner or evening meal. 180 tablet 3    omalizumab (XOLAIR) 150 mg/mL injection Inject 300 mg into the skin every 30 days.      omeprazole (PRILOSEC) 40 MG capsule Take 1 capsule (40 mg total) by mouth once daily. Before lunch 90 capsule 3    ondansetron (ZOFRAN) 4 MG tablet Take 1 tablet (4 mg total) by mouth every 6 (six) hours. 12 tablet 0    SITagliptan-metformin (JANUMET)  mg per tablet Take 1 tablet by mouth 2 (two) times daily with meals. 180 tablet 3    tiZANidine (ZANAFLEX) 4 MG tablet Take 2 tablets (8 mg total) by mouth nightly. 180 tablet 3    traZODone (DESYREL) 50 MG tablet Take 50 mg by mouth nightly.       VENTOLIN HFA 90 mcg/actuation inhaler use 2 puffs by mouth every 6 hours as needed for wheezing 18 g 11    [DISCONTINUED] VASCEPA 1 gram Cap TAKE TWO CAPSULES BY MOUTH TWICE DAILY 120 capsule 11       Social History     Socioeconomic History    Marital status:    Tobacco Use    Smoking status: Never    Smokeless tobacco: Never   Substance and Sexual Activity    Alcohol use: Not Currently    Drug use: Never    Sexual activity: Yes        Family History   Problem Relation Age of Onset    Cancer Mother     Stroke Sister     Hypertension Sister     Drug abuse Brother     Alcohol abuse Brother         Subjective:       Review of Systems:    See HPI for details    Constitutional: Denies Change in appetite. Denies Chills. Denies Fever. Denies Night sweats.  Eye: Denies Blurred vision. Denies Discharge. Denies Eye pain.  ENT: Denies Decreased hearing. Denies Sore throat. Denies Swollen glands.  Respiratory: Denies Cough. Denies Shortness of breath. Denies Shortness of breath with exertion. Denies Wheezing.  Cardiovascular: Denies Chest pain at rest. Denies Chest pain with exertion. Denies Irregular heartbeat. Denies Palpitations.  Gastrointestinal: Denies Abdominal pain. Denies Diarrhea. Denies Nausea. Denies Vomiting. Denies Hematemesis or Hematochezia.  Genitourinary: Denies Dysuria. Denies Urinary frequency. Denies Urinary urgency. Denies Blood in urine.  Endocrine: Denies Cold intolerance. Denies Excessive thirst. Denies Heat intolerance. Denies Weight loss. Denies Weight gain.  Musculoskeletal: Denies Painful joints. Denies Weakness.  Integumentary: Denies Rash. Denies Itching. Denies Dry skin.  Neurologic: Denies Dizziness. Denies Fainting. Denies Headache.  Psychiatric: Denies Depression. Denies Anxiety. Denies Suicidal/Homicidal ideations.    All Other ROS: Negative except as stated in HPI.       Objective:     /75 (BP Location: Left arm, Patient Position: Sitting, BP Method: Large (Automatic))   Pulse  64   Resp 17   Wt 70 kg (154 lb 4.8 oz)   SpO2 99%   BMI 30.13 kg/m²     Physical Exam    General: Alert and oriented, No acute distress. Obese.  Head: Normocephalic, Atraumatic.  Eye: Pupils are equal, round and reactive to light, Extraocular movements are intact, Sclera non-icteric.  Ears/Nose/Throat: Normal, Mucosa moist,Clear.  Neck/Thyroid: Supple, Non-tender, No carotid bruit, No palpable thyromegaly or thyroid nodule, No lymphadenopathy, No JVD, Full range of motion.  Respiratory: Clear to auscultation bilaterally; No wheezes, rales or rhonchi,Non-labored respirations, Symmetrical chest wall expansion.  Cardiovascular: Regular rate and rhythm, S1/S2 normal, No murmurs, rubs or gallops.  Gastrointestinal: Soft, Non-tender, Non-distended, Normal bowel sounds, No palpable organomegaly.  Musculoskeletal: Normal range of motion.  Integumentary: Warm, Dry, Intact, No suspicious lesions or rashes.  Extremities: No clubbing, cyanosis or edema  Neurologic: No focal deficits, Cranial Nerves II-XII are grossly intact, Motor strength normal upper and lower extremities, Sensory exam intact.  Psychiatric: Normal interaction, Coherent speech, Euthymic mood, Appropriate affect     *Lab results from 06/27/2023 were reviewed and discussed with patient and patient voices understanding.*    Assessment:       ICD-10-CM ICD-9-CM   1. Hyperlipidemia associated with type 2 diabetes mellitus  E11.69 250.80    E78.5 272.4        Plan:     Problem List Items Addressed This Visit    None  Visit Diagnoses       Hyperlipidemia associated with type 2 diabetes mellitus    -  Primary    Relevant Medications    evolocumab (REPATHA SURECLICK) 140 mg/mL PnIj    Other Relevant Orders    Comprehensive Metabolic Panel    Lipid Panel         1. Hyperlipidemia associated with type 2 diabetes mellitus  - Rx trial of evolocumab (REPATHA SURECLICK) 140 mg/mL PnIj; Inject 1 mL (140 mg total) into the skin every 14 (fourteen) days.  Dispense: 2 mL;  Refill: 11  - Comprehensive Metabolic Panel; Future in 10/2023  - Lipid Panel; Future in 10/2023  - Cholesterol is not controlled, LDL: 146, goal is <70. Discontinue Vascepa due to side effects. Continue Vytorin. Rx trial of adding Repatha to regimen. Continue followup with Cardiology as scheduled. Notify M.D. or ER if symptoms persist or worsen, temp >100.4, or any acute illness.    Continue  Stressed importance of dietary modifications. Follow a low cholesterol, low saturated fat diet with less that 200mg of cholesterol a day.  Avoid fried foods and high saturated fats (high saturated fats less than 7% of calories).  Add Flax Seed/Fish Oil supplements to diet. Increase dietary fiber.  Regular exercise can reduce LDL and raise HDL. Stressed importance of physical activity 5 times per week for 30 minutes per day.          Cheryl was seen today for medication problem.    Diagnoses and all orders for this visit:    Hyperlipidemia associated with type 2 diabetes mellitus  -     evolocumab (REPATHA SURECLICK) 140 mg/mL PnIj; Inject 1 mL (140 mg total) into the skin every 14 (fourteen) days.  -     Comprehensive Metabolic Panel; Future  -     Lipid Panel; Future          Medication List with Changes/Refills   New Medications    EVOLOCUMAB (REPATHA SURECLICK) 140 MG/ML PNIJ    Inject 1 mL (140 mg total) into the skin every 14 (fourteen) days.       Start Date: 7/18/2023 End Date: 7/17/2024   Current Medications    ALBUTEROL (PROVENTIL) 2.5 MG /3 ML (0.083 %) NEBULIZER SOLUTION    use one vial in nebulizer every 4 to 6 hours as needed for wheezing       Start Date: 6/1/2023  End Date: --    AMLODIPINE-BENAZEPRIL 5-10 MG (LOTREL) 5-10 MG PER CAPSULE    TAKE ONE CAPSULE BY MOUTH DAILY       Start Date: 6/1/2023  End Date: --    ASPIRIN (ECOTRIN) 81 MG EC TABLET    TAKE ONE TABLET BY MOUTH DAILY       Start Date: 5/25/2023 End Date: --    BETAMETHASONE VALERATE 0.1% (VALISONE) 0.1 % CREA    Apply topically 2 (two) times daily  as needed (rash).       Start Date: 3/29/2023 End Date: 6/27/2023    CETIRIZINE (ZYRTEC) 10 MG TABLET    Take 10 mg by mouth every evening.       Start Date: 10/4/2022 End Date: --    DICLOFENAC (VOLTAREN) 50 MG EC TABLET    TAKE ONE TABLET BY MOUTH TWICE DAILY AS NEEDED FOR PAIN TAKE AFTER FOOD       Start Date: 6/27/2023 End Date: --    EPINEPHRINE (EPIPEN) 0.3 MG/0.3 ML ATIN    Inject 1 each into the muscle daily as needed.       Start Date: 4/25/2022 End Date: --    ERGOCALCIFEROL (ERGOCALCIFEROL) 50,000 UNIT CAP    Take 1 capsule (50,000 Units total) by mouth every 7 days.       Start Date: 6/27/2023 End Date: 6/26/2024    EZETIMIBE-SIMVASTATIN 10-40 MG (VYTORIN) 10-40 MG PER TABLET    Take 1 tablet by mouth once daily.       Start Date: 7/30/2021 End Date: --    FERROUS GLUCONATE (FERGON) 324 MG TABLET    TAKE ONE TABLET BY MOUTH THREE TIMES DAILY WITH FOOD       Start Date: 6/6/2022  End Date: --    FLUTICASONE PROPIONATE (FLONASE) 50 MCG/ACTUATION NASAL SPRAY    2 sprays by Each Nostril route once daily.       Start Date: 6/3/2022  End Date: --    GABAPENTIN (NEURONTIN) 100 MG CAPSULE    Take 1 capsule (100 mg total) by mouth 3 (three) times daily.       Start Date: 6/27/2023 End Date: 6/26/2024    HYDROCHLOROTHIAZIDE (HYDRODIURIL) 25 MG TABLET    TAKE ONE TABLET BY MOUTH TWICE DAILY       Start Date: 6/13/2022 End Date: --    HYDROCODONE-ACETAMINOPHEN (NORCO)  MG PER TABLET    Take 1 tablet by mouth every 4 (four) hours.       Start Date: 6/3/2022  End Date: --    HYDROXYCHLOROQUINE (PLAQUENIL) 200 MG TABLET    Take 1 tablet (200 mg total) by mouth 2 (two) times daily.       Start Date: 6/27/2023 End Date: --    HYDROXYZINE HCL (ATARAX) 25 MG TABLET    Take 25 mg by mouth every 8 (eight) hours as needed.       Start Date: 5/19/2022 End Date: --    MONTELUKAST (SINGULAIR) 10 MG TABLET    Take 10 mg by mouth once daily.       Start Date: 6/22/2021 End Date: --    MYCOPHENOLATE (CELLCEPT) 500 MG TAB     Take 2 tablets (1,000 mg total) by mouth daily with dinner or evening meal.       Start Date: 6/27/2023 End Date: 6/26/2024    OMALIZUMAB (XOLAIR) 150 MG/ML INJECTION    Inject 300 mg into the skin every 30 days.       Start Date: 9/14/2021 End Date: --    OMEPRAZOLE (PRILOSEC) 40 MG CAPSULE    Take 1 capsule (40 mg total) by mouth once daily. Before lunch       Start Date: 6/27/2023 End Date: 6/26/2024    ONDANSETRON (ZOFRAN) 4 MG TABLET    Take 1 tablet (4 mg total) by mouth every 6 (six) hours.       Start Date: 3/20/2023 End Date: --    SITAGLIPTAN-METFORMIN (JANUMET)  MG PER TABLET    Take 1 tablet by mouth 2 (two) times daily with meals.       Start Date: 3/29/2023 End Date: 3/28/2024    TIZANIDINE (ZANAFLEX) 4 MG TABLET    Take 2 tablets (8 mg total) by mouth nightly.       Start Date: 6/27/2023 End Date: --    TRAZODONE (DESYREL) 50 MG TABLET    Take 50 mg by mouth nightly.       Start Date: 6/3/2022  End Date: --    VENTOLIN HFA 90 MCG/ACTUATION INHALER    use 2 puffs by mouth every 6 hours as needed for wheezing       Start Date: 5/24/2022 End Date: --   Discontinued Medications    VASCEPA 1 GRAM CAP    TAKE TWO CAPSULES BY MOUTH TWICE DAILY       Start Date: 6/1/2023  End Date: 7/18/2023          Follow up in about 3 months (around 10/18/2023) for Cholesterol Followup- 30 minute apointment; *Eye exam from Dr. Jerome*.

## 2023-07-18 NOTE — LETTER
AUTHORIZATION FOR RELEASE OF   CONFIDENTIAL INFORMATION    Dear Dr Jerome,    We are seeing Cheryl Solis, date of birth 1955, in the clinic at [facility]. Monica Max MD is the patient's PCP. Cheryl Solis has an outstanding lab/procedure at the time we reviewed her chart. In order to help keep her health information updated, she has authorized us to request the following medical record(s):        (  )  MAMMOGRAM                                      (  )  COLONOSCOPY      (  )  PAP SMEAR                                          (  )  OUTSIDE LAB RESULTS     (  )  DEXA SCAN                                          ( X )  EYE EXAM            (  )  FOOT EXAM                                          (  )  ENTIRE RECORD     (  )  OUTSIDE IMMUNIZATIONS                 (  )  _______________         Please fax records to Monica Max MD, [phone]     If you have any questions, please contact Eloisa Canseco LPN at [phone].           Patient Name: Cheryl Solis  : 1955  Patient Phone #: [phone]

## 2023-07-24 LAB — CRC RECOMMENDATION EXT: NORMAL

## 2023-07-25 ENCOUNTER — DOCUMENTATION ONLY (OUTPATIENT)
Dept: FAMILY MEDICINE | Facility: CLINIC | Age: 68
End: 2023-07-25
Payer: MEDICARE

## 2023-07-31 DIAGNOSIS — J44.9 CHRONIC OBSTRUCTIVE PULMONARY DISEASE, UNSPECIFIED: ICD-10-CM

## 2023-07-31 RX ORDER — ALBUTEROL SULFATE 90 UG/1
AEROSOL, METERED RESPIRATORY (INHALATION)
Qty: 18 G | Refills: 11 | Status: SHIPPED | OUTPATIENT
Start: 2023-07-31

## 2023-07-31 RX ORDER — FERROUS GLUCONATE 324(38)MG
TABLET ORAL
Qty: 90 TABLET | Refills: 11 | Status: SHIPPED | OUTPATIENT
Start: 2023-07-31

## 2023-07-31 NOTE — TELEPHONE ENCOUNTER
Pharmacy sent a fax requesting ICD J45.998 other asthma to be added to the office note from 3/29/2023 to cover the Albuterol that pt needs. Please advise Thanks

## 2023-08-01 ENCOUNTER — TELEPHONE (OUTPATIENT)
Dept: FAMILY MEDICINE | Facility: CLINIC | Age: 68
End: 2023-08-01
Payer: MEDICARE

## 2023-08-01 PROBLEM — J45.998 ASTHMA, PERSISTENT CONTROLLED: Status: ACTIVE | Noted: 2023-08-01

## 2023-08-01 NOTE — TELEPHONE ENCOUNTER
----- Message from Eder Briseno sent at 8/1/2023 11:10 AM CDT -----  .Type:  Needs Medical Advice    Who Called: pt  Symptoms (please be specific):    How long has patient had these symptoms:    Pharmacy name and phone #:    Would the patient rather a call back or a response via [facility]? Call back  Best Call Back Number: [phone]  Additional Information: pt is calling about a prior authorization about a cholesterol medication that she received a text message about, pt could not clarify name of medication

## 2023-08-01 NOTE — TELEPHONE ENCOUNTER
I voiced to pt she needs to call her pharmacy and see what medications need a PA. Pt voiced understanding and thanks.

## 2023-08-01 NOTE — TELEPHONE ENCOUNTER
Pharmacy sent fax requesting Dx code of J45.998 to be added to office visit on 3/29/2023. This will help them get the pts Albuterol covered by insurance. Please advise. Thanks

## 2023-08-16 DIAGNOSIS — E04.1 SINGLE THYROID NODULE: Primary | ICD-10-CM

## 2023-08-23 ENCOUNTER — HOSPITAL ENCOUNTER (OUTPATIENT)
Dept: RADIOLOGY | Facility: HOSPITAL | Age: 68
Discharge: HOME | End: 2023-08-23
Attending: OTOLARYNGOLOGY
Payer: MEDICARE

## 2023-08-23 DIAGNOSIS — E04.1 SINGLE THYROID NODULE: ICD-10-CM

## 2023-08-23 PROCEDURE — 76536 US EXAM OF HEAD AND NECK: CPT | Mod: TC

## 2023-08-23 RX ORDER — HYDROCHLOROTHIAZIDE 25 MG/1
TABLET ORAL
Qty: 180 TABLET | Refills: 3 | Status: SHIPPED | OUTPATIENT
Start: 2023-08-23

## 2023-10-03 DIAGNOSIS — E11.69 HYPERLIPIDEMIA ASSOCIATED WITH TYPE 2 DIABETES MELLITUS: ICD-10-CM

## 2023-10-03 DIAGNOSIS — E78.5 HYPERLIPIDEMIA ASSOCIATED WITH TYPE 2 DIABETES MELLITUS: ICD-10-CM

## 2023-11-12 ENCOUNTER — HOSPITAL ENCOUNTER (EMERGENCY)
Facility: HOSPITAL | Age: 68
Discharge: HOME | End: 2023-11-13
Attending: STUDENT IN AN ORGANIZED HEALTH CARE EDUCATION/TRAINING PROGRAM
Payer: MEDICARE

## 2023-11-12 DIAGNOSIS — A08.4 VIRAL GASTROENTERITIS: Primary | ICD-10-CM

## 2023-11-12 LAB
ALBUMIN SERPL-MCNC: 4.4 G/DL (ref 3.4–4.8)
ALBUMIN/GLOB SERPL: 1 RATIO (ref 1.1–2)
ALP SERPL-CCNC: 89 UNIT/L (ref 40–150)
ALT SERPL-CCNC: 39 UNIT/L (ref 0–55)
AST SERPL-CCNC: 36 UNIT/L (ref 5–34)
BASOPHILS # BLD AUTO: 0.02 X10(3)/MCL
BASOPHILS NFR BLD AUTO: 0.1 %
BILIRUB SERPL-MCNC: 0.7 MG/DL
BUN SERPL-MCNC: 10.3 MG/DL (ref 9.8–20.1)
CALCIUM SERPL-MCNC: 9.6 MG/DL (ref 8.4–10.2)
CHLORIDE SERPL-SCNC: 107 MMOL/L (ref 98–107)
CO2 SERPL-SCNC: 18 MMOL/L (ref 23–31)
CREAT SERPL-MCNC: 0.88 MG/DL (ref 0.55–1.02)
EOSINOPHIL # BLD AUTO: 0 X10(3)/MCL (ref 0–0.9)
EOSINOPHIL NFR BLD AUTO: 0 %
ERYTHROCYTE [DISTWIDTH] IN BLOOD BY AUTOMATED COUNT: 14.3 % (ref 11.5–17)
FLUAV AG UPPER RESP QL IA.RAPID: NOT DETECTED
FLUBV AG UPPER RESP QL IA.RAPID: NOT DETECTED
GFR SERPLBLD CREATININE-BSD FMLA CKD-EPI: >60 MLS/MIN/1.73/M2
GLOBULIN SER-MCNC: 4.3 GM/DL (ref 2.4–3.5)
GLUCOSE SERPL-MCNC: 203 MG/DL (ref 82–115)
HCT VFR BLD AUTO: 37.2 % (ref 37–47)
HGB BLD-MCNC: 12.9 G/DL (ref 12–16)
IMM GRANULOCYTES # BLD AUTO: 0.02 X10(3)/MCL (ref 0–0.04)
IMM GRANULOCYTES NFR BLD AUTO: 0.1 %
LIPASE SERPL-CCNC: 8 U/L
LYMPHOCYTES # BLD AUTO: 0.5 X10(3)/MCL (ref 0.6–4.6)
LYMPHOCYTES NFR BLD AUTO: 3.5 %
MCH RBC QN AUTO: 28.7 PG (ref 27–31)
MCHC RBC AUTO-ENTMCNC: 34.7 G/DL (ref 33–36)
MCV RBC AUTO: 82.7 FL (ref 80–94)
MONOCYTES # BLD AUTO: 0.3 X10(3)/MCL (ref 0.1–1.3)
MONOCYTES NFR BLD AUTO: 2.1 %
NEUTROPHILS # BLD AUTO: 13.44 X10(3)/MCL (ref 2.1–9.2)
NEUTROPHILS NFR BLD AUTO: 94.2 %
PLATELET # BLD AUTO: 258 X10(3)/MCL (ref 130–400)
PMV BLD AUTO: 11.2 FL (ref 7.4–10.4)
POC CARDIAC TROPONIN I: 0.08 NG/ML (ref 0–0.08)
POTASSIUM SERPL-SCNC: 3.8 MMOL/L (ref 3.5–5.1)
PROT SERPL-MCNC: 8.7 GM/DL (ref 5.8–7.6)
RBC # BLD AUTO: 4.5 X10(6)/MCL (ref 4.2–5.4)
SAMPLE: NORMAL
SARS-COV-2 RNA RESP QL NAA+PROBE: NOT DETECTED
SODIUM SERPL-SCNC: 140 MMOL/L (ref 136–145)
WBC # SPEC AUTO: 14.28 X10(3)/MCL (ref 4.5–11.5)

## 2023-11-12 PROCEDURE — 63600175 PHARM REV CODE 636 W HCPCS: Performed by: STUDENT IN AN ORGANIZED HEALTH CARE EDUCATION/TRAINING PROGRAM

## 2023-11-12 PROCEDURE — 99285 EMERGENCY DEPT VISIT HI MDM: CPT | Mod: 25

## 2023-11-12 PROCEDURE — 96365 THER/PROPH/DIAG IV INF INIT: CPT | Mod: 59

## 2023-11-12 PROCEDURE — 93005 ELECTROCARDIOGRAM TRACING: CPT

## 2023-11-12 PROCEDURE — 93010 ELECTROCARDIOGRAM REPORT: CPT | Mod: ,,, | Performed by: STUDENT IN AN ORGANIZED HEALTH CARE EDUCATION/TRAINING PROGRAM

## 2023-11-12 PROCEDURE — 81001 URINALYSIS AUTO W/SCOPE: CPT | Performed by: STUDENT IN AN ORGANIZED HEALTH CARE EDUCATION/TRAINING PROGRAM

## 2023-11-12 PROCEDURE — 96375 TX/PRO/DX INJ NEW DRUG ADDON: CPT

## 2023-11-12 PROCEDURE — 99900035 HC TECH TIME PER 15 MIN (STAT)

## 2023-11-12 PROCEDURE — 80053 COMPREHEN METABOLIC PANEL: CPT | Performed by: STUDENT IN AN ORGANIZED HEALTH CARE EDUCATION/TRAINING PROGRAM

## 2023-11-12 PROCEDURE — 25000003 PHARM REV CODE 250: Performed by: STUDENT IN AN ORGANIZED HEALTH CARE EDUCATION/TRAINING PROGRAM

## 2023-11-12 PROCEDURE — 83690 ASSAY OF LIPASE: CPT | Performed by: STUDENT IN AN ORGANIZED HEALTH CARE EDUCATION/TRAINING PROGRAM

## 2023-11-12 PROCEDURE — 93010 EKG 12-LEAD: ICD-10-PCS | Mod: ,,, | Performed by: STUDENT IN AN ORGANIZED HEALTH CARE EDUCATION/TRAINING PROGRAM

## 2023-11-12 PROCEDURE — 0240U COVID/FLU A&B PCR: CPT | Performed by: STUDENT IN AN ORGANIZED HEALTH CARE EDUCATION/TRAINING PROGRAM

## 2023-11-12 PROCEDURE — 25500020 PHARM REV CODE 255: Performed by: STUDENT IN AN ORGANIZED HEALTH CARE EDUCATION/TRAINING PROGRAM

## 2023-11-12 PROCEDURE — 85025 COMPLETE CBC W/AUTO DIFF WBC: CPT | Performed by: STUDENT IN AN ORGANIZED HEALTH CARE EDUCATION/TRAINING PROGRAM

## 2023-11-12 RX ORDER — MAG HYDROX/ALUMINUM HYD/SIMETH 200-200-20
30 SUSPENSION, ORAL (FINAL DOSE FORM) ORAL ONCE
Status: COMPLETED | OUTPATIENT
Start: 2023-11-13 | End: 2023-11-13

## 2023-11-12 RX ORDER — ACETAMINOPHEN 500 MG
1000 TABLET ORAL
Status: COMPLETED | OUTPATIENT
Start: 2023-11-13 | End: 2023-11-13

## 2023-11-12 RX ORDER — FAMOTIDINE 10 MG/ML
20 INJECTION INTRAVENOUS
Status: COMPLETED | OUTPATIENT
Start: 2023-11-12 | End: 2023-11-12

## 2023-11-12 RX ORDER — SODIUM CHLORIDE 9 MG/ML
1000 INJECTION, SOLUTION INTRAVENOUS
Status: COMPLETED | OUTPATIENT
Start: 2023-11-12 | End: 2023-11-12

## 2023-11-12 RX ADMIN — SODIUM CHLORIDE 1000 ML: 9 INJECTION, SOLUTION INTRAVENOUS at 10:11

## 2023-11-12 RX ADMIN — IOPAMIDOL 100 ML: 755 INJECTION, SOLUTION INTRAVENOUS at 10:11

## 2023-11-12 RX ADMIN — PROMETHAZINE HYDROCHLORIDE 12.5 MG: 25 INJECTION INTRAMUSCULAR; INTRAVENOUS at 10:11

## 2023-11-12 RX ADMIN — FAMOTIDINE 20 MG: 10 INJECTION, SOLUTION INTRAVENOUS at 11:11

## 2023-11-13 VITALS
SYSTOLIC BLOOD PRESSURE: 173 MMHG | DIASTOLIC BLOOD PRESSURE: 114 MMHG | HEART RATE: 91 BPM | OXYGEN SATURATION: 99 % | RESPIRATION RATE: 22 BRPM | TEMPERATURE: 100 F

## 2023-11-13 LAB
APPEARANCE UR: CLEAR
BACTERIA #/AREA URNS AUTO: NORMAL /HPF
BILIRUB UR QL STRIP.AUTO: NEGATIVE
COLOR UR AUTO: YELLOW
GLUCOSE UR QL STRIP.AUTO: 100
KETONES UR QL STRIP.AUTO: 40
LEUKOCYTE ESTERASE UR QL STRIP.AUTO: NEGATIVE
NITRITE UR QL STRIP.AUTO: NEGATIVE
PH UR STRIP.AUTO: 7 [PH]
POC CARDIAC TROPONIN I: 0 NG/ML (ref 0–0.08)
PROT UR QL STRIP.AUTO: 100
RBC #/AREA URNS AUTO: NORMAL /HPF
RBC UR QL AUTO: ABNORMAL
SAMPLE: NORMAL
SP GR UR STRIP.AUTO: 1.01 (ref 1–1.03)
SQUAMOUS #/AREA URNS AUTO: NORMAL /HPF
UROBILINOGEN UR STRIP-ACNC: 0.2
WBC #/AREA URNS AUTO: NORMAL /HPF

## 2023-11-13 PROCEDURE — 25000003 PHARM REV CODE 250: Performed by: STUDENT IN AN ORGANIZED HEALTH CARE EDUCATION/TRAINING PROGRAM

## 2023-11-13 RX ORDER — ONDANSETRON 4 MG/1
4 TABLET, ORALLY DISINTEGRATING ORAL EVERY 8 HOURS PRN
Qty: 10 TABLET | Refills: 0 | Status: SHIPPED | OUTPATIENT
Start: 2023-11-13

## 2023-11-13 RX ADMIN — ALUMINUM HYDROXIDE, MAGNESIUM HYDROXIDE, AND SIMETHICONE 30 ML: 200; 200; 20 SUSPENSION ORAL at 12:11

## 2023-11-13 RX ADMIN — ACETAMINOPHEN 1000 MG: 500 TABLET, FILM COATED ORAL at 12:11

## 2023-11-13 NOTE — ED PROVIDER NOTES
"Encounter Date: 2023       History     Chief Complaint   Patient presents with    Nausea    Generalized Body Aches     Fever/n/v/weak/.chills x2 days     Patient is a 68-year-old  female with a history of hypertension and diabetes who presented to the ER today due to nausea, vomiting and diarrhea.  Patient states onset was 2 days prior to arrival.  Patient states she is grandchildren have "stomach bug. " patient points to epigastric region as the source of her abdominal pain.  Patient states she feels extremely weak and dehydrated.  She denies any notable chest pain or shortness of breath.  She denies any fevers, chills.  She denies any other complaints.      Review of patient's allergies indicates:   Allergen Reactions    Metformin Rash     Rash (skin)^  Rash (skin)^      Morphine Itching     Itchy (skin)^  Itchy (skin)^      Shellfish containing products     Vascepa [icosapent ethyl] Hives    Ibuprofen Rash     Hives (skin)^  Hives (skin)^       Past Medical History:   Diagnosis Date    Anemia, unspecified     Fibromyalgia     HTN (hypertension)     Mixed hyperlipidemia     Obesity, unspecified     Sickle-cell disease without crisis     Sleep apnea, unspecified     Type 2 diabetes mellitus without complications     Vitamin D deficiency      Past Surgical History:   Procedure Laterality Date    BACK SURGERY      CARPAL TUNNEL RELEASE Bilateral      SECTION       SECTION       SECTION      COLONOSCOPY      COLONOSCOPY W/ POLYPECTOMY  2013    FOOT SURGERY Left     lasar eye       SYMPATHECTOMY       Family History   Problem Relation Age of Onset    Cancer Mother     Stroke Sister     Hypertension Sister     Drug abuse Brother     Alcohol abuse Brother      Social History     Tobacco Use    Smoking status: Never    Smokeless tobacco: Never   Substance Use Topics    Alcohol use: Not Currently    Drug use: Never     Review of Systems   Constitutional:  Positive for " chills. Negative for fatigue and fever.   HENT:  Negative for congestion, sore throat and trouble swallowing.    Eyes:  Negative for pain and visual disturbance.   Respiratory:  Negative for cough, shortness of breath and wheezing.    Cardiovascular:  Negative for chest pain and palpitations.   Gastrointestinal:  Positive for abdominal pain, diarrhea, nausea and vomiting. Negative for blood in stool and constipation.   Genitourinary:  Negative for dysuria and hematuria.   Musculoskeletal:  Negative for back pain and myalgias.   Skin:  Negative for rash and wound.   Neurological:  Negative for seizures, syncope and headaches.   Psychiatric/Behavioral:  Negative for confusion. The patient is not nervous/anxious.        Physical Exam     Initial Vitals [11/12/23 1946]   BP Pulse Resp Temp SpO2   (!) 175/109 95 (!) 22 100.1 °F (37.8 °C) 99 %      MAP       --         Physical Exam    Nursing note and vitals reviewed.  Constitutional: She appears well-developed and well-nourished. She is not diaphoretic. No distress.   HENT:   Head: Normocephalic.   Right Ear: External ear normal.   Left Ear: External ear normal.   Nose: Nose normal.   Eyes: Conjunctivae and EOM are normal. Right eye exhibits no discharge. Left eye exhibits no discharge. No scleral icterus.   Neck:   Normal range of motion.  Cardiovascular:  Normal rate, regular rhythm and normal heart sounds.     Exam reveals no gallop and no friction rub.       No murmur heard.  Pulmonary/Chest: Breath sounds normal. No stridor. No respiratory distress. She has no wheezes. She has no rhonchi. She has no rales.   Abdominal: Abdomen is soft. She exhibits no distension. There is abdominal tenderness.   Tenderness in the epigastric region.  Negative Galindo sign.  Abdomen is soft.  No rebound tenderness or guarding. There is no rebound and no guarding.   Musculoskeletal:         General: Normal range of motion.      Cervical back: Normal range of motion.     Neurological:  She is alert and oriented to person, place, and time. She has normal strength. No cranial nerve deficit or sensory deficit. GCS score is 15. GCS eye subscore is 4. GCS verbal subscore is 5. GCS motor subscore is 6.   Skin: Skin is warm. No rash noted. No erythema.   Psychiatric: She has a normal mood and affect. Her behavior is normal.         ED Course   Procedures  Labs Reviewed   COMPREHENSIVE METABOLIC PANEL - Abnormal; Notable for the following components:       Result Value    Carbon Dioxide 18 (*)     Glucose Level 203 (*)     Protein Total 8.7 (*)     Globulin 4.3 (*)     Albumin/Globulin Ratio 1.0 (*)     Aspartate Aminotransferase 36 (*)     All other components within normal limits   CBC WITH DIFFERENTIAL - Abnormal; Notable for the following components:    WBC 14.28 (*)     MPV 11.2 (*)     Lymph # 0.50 (*)     Neut # 13.44 (*)     All other components within normal limits   COVID/FLU A&B PCR - Normal    Narrative:     The Xpert Xpress SARS-CoV-2/FLU/RSV plus is a rapid, multiplexed real-time PCR test intended for the simultaneous qualitative detection and differentiation of SARS-CoV-2, Influenza A, Influenza B, and respiratory syncytial virus (RSV) viral RNA in either nasopharyngeal swab or nasal swab specimens.         LIPASE - Normal   CBC W/ AUTO DIFFERENTIAL    Narrative:     The following orders were created for panel order CBC Auto Differential.  Procedure                               Abnormality         Status                     ---------                               -----------         ------                     CBC with Differential[[id number]]        Abnormal            Final result                 Please view results for these tests on the individual orders.   TROPONIN ISTAT   TROPONIN ISTAT   URINALYSIS, REFLEX TO URINE CULTURE   POCT TROPONIN     EKG Readings: (Independently Interpreted)   Initial Reading: No STEMI. Rhythm: Normal Sinus Rhythm. Heart Rate: 91. Ectopy: No Ectopy.  Conduction: Normal. ST Segments: Normal ST Segments. T Waves: Normal. Axis: Normal.       Imaging Results              CT Head Without Contrast (Preliminary result)  Result time 11/13/23 00:43:09      Preliminary result by Cliff Brumfield MD (11/13/23 00:43:09)                   Narrative:    START OF REPORT:  Technique: CT of the head was performed without intravenous contrast with axial as well as coronal and sagittal images.    Comparison: None.    Dosage Information: Automated Exposure Control was utilized 966.97 mGy.cm.    Clinical history: Nausea and Generalized Body Aches (Fever/n/v/weak/.chills x2 days).    Findings:  Hemorrhage: No acute intracranial hemorrhage is seen.  CSF spaces: The ventricles sulci and basal cisterns are within normal limits.  Brain parenchyma: There is preservation of the grey white junction throughout. No acute infarct is identified. Subtle microvascular change is seen in portions of the periventricular and deep white matter tracts.  Cerebellum: Unremarkable.  Vascular: Unremarkable venous sinuses.  Sella and skull base: The sella appears to be within normal limits for age.  Cerebellopontine angles: Within normal limits.  Herniation: None.  Intracranial calcifications: Incidental note is made of bilateral choroid plexus calcification. Incidental note is made of some pineal region calcification.  Calvarium: No acute linear or depressed skull fracture is seen.    Maxillofacial Structures:  Paranasal sinuses: The visualized paranasal sinuses appear clear with no mucoperiosteal thickening or air fluid levels identified.  Orbits: The orbits appear unremarkable.  Zygomatic arches: The zygomatic arches are intact and unremarkable.  Temporal bones and mastoids: The temporal bones and mastoids appear unremarkable.  TMJ: The mandibular condyles appear normally placed with respect to the mandibular fossa.  Nasal Bones: No displaced nasal bone fracture is seen.      Impression:  1. No acute  intracranial process identified. Details and other findings as noted above.                                         CT Abdomen Pelvis With IV Contrast (Preliminary result)  Result time 11/12/23 23:18:14      Preliminary result by Cliff Brumfield MD (11/12/23 23:18:14)                   Narrative:    START OF REPORT:  Technique: CT of the abdomen and pelvis was performed with axial images as well as sagittal and coronal reconstruction images with intravenous contrast.    Comparison: Comparison is with study dated 2022-10-14 12:03:31.    Clinical History: Nausea and Generalized Body Aches (Fever/n/v/weak/.chills x2 days).    Dosage Information: Automated Exposure Control was utilized 602.37 mGy.cm.    Findings:  Lines and Tubes: None.  Thorax:  Lungs: The visualized lung bases appear unremarkable. No focal infiltrate or consolidation is seen.  Pleura: No effusions or thickening. No pneumothorax is seen.  Heart: The heart size is within normal limits.  Abdomen:  Abdominal Wall: No abdominal wall pathology is seen.  Liver: The liver appears unremarkable.  Biliary System: No intrahepatic or extrahepatic biliary duct dilatation is seen.  Gallbladder: The gallbladder appears unremarkable.  Pancreas: The pancreas appears unremarkable.  Spleen: The spleen appears unremarkable.  Adrenals: The adrenal glands appear unremarkable.  Kidneys: The kidneys appear unremarkable with no stones cysts masses or hydronephrosis.  Aorta: The abdominal aorta appears unremarkable.  Bowel:  Esophagus: The visualized esophagus appears unremarkable.  Stomach: The stomach appears unremarkable.  Duodenum: Unremarkable appearing duodenum.  Small Bowel: The small bowel appears unremarkable.  Colon: Nondistended.  Appendix: The appendix appears unremarkable seen on Image 38, Series 601.  Peritoneum: No intraperitoneal free air or ascites is seen.    Pelvis:  Bladder: The bladder is nondistended but appears otherwise unremarkable.  Female:  Uterus:  There are multiple stable calcified uterine fibroids seen.  Ovaries: The ovaries appear unremarkable.    Bony structures:  Dorsal Spine: There is moderate multilevel spondylosis of the visualized dorsal spine. There is a posterior spine stabilisation prosthesis seen in position at the L4 and L5 level with intervening disc cage.  Bony Pelvis: The visualized bony structures of the pelvis appear unremarkable.      Impression:  1. No acute intraabdominal or pelvic solid organ or bowel pathology identified. Details and findings as discussed.                                         Medications   promethazine (PHENERGAN) 12.5 mg in dextrose 5 % (D5W) 50 mL IVPB (0 mg Intravenous Stopped 11/12/23 2305)   0.9%  NaCl infusion (1,000 mLs Intravenous New Bag 11/12/23 2244)   iopamidoL (ISOVUE-370) injection 100 mL (100 mLs Intravenous Given 11/12/23 2249)   famotidine (PF) injection 20 mg (20 mg Intravenous Given 11/12/23 2345)   aluminum-magnesium hydroxide-simethicone 200-200-20 mg/5 mL suspension 30 mL (30 mLs Oral Given 11/13/23 0045)   acetaminophen tablet 1,000 mg (1,000 mg Oral Given 11/13/23 0000)     Medical Decision Making  Differentials: Viral gastroenteritis, heard, other intra-abdominal cause, electrolyte disturbance, dehydration   Well-appearing 68-year-old female presents to the ER today due to viral symptoms.  Patient here with her granddaughter who has similar symptoms and known contact with viral gastroenteritis.  Patient states she feels dehydrated thus fluids was started.  Basic labs showed a mild leukocytosis but no other electrolyte abnormalities noted.  Phenergan and Bentyl given with good relief.  CT abdomen pelvis showed no acute findings and CT head for headache was unremarkable as well.  Gross neuro exam is unremarkable.  After medications were given patient states she felt better and feels that she is fine to go home p.o. viral gastroenteritis leads the differential.  Advised symptomatic care, Zofran  for nausea and adequate hydration.  Patient passed a p.o. challenge in the ER.  All questions answered in layman's terms and return precautions were discussed.    Amount and/or Complexity of Data Reviewed  Labs: ordered. Decision-making details documented in ED Course.  Radiology: ordered. Decision-making details documented in ED Course.  ECG/medicine tests: ordered and independent interpretation performed. Decision-making details documented in ED Course.    Risk  OTC drugs.  Prescription drug management.                               Clinical Impression:   Final diagnoses:  [A08.4] Viral gastroenteritis (Primary)        ED Disposition Condition    Discharge Stable          ED Prescriptions       Medication Sig Dispense Start Date End Date Auth. Provider    ondansetron (ZOFRAN-ODT) 4 MG TbDL Take 1 tablet (4 mg total) by mouth every 8 (eight) hours as needed (nausea). 10 tablet 11/13/2023 -- Stephen Davila MD          Follow-up Information       Follow up With Specialties Details Why Contact Info    [facility] - Emergency Dept Emergency Medicine  If symptoms worsen [address] [address] [phone]    Monica Max MD Family Medicine Schedule an appointment as soon as possible for a visit   [address] [facility]  [address]  [facility] [address] [phone]               Stephen Davila MD  11/13/23 0056

## 2023-11-13 NOTE — ED NOTES
When patient brought to room, daughter states patient is not co chest pain and has vomited twice. Ekg ordered placed.

## 2024-04-05 ENCOUNTER — OUTPATIENT CASE MANAGEMENT (OUTPATIENT)
Dept: ADMINISTRATIVE | Facility: OTHER | Age: 69
End: 2024-04-05
Payer: MEDICARE

## 2024-04-05 ENCOUNTER — OFFICE VISIT (OUTPATIENT)
Dept: FAMILY MEDICINE | Facility: CLINIC | Age: 69
End: 2024-04-05
Payer: MEDICARE

## 2024-04-05 VITALS
WEIGHT: 157.38 LBS | SYSTOLIC BLOOD PRESSURE: 101 MMHG | BODY MASS INDEX: 30.9 KG/M2 | HEIGHT: 60 IN | HEART RATE: 58 BPM | DIASTOLIC BLOOD PRESSURE: 65 MMHG | OXYGEN SATURATION: 98 %

## 2024-04-05 DIAGNOSIS — Z79.899 DRUG-INDUCED IMMUNODEFICIENCY: ICD-10-CM

## 2024-04-05 DIAGNOSIS — Z12.31 VISIT FOR SCREENING MAMMOGRAM: ICD-10-CM

## 2024-04-05 DIAGNOSIS — E11.69 HYPERLIPIDEMIA ASSOCIATED WITH TYPE 2 DIABETES MELLITUS: ICD-10-CM

## 2024-04-05 DIAGNOSIS — L30.9 DERMATITIS: ICD-10-CM

## 2024-04-05 DIAGNOSIS — E11.42 CONTROLLED TYPE 2 DIABETES MELLITUS WITH DIABETIC POLYNEUROPATHY, WITHOUT LONG-TERM CURRENT USE OF INSULIN: ICD-10-CM

## 2024-04-05 DIAGNOSIS — Z13.820 SCREENING FOR OSTEOPOROSIS: ICD-10-CM

## 2024-04-05 DIAGNOSIS — E78.5 HYPERLIPIDEMIA ASSOCIATED WITH TYPE 2 DIABETES MELLITUS: ICD-10-CM

## 2024-04-05 DIAGNOSIS — M35.1 MCTD (MIXED CONNECTIVE TISSUE DISEASE): ICD-10-CM

## 2024-04-05 DIAGNOSIS — D84.821 DRUG-INDUCED IMMUNODEFICIENCY: ICD-10-CM

## 2024-04-05 DIAGNOSIS — I73.9 PVD (PERIPHERAL VASCULAR DISEASE): ICD-10-CM

## 2024-04-05 DIAGNOSIS — R41.3 OTHER AMNESIA: ICD-10-CM

## 2024-04-05 DIAGNOSIS — R41.3 SHORT-TERM MEMORY LOSS: ICD-10-CM

## 2024-04-05 DIAGNOSIS — D57.3 SICKLE-CELL TRAIT: ICD-10-CM

## 2024-04-05 DIAGNOSIS — E66.09 CLASS 1 OBESITY DUE TO EXCESS CALORIES WITH SERIOUS COMORBIDITY AND BODY MASS INDEX (BMI) OF 30.0 TO 30.9 IN ADULT: ICD-10-CM

## 2024-04-05 DIAGNOSIS — R79.9 ABNORMAL FINDING OF BLOOD CHEMISTRY, UNSPECIFIED: ICD-10-CM

## 2024-04-05 DIAGNOSIS — I15.2 HYPERTENSION ASSOCIATED WITH DIABETES: ICD-10-CM

## 2024-04-05 DIAGNOSIS — Z00.00 MEDICARE ANNUAL WELLNESS VISIT, SUBSEQUENT: Primary | ICD-10-CM

## 2024-04-05 DIAGNOSIS — B35.3 TINEA PEDIS OF LEFT FOOT: ICD-10-CM

## 2024-04-05 DIAGNOSIS — E55.9 VITAMIN D DEFICIENCY: ICD-10-CM

## 2024-04-05 DIAGNOSIS — E11.59 HYPERTENSION ASSOCIATED WITH DIABETES: ICD-10-CM

## 2024-04-05 DIAGNOSIS — Z78.0 POSTMENOPAUSAL: ICD-10-CM

## 2024-04-05 PROCEDURE — G0439 PPPS, SUBSEQ VISIT: HCPCS | Mod: ,,, | Performed by: FAMILY MEDICINE

## 2024-04-05 PROCEDURE — 99214 OFFICE O/P EST MOD 30 MIN: CPT | Mod: 25,,, | Performed by: FAMILY MEDICINE

## 2024-04-05 RX ORDER — CLOBETASOL PROPIONATE 0.5 MG/G
CREAM TOPICAL 2 TIMES DAILY PRN
Qty: 60 G | Refills: 1 | Status: SHIPPED | OUTPATIENT
Start: 2024-04-05 | End: 2024-05-02

## 2024-04-05 RX ORDER — KETOCONAZOLE 20 MG/G
CREAM TOPICAL DAILY
Qty: 60 G | Refills: 1 | Status: SHIPPED | OUTPATIENT
Start: 2024-04-05 | End: 2024-05-02

## 2024-04-05 NOTE — LETTER
April 8, 2024             Dear Cheryl    [place] to [facility] Complex Care Management [facility].  It was a pleasure talking with you today.  My name is Key Foster RN,  and I look forward to being your Care Manager.  My goal is to help you function at the healthiest and highest level possible.  You can contact me directly at [phone].    As an [facility] patient, some of the services we may be able to provide include:     Development of an individualized care plan with a Registered Nurse   Connection with a   Connection with available resources and services    Coordinate communication among your care team members   Provide coaching and education   Help you understand your doctors treatment plan  Help you obtain information about your insurance coverage.     All services provided by [facility]s Complex Care Managers and other care team members are coordinated with and communicated to your primary care team.      As part of your enrollment, you will be receiving education materials and more information about these services in your [facility] account, by phone or through the mail.  If you do not wish to participate or receive information, please contact our office at [phone].      Sincerely,        Key Foster RN  [facility]   Out-patient RN Complex Care Manager

## 2024-04-05 NOTE — PROGRESS NOTES
Patient ID: [id number]     Chief Complaint: Medicare AWV        HPI:     Cheryl Solis is a 68 y.o. female here today for a Medicare Wellness.   The patient presents for well adult exam, The patient's general health status is described as good. The patient's diet is described as balanced. Exercise: occasional. Associated symptoms consist of denies weight loss, denies weight gain, denies fatigue, denies headache, denies hearing loss and denies vision changes. Last menstrual period: Menopause x 2002. Additional pertinent history: last dental exam: 2/9/2015 (she reports that she cannot afford dental appointment at this time and she refuses services, risks of refusal discussed with patient. ), last eye exam: 06/28/2023 (with Ophthalmology (Dr. Jerome)), last pap smear : 2/9/2017 (WNL with neg HPV at [facility]- Dr. Wells), last colonoscopy:07/24/2023 (with GI (Dr. Swanson)), she is due for Colonoscopy in 2030, seat belt use, occasional caffeine use, tobacco use none, no alcohol use and She follows up with ENT (Dr. Temple) for thyroid nodules, which have been stable, follows up annually. She does followup with pain management and Rheumatology (Dr. Cooper) as scheduled for chronic pain. DM II is controlled with Rx, no adverse Rx side effects, asymptomatic, fasting CBGs have been 's, she denies polyuria, denies polydipsia, denies polydipsia. 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 HTN is controlled with Rx, no side effects, asymptomatic, she would like to be enrolled in digital medicine program, she would like care management referral. Hypercholesterolemia is controlled with Rx, no side effects, asymptomatic. COPD/Asthma is stable with current Rx, asymptomatic, followup by Pulmonary (Dr. Randall). Last MMG: 10/14/2022, WNL, she needs scheduled, Last DEXA scan: 09/13/2021, WNL, due in 09/2023. She is due for annual labs, she would like MMG titers. She is UTD on  vaccines. She does followup with Vascular (Dr. Suh) as scheduled for cardiovascular management/ PAD/PVD, which are asymptomatic with Rx. She is has a history of mixed connective tissue disorder/drug induced immunodeficiency, stable with Rx, followed by Rheumatology (Dr. Cooper), she need new referral. She is obese, working on losing weight on her own, she is not interested in weight loss Rx or seeing a dietician.   - She complains of short term term memory loss x several months. She denies head injury.   - She complains of dry patch on right anterior leg x 2-3 weeks, associated with pruritus. Denies pain, purulent drainage, or erythema. She denies spread of rash. She has tried OTC cortisone cream without resolution of symptoms.   - Patient is without any other complaints today.    denies Urinary leakage.  denies Recent falls or balance difficulty.   admits to Daily exercise or physical activity.  denies Depression, stress, anxiety, or emotional lability.   denies The need for healthcare treatment including a cane/walker, blood pressure monitoring, or regular vision/hearing tests.     Patient Care Team:  Monica Max MD as PCP - General (Family Medicine)  Monica Max MD Laperouse, Patrick A., MD as Consulting Physician (Gastroenterology)  Valerie Hein OD (Optometry)     Opioid Screening: Patient medication list reviewed, patient is taking prescription opioids, followed by Pain management. Patient is not using additional opioids than prescribed. Patient is at low risk of substance abuse based on this opioid use history.      Advance Care Planning     Date: 04/05/2024  Patient did not wish or was not able to name a surrogate decision maker or provide an Advance Care Plan.        A separate E/M code has been provided to evaluate additional complaints that the patient would like addressed during the dedicated Medicare Wellness Exam.        ----------------------------  Anemia,  unspecified  Fibromyalgia  HTN (hypertension)  Mixed hyperlipidemia  Obesity, unspecified  Sickle-cell disease without crisis  Sleep apnea, unspecified  Type 2 diabetes mellitus without complications  Vitamin D deficiency     Past Surgical History:   Procedure Laterality Date    BACK SURGERY      CARPAL TUNNEL RELEASE Bilateral      SECTION       SECTION       SECTION      COLONOSCOPY      COLONOSCOPY W/ POLYPECTOMY  2013    FOOT SURGERY Left     lasar eye       SYMPATHECTOMY         Review of patient's allergies indicates:   Allergen Reactions    Metformin Rash     Rash (skin)^  Rash (skin)^      Morphine Itching     Itchy (skin)^  Itchy (skin)^      Shellfish containing products     Vascepa [icosapent ethyl] Hives    Ibuprofen Rash     Hives (skin)^  Hives (skin)^         Outpatient Medications Marked as Taking for the 24 encounter (Office Visit) with Monica Max MD   Medication Sig Dispense Refill    albuterol (PROVENTIL) 2.5 mg /3 mL (0.083 %) nebulizer solution use one vial in nebulizer every 4 to 6 hours as needed for wheezing 75 mL 11    albuterol (PROVENTIL/VENTOLIN HFA) 90 mcg/actuation inhaler use 2 puffs by mouth every 6 hours as needed for wheezing 18 g 11    amlodipine-benazepril 5-10 mg (LOTREL) 5-10 mg per capsule TAKE ONE CAPSULE BY MOUTH DAILY 90 capsule 11    aspirin (ECOTRIN) 81 MG EC tablet TAKE ONE TABLET BY MOUTH DAILY 30 tablet 5    cetirizine (ZYRTEC) 10 MG tablet Take 10 mg by mouth every evening.      diclofenac (VOLTAREN) 50 MG EC tablet TAKE ONE TABLET BY MOUTH TWICE DAILY AS NEEDED FOR PAIN TAKE AFTER FOOD 60 tablet 5    EPINEPHrine (EPIPEN) 0.3 mg/0.3 mL AtIn Inject 1 each into the muscle daily as needed.      ergocalciferol (ERGOCALCIFEROL) 50,000 unit Cap Take 1 capsule (50,000 Units total) by mouth every 7 days. 12 capsule 3    evolocumab (REPATHA SURECLICK) 140 mg/mL PnIj Inject 1 mL (140 mg total) into the skin every 14 (fourteen) days.  2 mL 11    ezetimibe-simvastatin 10-40 mg (VYTORIN) 10-40 mg per tablet Take 1 tablet by mouth once daily.      ferrous gluconate (FERGON) 324 MG tablet TAKE ONE TABLET BY MOUTH THREE TIMES DAILY WITH FOOD 90 tablet 11    fluticasone propionate (FLONASE) 50 mcg/actuation nasal spray 2 sprays by Each Nostril route once daily.      gabapentin (NEURONTIN) 100 MG capsule Take 1 capsule (100 mg total) by mouth 3 (three) times daily. 270 capsule 3    hydroCHLOROthiazide (HYDRODIURIL) 25 MG tablet TAKE ONE TABLET BY MOUTH TWICE DAILY 180 tablet 3    HYDROcodone-acetaminophen (NORCO)  mg per tablet Take 1 tablet by mouth every 4 (four) hours.      hydrOXYchloroQUINE (PLAQUENIL) 200 mg tablet Take 1 tablet (200 mg total) by mouth 2 (two) times daily. 180 tablet 3    hydrOXYzine HCL (ATARAX) 25 MG tablet Take 25 mg by mouth every 8 (eight) hours as needed.      montelukast (SINGULAIR) 10 mg tablet Take 10 mg by mouth once daily.      mycophenolate (CELLCEPT) 500 mg Tab Take 2 tablets (1,000 mg total) by mouth daily with dinner or evening meal. 180 tablet 3    omalizumab (XOLAIR) 150 mg/mL injection Inject 300 mg into the skin every 30 days.      omeprazole (PRILOSEC) 40 MG capsule Take 1 capsule (40 mg total) by mouth once daily. Before lunch 90 capsule 3    ondansetron (ZOFRAN) 4 MG tablet Take 1 tablet (4 mg total) by mouth every 6 (six) hours. 12 tablet 0    ondansetron (ZOFRAN-ODT) 4 MG TbDL Take 1 tablet (4 mg total) by mouth every 8 (eight) hours as needed (nausea). 10 tablet 0    tiZANidine (ZANAFLEX) 4 MG tablet Take 2 tablets (8 mg total) by mouth nightly. 180 tablet 3    traZODone (DESYREL) 50 MG tablet Take 50 mg by mouth nightly.         Social History     Socioeconomic History    Marital status:    Tobacco Use    Smoking status: Never    Smokeless tobacco: Never   Substance and Sexual Activity    Alcohol use: Not Currently    Drug use: Never    Sexual activity: Yes     Social Determinants of Health      Financial Resource Strain: Low Risk  (4/5/2024)    Overall Financial Resource Strain (CARDIA)     Difficulty of Paying Living Expenses: Not very hard   Food Insecurity: No Food Insecurity (4/5/2024)    Hunger Vital Sign     Worried About Running Out of Food in the Last Year: Never true     Ran Out of Food in the Last Year: Never true   Transportation Needs: No Transportation Needs (4/5/2024)    PRAPARE - Transportation     Lack of Transportation (Medical): No     Lack of Transportation (Non-Medical): No   Physical Activity: Inactive (4/5/2024)    Exercise Vital Sign     Days of Exercise per Week: 0 days     Minutes of Exercise per Session: 0 min   Stress: Stress Concern Present (4/5/2024)    [employer] - Occupational Stress Questionnaire     Feeling of Stress : To some extent   Social Connections: Moderately Isolated (4/5/2024)    Social Connection and Isolation Panel [NHANES]     Frequency of Communication with Friends and Family: More than three times a week     Frequency of Social Gatherings with Friends and Family: More than three times a week     Attends Protestant Services: More than 4 times per year     Active Member of Clubs or Organizations: No     Attends Club or Organization Meetings: Never     Marital Status:    Housing Stability: Low Risk  (4/5/2024)    Housing Stability Vital Sign     Unable to Pay for Housing in the Last Year: No     Number of Places Lived in the Last Year: 1     Unstable Housing in the Last Year: No        Family History   Problem Relation Age of Onset    Cancer Mother     Stroke Sister     Hypertension Sister     Drug abuse Brother     Alcohol abuse Brother         Subjective:     ROS      See HPI for details    Constitutional: No fever, No chills, No fatigue.   Eye: No blurring, No visual disturbances.   Ear/Nose/Mouth/Throat: No decreased hearing, No ear pain, No nasal congestion, No sore throat.   Respiratory: No shortness of breath, No  cough, No wheezing.   Cardiovascular: No chest pain, No palpitations, No peripheral edema.   Breast: Both breasts, No lump/ mass, No pain.   Nipple discharge: None.   Gastrointestinal: No nausea, No vomiting, No diarrhea, No constipation, No abdominal pain.   Genitourinary: No dysuria, No hematuria.   Gynecologic: Negative except as documented in history of present illness.   Hematology/Lymphatics: No bruising tendency, No bleeding tendency, No swollen lymph glands.   Endocrine: No excessive thirst, No polyuria, No excessive hunger.   Musculoskeletal: Joint pain, Muscle pain, No decreased range of motion.   Integumentary: Rash, Pruritus.   Neurologic: No abnormal balance, No confusion, No headache.   Psychiatric: No anxiety, No depression, Not suicidal, No hallucinations.     All Other ROS: Negative except as stated in HPI.       Objective:     /65 (BP Location: Left arm, Patient Position: Sitting, BP Method: Large (Automatic))   Pulse (!) 58   Ht 5' (1.524 m)   Wt 71.4 kg (157 lb 6.4 oz)   SpO2 98%   BMI 30.74 kg/m²     Physical Exam    General: Alert and oriented, No acute distress.   Appearance: Obese.   Eye: Pupils are equal, round and reactive to light, Extraocular movements are intact, Normal conjunctiva.   HENT: Normocephalic, Tympanic membranes are clear, Normal hearing, Oral mucosa is moist, No pharyngeal erythema.   Throat: Pharynx ( Not edematous, No exudate ).   Neck: Supple, Non-tender, No carotid bruit, No lymphadenopathy, No thyromegaly.   Respiratory: Lungs are clear to auscultation, Respirations are non-labored, Breath sounds are equal, Symmetrical chest wall expansion, No chest wall tenderness.   Cardiovascular: Normal rate, Regular rhythm, No murmur, Good pulses equal in all extremities, No edema.   Gastrointestinal: Soft, Non-tender, Non-distended, Normal bowel sounds, No organomegaly.   Genitourinary: No costovertebral angle tenderness.   Musculoskeletal: Normal range of motion,  Normal gait.   Integumentary: Right anterior leg with 4cm x 4cm dry circular dry patch, no erythema, no purulent drainage, non-tender, consistent with dermatitis.  Neurologic: No focal deficits, Cranial Nerves II-XII are grossly intact.  Protective Sensation (w/ 10 gram monofilament):  Right: Intact  Left: Intact     Visual Inspection:  Callus -  Bilateral, left 3rd and 4th toes, and 4th and 5th toes with dry, cracked skin, consistent with tinea pedis.      Pedal Pulses:   Right: Present  Left: Present     Posterior Tibialis Pulses:   Right:Present  Left: Present   Psychiatric: Cooperative, Appropriate mood & affect, Normal judgment, Non-suicidal.   Mood and affect: Calm.   Behavior: Relaxed.       Assessment:       ICD-10-CM ICD-9-CM   1. Medicare annual wellness visit, subsequent  Z00.00 V70.0   2. Controlled type 2 diabetes mellitus with diabetic polyneuropathy, without long-term current use of insulin  E11.42 250.60     357.2   3. Hyperlipidemia associated with type 2 diabetes mellitus  E11.69 250.80    E78.5 272.4   4. Hypertension associated with diabetes  E11.59 250.80    I15.2 401.9   5. Class 1 obesity due to excess calories with serious comorbidity and body mass index (BMI) of 30.0 to 30.9 in adult  E66.09 278.00    Z68.30 V85.30   6. PVD (peripheral vascular disease)  I73.9 443.9   7. Sickle-cell trait  D57.3 282.5   8. Vitamin D deficiency  E55.9 268.9   9. MCTD (mixed connective tissue disease)  M35.1 710.8   10. Drug-induced immunodeficiency  D84.821 279.3    Z79.899 E947.9   11. Short-term memory loss  R41.3 780.93   12. Visit for screening mammogram  Z12.31 V76.12   13. Postmenopausal  Z78.0 V49.81   14. Screening for osteoporosis  Z13.820 V82.81   15. Dermatitis  L30.9 692.9   16. Tinea pedis of left foot  B35.3 110.4   17. Other amnesia  R41.3 780.93   18. Abnormal finding of blood chemistry, unspecified  R79.9 790.6        Plan:       Medicare Annual Wellness and Personalized Prevention Plan:      Fall Risk + Home Safety + Hearing Impairment + Depression Screen + Cognitive Impairment Screen + Health Risk Assessment all reviewed.          No data to display                  4/5/2024     8:46 AM 7/18/2023    11:17 AM 6/27/2023     8:51 AM 3/29/2023     2:12 PM 2/27/2023     8:42 AM 10/6/2022     1:15 PM 9/28/2022    10:06 AM   Depression Screeening PHQ2   Over the last two weeks how often have you been bothered by little interest or pleasure in doing things 0 0 0 1 0 0 0   Over the last two weeks how often have you been bothered by feeling down, depressed or hopeless 0 0 0 1 0 0 0   PHQ-2 Total Score 0 0 0 2 0 0 0         4/5/2024     9:00 AM 7/18/2023    11:00 AM 6/27/2023     8:30 AM 3/29/2023     2:15 PM 2/27/2023     8:30 AM 10/6/2022     2:15 PM 9/28/2022    10:45 AM   Fall Risk Assessment - Outpatient   Mobility Status Ambulatory Ambulatory Ambulatory Ambulatory Ambulatory Ambulatory Ambulatory   Number of falls 0 0 0 2+ 0 2+ 0   Identified as fall risk False False False True False True False             What [age] 65-69  Are you male or female?: Female  During the past four weeks, how much have you been bothered by emotional problems such as feeling anxious, depressed, irritable, sad, or downhearted and blue?: Not at all  During the past five weeks, has your physical and/or emotional health limited your social activities with family, friends, neighbors, or groups?: Quite a bit  During the past four weeks, how much bodily pain have you generally had?: Moderate pain  During the past four weeks, was someone available to help if you needed and wanted help?: Yes, as much as I wanted  During the past four weeks, what was the hardest physical activity you could do for at least two minutes?: Light  Can you get to places out of walking distance without help?  (For example, can you travel alone on buses or taxis, or drive your own car?): Yes  Can you go shopping for groceries or clothes without  someone's help?: Yes  Can you prepare your own meals?: Yes  Can you do your own housework without help?: Yes  Because of any health problems, do you need the help of another person with your personal care needs such as eating, bathing, dressing, or getting around the house?: No  Can you handle your own money without help?: Yes  During the past four weeks, how would you rate your health in general?: Fair  How have things been going for you during the past four weeks?: Good and bad parts about equal  Are you having difficulties driving your car?: No  Do you always fasten your seat belt when you are in a car?: Yes, usually  How often in the past four weeks have you been bothered by falling or dizzy when standing up?: Sometimes  How often in the past four weeks have you been bothered by sexual problems?: Never  How often in the past four weeks have you been bothered by trouble eating well?: Never  How often in the past four weeks have you been bothered by teeth or denture problems?: Never  How often in the past four weeks have you been bothered with problems using the telephone?: Never  How often in the past four weeks have you been bothered by tiredness or fatigue?: Sometimes  Have you fallen two or more times in the past year?: No  Are you afraid of falling?: No  Are you a smoker?: No  During the past four weeks, how many drinks of wine, beer, or other alcoholic beverages did you have?: No alcohol at all  Do you exercise for about 20 minutes three or more days a week?: Yes, some of the time  Have you been given any information to help you with hazards in your house that might hurt you?: No  Have you been given any information to help you with keeping track of your medications?: No  How often do you have trouble taking medicines the way you've been told to take them?: I always take them as prescribed  How confident are you that you can control and manage most of your health problems?: Very confident  What is your race?  (Check all that apply.):                  Alcohol/Tobacco Use - Stressed importance of smoking cessation and limiting alcohol intake.  CVD Risk Factors - Reviewed  Obesity/Physical Activity - Normal BMI. Encouraged daily 30 minute physical activity x 5 days per week.      Health Maintenance Topics with due status: Not Due       Topic Last Completion Date    TETANUS VACCINE 09/15/2017    Diabetes Urine Screening 06/27/2023    Lipid Panel 06/27/2023    Colorectal Cancer Screening 07/24/2023    Foot Exam 04/05/2024        Vaccinations -   Immunization History   Administered Date(s) Administered    COVID-19 MRNA, LN-S PF (MODERNA HALF 0.25 ML DOSE) 11/08/2021    COVID-19 Vaccine 04/04/2022    COVID-19, MRNA, LN-S, PF (MODERNA FULL 0.5 ML DOSE) 02/09/2021, 03/09/2021    DTP 1955, 1955, 06/03/1965, 09/15/1966, 03/01/1977, 11/19/1987    Influenza (FLUBLOK) - Quadrivalent - Recombinant - PF *Preferred* (egg allergy) 01/13/2014, 10/03/2018    Influenza - High Dose - PF (65 years and older) 11/08/2019    Influenza - Quadrivalent - High Dose - PF (65 years and older) 11/06/2020    Influenza - Quadrivalent - PF *Preferred* (6 months and older) 09/15/2017    Influenza - Trivalent - PF (ADULT) 01/13/2014, 09/24/2014, 09/15/2017, 11/08/2019, 11/06/2020, 11/17/2020    OPV 1954, 05/03/1960, 06/02/1960, 06/01/1961, 09/18/1969    Pneumococcal Conjugate - 13 Valent 04/13/2015    Pneumococcal Polysaccharide - 23 Valent 08/20/2019    Tdap 11/20/2014, 09/15/2017, 09/15/2017    Zoster 11/03/2014, 11/28/2018, 02/04/2019    Zoster Recombinant 11/28/2018, 02/04/2019          1. Medicare annual wellness visit, subsequent  - Comprehensive Metabolic Panel; Future  - TSH; Future  - Urinalysis, Reflex to Urine Culture; Future  - Will treat pending lab results. Monthly breast self exam encouraged. Diet, exercise, and 10% weight loss encouraged. Keep appointment for dental exams x q6 months as scheduled. Keep  appointment for annual eye exam as scheduled. Keep appointment with specialists as scheduled. Notify M.D. or ER if temp greater than 100.4, or any acute illness.      2. Controlled type 2 diabetes mellitus with diabetic polyneuropathy, without long-term current use of insulin  - Ambulatory referral/consult to Outpatient Case Management  - Hemoglobin A1C; Future  - Microalbumin/Creatinine Ratio, Urine; Future  - Continue Janumet as prescribed. Will titrate Rx pending results. Keep daily fasting CBG log. Keep appointment for annual eye exam as scheduled. Notify M.D. or ER if CBG >400 or <60, polyuria, polydipsia, or polyphagia.   Lab Results   Component Value Date    HGBA1C 5.1 06/27/2023      Continue   On ACE and Statin according to guidelines.  Follow ADA Diet. Avoid soda, simple sweets, and limit rice/pasta/breads/starches.  Maintain healthy weight with goal BMI <30.  Exercise 5 times per week for 30 minutes per day.  Stressed importance of daily foot exams.  Stressed importance of annual dilated eye exam.     3. Hyperlipidemia associated with type 2 diabetes mellitus  - CK; Future  - Lipid Panel; Future  - Continue VytorinTM and Repatha as prescribed. Will treat pending results.   Continue  Stressed importance of dietary modifications. Follow a low cholesterol, low saturated fat diet with less that 200mg of cholesterol a day.  Avoid fried foods and high saturated fats (high saturated fats less than 7% of calories).  Add Flax Seed/Fish Oil supplements to diet. Increase dietary fiber.  Regular exercise can reduce LDL and raise HDL. Stressed importance of physical activity 5 times per week for 30 minutes per day.      4. Hypertension associated with diabetes  - Ambulatory referral/consult to Outpatient Case Management  - NURSING COMMUNICATION: Create [facility] Account  - Hypertension Digital Medicine (HDMP) Enrollment Order  - CBC Auto Differential; Future  - BP is well controlled. Continue Lotrel, HCTZ as  prescribed. Continue followup with Cardiology as scheduled. Keep daily BP log. Notify M.D. or ER if BP >170/100 or <90/60, chest pain, palpitations, headache, SOB, temp greater than 100.4, or any acute illness.   Continue  Low Sodium Diet (DASH Diet - Less than 2 grams of sodium per day).  Monitor blood pressure daily and log. Report consistent numbers greater than 140/90.  Smoking cessation encouraged to aid in BP reduction.  Maintain healthy weight with goal BMI <30. Exercise 30 minutes per day, 5 days per week.      5. Class 1 obesity due to excess calories with serious comorbidity and body mass index (BMI) of 30.0 to 30.9 in adult  - Vitamin D; Future  Body mass index is 30.74 kg/m².  Goal BMI <30.  Exercise 5 times a week for 30 minutes per day.  Avoid soda, simple sugars, excessive rice, potatoes or bread. Limit fast foods and fried foods.  Choose complex carbs in moderation (example: green vegetables, beans, oatmeal). Eat plenty of fresh fruits and vegetables with lean meats daily.  Do not skip meals. Eat a balanced portion size.  Avoid fad diets. Consider permanent healthy life style changes.      6. PVD (peripheral vascular disease)  - Asymptomatic, continue followup with Vascular as scheduled.     7. Sickle-cell trait  - Iron and TIBC; Future    8. Vitamin D deficiency  - Vitamin D; Future    9. MCTD (mixed connective tissue disease)  - Ambulatory referral/consult to Rheumatology; Future for evaluation and treatment    10. Drug-induced immunodeficiency  - Ambulatory referral/consult to Rheumatology; Future    11. Short-term memory loss  - Ambulatory referral/consult to Neurology; Future for evaluation and treatment  - CT Head Without Contrast; Future  - Vitamin B12; Future    12. Visit for screening mammogram  - Mammo Digital Screening Bilat w/ Mehdi; Future    13. Postmenopausal  - DXA Bone Density Axial Skeleton 1 or more sites; Future    14. Screening for osteoporosis  - DXA Bone Density Axial Skeleton 1  or more sites; Future    15. Dermatitis  - Rx trial of clobetasoL (TEMOVATE) 0.05 % cream; Apply topically 2 (two) times daily as needed (rash).  Dispense: 60 g; Refill: 1  - Protein Electrophoresis, Serum, w/Interp; Future  - If no improvement, will proceed with Dermatology referral.     16. Tinea pedis of left foot  - Rx trial of ketoconazole (NIZORAL) 2 % cream; Apply topically once daily. Apply between toes with fungal infection.  Dispense: 60 g; Refill: 1; if no improvement, will proceed with Podiatry referral.     17. Other amnesia  - CT Head Without Contrast; Future  - Vitamin B12; Future    18. Abnormal finding of blood chemistry, unspecified  - Iron and TIBC; Future        Medication List with Changes/Refills   New Medications    CLOBETASOL (TEMOVATE) 0.05 % CREAM    Apply topically 2 (two) times daily as needed (rash).       Start Date: 4/5/2024  End Date: --    KETOCONAZOLE (NIZORAL) 2 % CREAM    Apply topically once daily. Apply between toes with fungal infection.       Start Date: 4/5/2024  End Date: 5/20/2024   Current Medications    ALBUTEROL (PROVENTIL) 2.5 MG /3 ML (0.083 %) NEBULIZER SOLUTION    use one vial in nebulizer every 4 to 6 hours as needed for wheezing       Start Date: 6/1/2023  End Date: --    ALBUTEROL (PROVENTIL/VENTOLIN HFA) 90 MCG/ACTUATION INHALER    use 2 puffs by mouth every 6 hours as needed for wheezing       Start Date: 7/31/2023 End Date: --    AMLODIPINE-BENAZEPRIL 5-10 MG (LOTREL) 5-10 MG PER CAPSULE    TAKE ONE CAPSULE BY MOUTH DAILY       Start Date: 6/1/2023  End Date: --    ASPIRIN (ECOTRIN) 81 MG EC TABLET    TAKE ONE TABLET BY MOUTH DAILY       Start Date: 5/25/2023 End Date: --    CETIRIZINE (ZYRTEC) 10 MG TABLET    Take 10 mg by mouth every evening.       Start Date: 10/4/2022 End Date: --    DICLOFENAC (VOLTAREN) 50 MG EC TABLET    TAKE ONE TABLET BY MOUTH TWICE DAILY AS NEEDED FOR PAIN TAKE AFTER FOOD       Start Date: 6/27/2023 End Date: --    EPINEPHRINE  (EPIPEN) 0.3 MG/0.3 ML ATIN    Inject 1 each into the muscle daily as needed.       Start Date: 4/25/2022 End Date: --    ERGOCALCIFEROL (ERGOCALCIFEROL) 50,000 UNIT CAP    Take 1 capsule (50,000 Units total) by mouth every 7 days.       Start Date: 6/27/2023 End Date: 6/26/2024    EVOLOCUMAB (REPATHA SURECLICK) 140 MG/ML PNIJ    Inject 1 mL (140 mg total) into the skin every 14 (fourteen) days.       Start Date: 10/3/2023 End Date: 10/2/2024    EZETIMIBE-SIMVASTATIN 10-40 MG (VYTORIN) 10-40 MG PER TABLET    Take 1 tablet by mouth once daily.       Start Date: 7/30/2021 End Date: --    FERROUS GLUCONATE (FERGON) 324 MG TABLET    TAKE ONE TABLET BY MOUTH THREE TIMES DAILY WITH FOOD       Start Date: 7/31/2023 End Date: --    FLUTICASONE PROPIONATE (FLONASE) 50 MCG/ACTUATION NASAL SPRAY    2 sprays by Each Nostril route once daily.       Start Date: 6/3/2022  End Date: --    GABAPENTIN (NEURONTIN) 100 MG CAPSULE    Take 1 capsule (100 mg total) by mouth 3 (three) times daily.       Start Date: 6/27/2023 End Date: 6/26/2024    HYDROCHLOROTHIAZIDE (HYDRODIURIL) 25 MG TABLET    TAKE ONE TABLET BY MOUTH TWICE DAILY       Start Date: 8/23/2023 End Date: --    HYDROCODONE-ACETAMINOPHEN (NORCO)  MG PER TABLET    Take 1 tablet by mouth every 4 (four) hours.       Start Date: 6/3/2022  End Date: --    HYDROXYCHLOROQUINE (PLAQUENIL) 200 MG TABLET    Take 1 tablet (200 mg total) by mouth 2 (two) times daily.       Start Date: 6/27/2023 End Date: --    HYDROXYZINE HCL (ATARAX) 25 MG TABLET    Take 25 mg by mouth every 8 (eight) hours as needed.       Start Date: 5/19/2022 End Date: --    MONTELUKAST (SINGULAIR) 10 MG TABLET    Take 10 mg by mouth once daily.       Start Date: 6/22/2021 End Date: --    MYCOPHENOLATE (CELLCEPT) 500 MG TAB    Take 2 tablets (1,000 mg total) by mouth daily with dinner or evening meal.       Start Date: 6/27/2023 End Date: 6/26/2024    OMALIZUMAB (XOLAIR) 150 MG/ML INJECTION    Inject 300 mg  into the skin every 30 days.       Start Date: 9/14/2021 End Date: --    OMEPRAZOLE (PRILOSEC) 40 MG CAPSULE    Take 1 capsule (40 mg total) by mouth once daily. Before lunch       Start Date: 6/27/2023 End Date: 6/26/2024    ONDANSETRON (ZOFRAN) 4 MG TABLET    Take 1 tablet (4 mg total) by mouth every 6 (six) hours.       Start Date: 3/20/2023 End Date: --    ONDANSETRON (ZOFRAN-ODT) 4 MG TBDL    Take 1 tablet (4 mg total) by mouth every 8 (eight) hours as needed (nausea).       Start Date: 11/13/2023End Date: --    SITAGLIPTAN-METFORMIN (JANUMET)  MG PER TABLET    Take 1 tablet by mouth 2 (two) times daily with meals.       Start Date: 3/29/2023 End Date: 3/28/2024    TIZANIDINE (ZANAFLEX) 4 MG TABLET    Take 2 tablets (8 mg total) by mouth nightly.       Start Date: 6/27/2023 End Date: --    TRAZODONE (DESYREL) 50 MG TABLET    Take 50 mg by mouth nightly.       Start Date: 6/3/2022  End Date: --   Discontinued Medications    BETAMETHASONE VALERATE 0.1% (VALISONE) 0.1 % CREA    Apply topically 2 (two) times daily as needed (rash).       Start Date: 3/29/2023 End Date: 4/5/2024          The patient's Health Maintenance was reviewed and the following appears to be due at this time:   Health Maintenance Due   Topic Date Due    DEXA Scan  09/13/2023    Mammogram  10/14/2023    Hemoglobin A1c  12/27/2023    Eye Exam  06/28/2024         Follow up in about 3 months (around 7/5/2024) for Dermatitis Followup.

## 2024-04-05 NOTE — PATIENT INSTRUCTIONS
Noel Luna,     If you are due for any health screening(s) below please notify me so we can arrange them to be ordered and scheduled. Most healthy patients at your age complete them, but you are free to accept or refuse.     If you can't do it, I'll definitely understand. If you can, I'd certainly appreciate it!    Tests to Keep You Healthy    Mammogram: ORDERED BUT NOT SCHEDULED  Eye Exam: Met on 6/28/2023  Colon Cancer Screening: Met on 7/24/2023  Last Blood Pressure <= 139/89 (4/5/2024): Yes  Last HbA1c < 8 (06/27/2023): Yes      Schedule your breast cancer screening today     Breast cancer is the second most common cancer in women,  and the second leading cause of death from cancer. Mammograms can detect breast cancer early, which significantly increases the chances of curing the cancer.       Our records indicate that you may be overdue for breast cancer screening. Cancer screenings save lives, so schedule yours today to stay healthy.     If you recently had a mammogram performed outside of [facility], please let your Health care team know so that they can update your health record.

## 2024-04-08 ENCOUNTER — TELEPHONE (OUTPATIENT)
Dept: FAMILY MEDICINE | Facility: CLINIC | Age: 69
End: 2024-04-08
Payer: MEDICARE

## 2024-04-08 DIAGNOSIS — E11.59 HYPERTENSION ASSOCIATED WITH DIABETES: ICD-10-CM

## 2024-04-08 DIAGNOSIS — I15.2 HYPERTENSION ASSOCIATED WITH DIABETES: ICD-10-CM

## 2024-04-08 DIAGNOSIS — E11.42 CONTROLLED TYPE 2 DIABETES MELLITUS WITH DIABETIC POLYNEUROPATHY, WITHOUT LONG-TERM CURRENT USE OF INSULIN: Primary | ICD-10-CM

## 2024-04-08 RX ORDER — SITAGLIPTIN AND METFORMIN HYDROCHLORIDE 500; 50 MG/1; MG/1
1 TABLET, FILM COATED ORAL 2 TIMES DAILY WITH MEALS
Qty: 180 TABLET | Refills: 3 | Status: SHIPPED | OUTPATIENT
Start: 2024-04-08 | End: 2025-04-08

## 2024-04-08 NOTE — TELEPHONE ENCOUNTER
----- Message from Key Foster RN sent at 4/8/2024  3:08 PM CDT -----  Regarding: Referral Request  Patient was enrolled in Care Management program today. She is interested in enrolling in the digital HTN and DM program as she states she does not have a BP monitor or Glucometer at this time. If you agree, can you please send an order for both?  Her  is enrolled in these programs so she is familiar with it.  I am also concerned with her decreased appetite. She tells me she usually skips breakfast, may eat a sandwich for lunch and often skips dinner. She is raising her 2 1/2 year old great grand daughter and I don't think she is taking good care of herself. Can she please have a diabetic nutritional consult as well?  Thank you for the referral Dr Max. I look forward to working with this patient.    Key Foster RN

## 2024-04-08 NOTE — PROGRESS NOTES
Outpatient Care Management  Initial Patient Assessment    Patient: Cheryl Solis  MRN: [id number]  Date of Service: 04/05/2024  Completed by: Key Foster RN  Referral Date: 04/05/2024  Date of Eligibility: 4/7/2024  Program: High Risk  Status: Ongoing  Effective Dates: 4/8/2024 - present  Responsible Staff: Key Foster RN        Reason for Visit   Patient presents with    OPCM Enrollment Call    Nursing Assessment       Brief Summary and next steps:  Cheryl Solis was referred by Dr Monica Max for DM II with diabetic polyneuropathy. Patient qualifies for program based on risk score of 81.5%  Active problem list, medical, surgical and social history reviewed. Active comorbidities include DM II with polyneuropathy and chronic pain. Areas of need identified by patient include education and support regarding DM II with polyneuropathy and chronic pain. SDOH completed and no needs identified.     Disability Status  Is the patient alert and oriented (person, place, time, and situation)?: Alert and oriented x 4  Hearing Difficulty or Deaf: no  Visual Difficulty or Blind: no  Visual and Hearing Needs Conclusion: No visual or hearing deficits identified  Difficulty Concentrating, Remembering or Making Decisions: yes  Concentration Management: short term memory issues  Communication Difficulty: no  Eating/Swallowing Difficulty: no  Walking or Climbing Stairs Difficulty: no  Dressing/Bathing Difficulty: no  Toileting : Independent  Continence : Continence - Not a problem  Difficulty Managing Errands Independently: no  Equipment Currently Used at Home: none  ADL Conclusion Statement: Pt is independent with most ADL's and drives  Change in Functional Status Since Onset of Current Illness/Injury: no        Spiritual Beliefs  Spiritual, Cultural Beliefs, Caodaism Practices, Values that Affect Care: no      Social History     Socioeconomic History    Marital status:    Tobacco Use    Smoking status: Never    Smokeless  tobacco: Never   Substance and Sexual Activity    Alcohol use: Not Currently    Drug use: Never    Sexual activity: Yes     Social Determinants of Health     Financial Resource Strain: Low Risk  (4/8/2024)    Overall Financial Resource Strain (CARDIA)     Difficulty of Paying Living Expenses: Not very hard   Food Insecurity: No Food Insecurity (4/8/2024)    Hunger Vital Sign     Worried About Running Out of Food in the Last Year: Never true     Ran Out of Food in the Last Year: Never true   Transportation Needs: No Transportation Needs (4/8/2024)    PRAPARE - Transportation     Lack of Transportation (Medical): No     Lack of Transportation (Non-Medical): No   Physical Activity: Inactive (4/8/2024)    Exercise Vital Sign     Days of Exercise per Week: 0 days     Minutes of Exercise per Session: 0 min   Stress: Stress Concern Present (4/8/2024)    [employer] - Occupational Stress Questionnaire     Feeling of Stress : To some extent   Social Connections: Moderately Isolated (4/8/2024)    Social Connection and Isolation Panel [NHANES]     Frequency of Communication with Friends and Family: More than three times a week     Frequency of Social Gatherings with Friends and Family: More than three times a week     Attends Scientologist Services: More than 4 times per year     Active Member of Clubs or Organizations: No     Attends Club or Organization Meetings: Never     Marital Status:    Housing Stability: Low Risk  (4/8/2024)    Housing Stability Vital Sign     Unable to Pay for Housing in the Last Year: No     Number of Places Lived in the Last Year: 1     Unstable Housing in the Last Year: No       Roles and Relationships  Primary Source of Support/Comfort: spouse  Name of Support/Comfort Primary Source: George Solis-spouse  Primary Roles/Responsibilities: caregiver for other(s)      Advance Directives (For Healthcare)  Advance Directive  (If Adv Dir status is received, view document  under Adv Dir in header or Chart Review Media tab): Patient does not have Advance Directive, declines information.  Patient Requests Assistance: Patient will do independently                 4/8/2024     3:14 PM 4/5/2024     8:46 AM 7/18/2023    11:17 AM 6/27/2023     8:51 AM 3/29/2023     2:12 PM 2/27/2023     8:42 AM 10/6/2022     1:15 PM   Depression Patient Health Questionnaire   Over the last two weeks how often have you been bothered by little interest or pleasure in doing things Not at all Not at all Not at all Not at all Several days Not at all Not at all   Over the last two weeks how often have you been bothered by feeling down, depressed or hopeless Several days Not at all Not at all Not at all Several days Not at all Not at all   PHQ-2 Total Score 1 0 0 0 2 0 0       Learning Assessment       04/08/2024 1529 [facility] (4/5/2024 - Present)   Created by Key Foster RN -  (Nurse) Status: Complete                 PRIMARY LEARNER     Primary Learner Name:  Cheryl Solis NG - 04/08/2024 1529    Relationship:  Patient NG - 04/08/2024 1529    Does the primary learner have any barriers to learning?:  No Barriers NG - 04/08/2024 1529    What is the preferred language of the primary learner?:  English NG - 04/08/2024 1529    Is an  required?:  No NG - 04/08/2024 1529    How does the primary learner prefer to learn new concepts?:  Demonstration NG - 04/08/2024 1529    How often do you need to have someone help you read instructions, pamphlets, or written material from your doctor or pharmacy?:  Sometimes NG - 04/08/2024 1529        CO-LEARNER #1     No question answered        CO-LEARNER #2     No question answered        SPECIAL TOPICS     No question answered        ANSWERED BY:     -:  Patient NG - 04/08/2024 1529        Edit History       Key Foster, RN -  (Nurse)   04/08/2024 1529

## 2024-04-11 DIAGNOSIS — E11.69 HYPERLIPIDEMIA ASSOCIATED WITH TYPE 2 DIABETES MELLITUS: ICD-10-CM

## 2024-04-11 DIAGNOSIS — E78.5 HYPERLIPIDEMIA ASSOCIATED WITH TYPE 2 DIABETES MELLITUS: ICD-10-CM

## 2024-04-15 ENCOUNTER — OUTPATIENT CASE MANAGEMENT (OUTPATIENT)
Dept: ADMINISTRATIVE | Facility: OTHER | Age: 69
End: 2024-04-15
Payer: MEDICARE

## 2024-04-15 NOTE — PROGRESS NOTES
Outpatient Care Management  Plan of Care Follow Up Visit    Patient: Cheryl Solis  MRN: [id number]  Date of Service: 04/15/2024  Completed by: Key Foster RN  Referral Date: 04/05/2024    Reason for Visit   Patient presents with    OPCM RN Follow Up Call       Brief Summary and next steps: OPCM follow up call completed. Continued education provided on DM II, Chronic Pain. Patient agrees to follow up call in 1 week, on or around 4-22-24

## 2024-04-17 ENCOUNTER — TELEPHONE (OUTPATIENT)
Dept: FAMILY MEDICINE | Facility: CLINIC | Age: 69
End: 2024-04-17
Payer: MEDICARE

## 2024-04-17 ENCOUNTER — HOSPITAL ENCOUNTER (OUTPATIENT)
Dept: RADIOLOGY | Facility: HOSPITAL | Age: 69
Discharge: HOME | End: 2024-04-17
Attending: FAMILY MEDICINE
Payer: MEDICARE

## 2024-04-17 DIAGNOSIS — D50.9 IRON DEFICIENCY ANEMIA, UNSPECIFIED IRON DEFICIENCY ANEMIA TYPE: ICD-10-CM

## 2024-04-17 DIAGNOSIS — Z13.820 SCREENING FOR OSTEOPOROSIS: ICD-10-CM

## 2024-04-17 DIAGNOSIS — Z12.31 VISIT FOR SCREENING MAMMOGRAM: ICD-10-CM

## 2024-04-17 DIAGNOSIS — R41.3 OTHER AMNESIA: ICD-10-CM

## 2024-04-17 DIAGNOSIS — R82.998 URINE WBC INCREASED: Primary | ICD-10-CM

## 2024-04-17 DIAGNOSIS — Z78.0 POSTMENOPAUSAL: ICD-10-CM

## 2024-04-17 DIAGNOSIS — D57.3 SICKLE-CELL TRAIT: ICD-10-CM

## 2024-04-17 DIAGNOSIS — R41.3 SHORT-TERM MEMORY LOSS: ICD-10-CM

## 2024-04-17 DIAGNOSIS — N18.32 STAGE 3B CHRONIC KIDNEY DISEASE: ICD-10-CM

## 2024-04-17 PROCEDURE — 77067 SCR MAMMO BI INCL CAD: CPT | Mod: TC

## 2024-04-17 PROCEDURE — 77080 DXA BONE DENSITY AXIAL: CPT | Mod: 26,,, | Performed by: RADIOLOGY

## 2024-04-17 PROCEDURE — 70450 CT HEAD/BRAIN W/O DYE: CPT | Mod: TC

## 2024-04-17 PROCEDURE — 77080 DXA BONE DENSITY AXIAL: CPT | Mod: TC

## 2024-04-17 PROCEDURE — 77067 SCR MAMMO BI INCL CAD: CPT | Mod: 26,,, | Performed by: STUDENT IN AN ORGANIZED HEALTH CARE EDUCATION/TRAINING PROGRAM

## 2024-04-17 PROCEDURE — 77063 BREAST TOMOSYNTHESIS BI: CPT | Mod: 26,,, | Performed by: STUDENT IN AN ORGANIZED HEALTH CARE EDUCATION/TRAINING PROGRAM

## 2024-04-17 RX ORDER — FERROUS GLUCONATE 324(38)MG
1 TABLET ORAL
Qty: 90 TABLET | Refills: 11 | Status: SHIPPED | OUTPATIENT
Start: 2024-04-17

## 2024-04-17 NOTE — TELEPHONE ENCOUNTER
----- Message from Monica Max MD sent at 4/17/2024  1:11 PM CDT -----  Urinalysis shows white blood cells in her urine, order to add urine culture to lab is in file, my office staff will contact the lab to add. She is in stage 3 out of 5 for chronic kidney disease, GFR: 39, normal >60, needs to increase fluid intake and avoid NSAIDs (for example, Ibuprofen, Motrin, Aleve, Advil, etc.), order for Ultrasound of her kidneys is in file, referral to Nephrologist (kidney specialist) for evaluation and treatment is in file. Cholesterol is improving, but not at goal, needs to follow low cholesterol eating plan, exercise, please forward results to her Cardiologist (Dr. Suh) for review/treatment. Diabetes is well controlled, HgA1C: 5.5%, goal <7.0%, continue current diabetes treatment plan, recheck CMP, HgA1C in 10/2024. There is no evidence of protein in urine from diabetes, recheck urine microalbumin in 04/2025. She is mildly anemic with low iron, iron: 43, normal: , most likely due to history of sickle cell trait, needs iron rich diet and Rx for iron sent to take three times daily, recheck CBC, and iron panel in 4-6 weeks, if still anemic, will proceed with Hematology referral. Serum protein electrophoresis is still pending. Remaining labs are essentially normal.

## 2024-04-17 NOTE — TELEPHONE ENCOUNTER
----- Message from Monica Max MD sent at 4/17/2024  1:33 PM CDT -----  Mammogram shows benign findings, no mammographic evidence of malignancy, routine Mammogram in 04/2025.

## 2024-04-18 ENCOUNTER — TELEPHONE (OUTPATIENT)
Dept: FAMILY MEDICINE | Facility: CLINIC | Age: 69
End: 2024-04-18
Payer: MEDICARE

## 2024-04-18 DIAGNOSIS — M81.0 OSTEOPOROSIS WITHOUT CURRENT PATHOLOGICAL FRACTURE, UNSPECIFIED OSTEOPOROSIS TYPE: Primary | ICD-10-CM

## 2024-04-18 NOTE — TELEPHONE ENCOUNTER
----- Message from Monica Max MD sent at 4/18/2024  3:17 PM CDT -----  Serum protein electrophoresis is negative for M spike.

## 2024-04-19 ENCOUNTER — TELEPHONE (OUTPATIENT)
Dept: FAMILY MEDICINE | Facility: CLINIC | Age: 69
End: 2024-04-19
Payer: MEDICARE

## 2024-04-19 NOTE — TELEPHONE ENCOUNTER
----- Message from Sean Batres sent at 4/19/2024 11:06 AM CDT -----  Type:  Patient Returning Call    Who Called:pt   Who Left Message for Patient:Yvonne     Earnest Call Back Number: [phone]  Additional Information: pt returning missed call

## 2024-04-19 NOTE — TELEPHONE ENCOUNTER
----- Message from Monica Max MD sent at 4/18/2024  5:01 PM CDT -----  DEXA scan shows osteoporosis (thinning of her bones) of her spine and increases her risk of a spine compression fracture, referral to Endocrinology for evaluation for Prolia injections every 6 months to strengthen her bones. Will need to repeat DEXA scan in 04/2025.

## 2024-04-19 NOTE — TELEPHONE ENCOUNTER
----- Message from Monica Max MD sent at 4/19/2024 10:44 AM CDT -----  Final urine culture is negative for bacterial urinary tract infection.

## 2024-04-22 ENCOUNTER — OUTPATIENT CASE MANAGEMENT (OUTPATIENT)
Dept: ADMINISTRATIVE | Facility: OTHER | Age: 69
End: 2024-04-22
Payer: MEDICARE

## 2024-04-22 NOTE — PROGRESS NOTES
Outpatient Care Management  Plan of Care Follow Up Visit    Patient: Cheryl Solis  MRN: [id number]  Date of Service: 04/22/2024  Completed by: Key Foster RN  Referral Date: 04/05/2024    Reason for Visit   Patient presents with    OPCM RN Follow Up Call       Brief Summary and next steps: OPCM follow up call completed. Continued education provided on DM II, Chronic pain. Patient agrees to follow up call on or around 5-6-24

## 2024-04-29 ENCOUNTER — TELEPHONE (OUTPATIENT)
Dept: FAMILY MEDICINE | Facility: CLINIC | Age: 69
End: 2024-04-29
Payer: MEDICARE

## 2024-04-29 ENCOUNTER — HOSPITAL ENCOUNTER (OUTPATIENT)
Dept: RADIOLOGY | Facility: HOSPITAL | Age: 69
Discharge: HOME | End: 2024-04-29
Attending: FAMILY MEDICINE
Payer: MEDICARE

## 2024-04-29 DIAGNOSIS — N18.32 STAGE 3B CHRONIC KIDNEY DISEASE: ICD-10-CM

## 2024-04-29 PROCEDURE — 76770 US EXAM ABDO BACK WALL COMP: CPT | Mod: TC

## 2024-04-29 NOTE — TELEPHONE ENCOUNTER
----- Message from Monica Max MD sent at 4/29/2024  3:53 PM CDT -----  Ultrasound renal (kidneys) is normal. No significant abnormalities identified.

## 2024-05-02 ENCOUNTER — OFFICE VISIT (OUTPATIENT)
Dept: NEPHROLOGY | Facility: CLINIC | Age: 69
End: 2024-05-02
Payer: MEDICARE

## 2024-05-02 VITALS
DIASTOLIC BLOOD PRESSURE: 80 MMHG | OXYGEN SATURATION: 99 % | TEMPERATURE: 98 F | HEIGHT: 60 IN | RESPIRATION RATE: 20 BRPM | WEIGHT: 155.19 LBS | BODY MASS INDEX: 30.47 KG/M2 | SYSTOLIC BLOOD PRESSURE: 134 MMHG | HEART RATE: 68 BPM

## 2024-05-02 DIAGNOSIS — N18.32 STAGE 3B CHRONIC KIDNEY DISEASE: Primary | ICD-10-CM

## 2024-05-02 PROCEDURE — 99204 OFFICE O/P NEW MOD 45 MIN: CPT | Mod: S$PBB,,, | Performed by: NURSE PRACTITIONER

## 2024-05-02 PROCEDURE — 99215 OFFICE O/P EST HI 40 MIN: CPT | Mod: PBBFAC | Performed by: NURSE PRACTITIONER

## 2024-05-02 PROCEDURE — 99999 PR PBB SHADOW E&M-EST. PATIENT-LVL V: CPT | Mod: PBBFAC,,, | Performed by: NURSE PRACTITIONER

## 2024-05-02 RX ORDER — NORTRIPTYLINE HYDROCHLORIDE 10 MG/1
10 CAPSULE ORAL NIGHTLY
COMMUNITY

## 2024-05-02 RX ORDER — MULTIVIT-MINERALS/FOLIC ACID 120 MCG
TABLET,CHEWABLE ORAL
COMMUNITY

## 2024-05-02 RX ORDER — AMLODIPINE AND BENAZEPRIL HYDROCHLORIDE 5; 10 MG/1; MG/1
1 CAPSULE ORAL DAILY
COMMUNITY

## 2024-05-02 RX ORDER — TIZANIDINE HYDROCHLORIDE 4 MG/1
CAPSULE, GELATIN COATED ORAL
COMMUNITY

## 2024-05-02 RX ORDER — TRAZODONE HYDROCHLORIDE 50 MG/1
50 TABLET ORAL NIGHTLY PRN
COMMUNITY

## 2024-05-02 RX ORDER — HYDROXYZINE HYDROCHLORIDE 25 MG/1
25 TABLET, FILM COATED ORAL
COMMUNITY

## 2024-05-02 RX ORDER — AZELASTINE HYDROCHLORIDE 0.5 MG/ML
1 SOLUTION/ DROPS OPHTHALMIC 2 TIMES DAILY
COMMUNITY

## 2024-05-02 NOTE — PROGRESS NOTES
[facility] New Referral Office Note    HPI  Cheryl Solis, 68 y.o. female, presents to office as a new patient for evaluation of CKD.  His HTN, mixed connective tissue disorder followed by , parents sickle cell trait, DM2, fibromyalgia.  Creatinine was normal last year however has been elevated as high as 1.4 in .  Creatinine today is 1.46.  Of note she is taking Lotrel and diclofenac.  Fibromyalgia and mixed connective tissue disorder maintained on Plaquenil.  Renal ultrasound negative for any significant abnormality.  SPEP negative for M spike.  She has no complaints today.  She reports history of nephrolithiasis in the past.  Denies any hematuria or dysuria.    Patient denies taking NSAIDs or new antibiotics. Also denies recent episode of dehydration, diarrhea, vomiting, acute illness, hospitalization, recent angiograms or exposure to IV radiocontrast.        Medical Diagnoses:   Past Medical History:   Diagnosis Date    Anemia, unspecified     Fibromyalgia     HTN (hypertension)     Mixed hyperlipidemia     Obesity, unspecified     Sickle-cell disease without crisis     Sleep apnea, unspecified     Type 2 diabetes mellitus without complications     Vitamin D deficiency      Patient Active Problem List   Diagnosis    Sickle-cell trait    Peripheral vascular disease, unspecified    MCTD (mixed connective tissue disease)    Degenerative lumbar spinal stenosis    Migraine headache    Iron deficiency anemia    Renal insufficiency    Vitamin D deficiency    Primary hypertension    Sickle cell trait    Fibromyalgia syndrome    Insomnia    Neuropathy    Drug-induced immunodeficiency    Asthma, persistent controlled    Stage 3b chronic kidney disease    Osteoporosis without current pathological fracture       Surgical History:   Past Surgical History:   Procedure Laterality Date    BACK SURGERY      CARPAL TUNNEL RELEASE Bilateral      SECTION       SECTION       SECTION       COLONOSCOPY      COLONOSCOPY W/ POLYPECTOMY  09/17/2013    FOOT SURGERY Left     lasar eye       SYMPATHECTOMY         Family History:   Family History   Problem Relation Name Age of Onset    Cancer Mother      Stroke Sister      Hypertension Sister      Drug abuse Brother      Alcohol abuse Brother         Social History:   Social History     Tobacco Use    Smoking status: Never    Smokeless tobacco: Never   Substance Use Topics    Alcohol use: Not Currently       Allergies:  Review of patient's allergies indicates:   Allergen Reactions    Metformin Rash     Rash (skin)^  Rash (skin)^      Morphine Itching     Itchy (skin)^  Itchy (skin)^      Shellfish containing products     Vascepa [icosapent ethyl] Hives    Ibuprofen Rash     Hives (skin)^  Hives (skin)^         Medications:  Current Outpatient Medications   Medication Sig Dispense Refill    albuterol (PROVENTIL) 2.5 mg /3 mL (0.083 %) nebulizer solution use one vial in nebulizer every 4 to 6 hours as needed for wheezing 75 mL 11    albuterol (PROVENTIL/VENTOLIN HFA) 90 mcg/actuation inhaler use 2 puffs by mouth every 6 hours as needed for wheezing 18 g 11    amlodipine-benazepril 5-10 mg (LOTREL) 5-10 mg per capsule Take 1 capsule by mouth once daily.      aspirin (ECOTRIN) 81 MG EC tablet TAKE ONE TABLET BY MOUTH DAILY 30 tablet 5    azelastine (OPTIVAR) 0.05 % ophthalmic solution Place 1 drop into both eyes 2 (two) times daily.      [id number]-[id number] (B COMPLEX-VITAMIN C) 20 mg-5 mg- 2 mg-75 mcg Chew 1 tab(s) chewed once a day      EPINEPHrine (EPIPEN) 0.3 mg/0.3 mL AtIn Inject 1 each into the muscle daily as needed.      ergocalciferol (ERGOCALCIFEROL) 50,000 unit Cap Take 1 capsule (50,000 Units total) by mouth every 7 days. 12 capsule 3    evolocumab (REPATHA SURECLICK) 140 mg/mL PnIj Inject 1 mL (140 mg total) into the skin every 14 (fourteen) days. 2 mL 11    ferrous gluconate (FERGON) 324 MG tablet Take 1 tablet (324 mg total) by mouth 3  (three) times daily with meals. 90 tablet 11    fluticasone propionate (FLONASE) 50 mcg/actuation nasal spray 2 sprays by Each Nostril route once daily.      gabapentin (NEURONTIN) 100 MG capsule Take 1 capsule (100 mg total) by mouth 3 (three) times daily. 270 capsule 3    hydroCHLOROthiazide (HYDRODIURIL) 25 MG tablet TAKE ONE TABLET BY MOUTH TWICE DAILY 180 tablet 3    HYDROcodone-acetaminophen (NORCO)  mg per tablet Take 1 tablet by mouth every 4 (four) hours.      hydrOXYchloroQUINE (PLAQUENIL) 200 mg tablet Take 1 tablet (200 mg total) by mouth 2 (two) times daily. 180 tablet 3    hydrOXYzine HCL (ATARAX) 25 MG tablet Take 25 mg by mouth.      JANUMET  mg per tablet Take 1 tablet by mouth 2 (two) times daily with meals. 180 tablet 3    montelukast (SINGULAIR) 10 mg tablet Take 10 mg by mouth once daily.      nortriptyline (PAMELOR) 10 MG capsule Take 10 mg by mouth every evening.      omeprazole (PRILOSEC) 40 MG capsule Take 1 capsule (40 mg total) by mouth once daily. Before lunch 90 capsule 3    ondansetron (ZOFRAN) 4 MG tablet Take 1 tablet (4 mg total) by mouth every 6 (six) hours. 12 tablet 0    tiZANidine 4 mg Cap 1 tablet as needed Orally twice a day      traZODone (DESYREL) 50 MG tablet Take 50 mg by mouth nightly as needed.      ondansetron (ZOFRAN-ODT) 4 MG TbDL Take 1 tablet (4 mg total) by mouth every 8 (eight) hours as needed (nausea). (Patient not taking: Reported on 5/2/2024) 10 tablet 0    traZODone (DESYREL) 50 MG tablet Take 50 mg by mouth nightly. (Patient not taking: Reported on 5/2/2024)       No current facility-administered medications for this visit.         Review of Systems:    Constitutional: Denies fever, fatigue, generalized weakness  Skin: Denies wounds, no rashes, no itching, no new skin lesions  Respiratory:  Denies cough, shortness of breath, or wheezing  Cardiovascular: Denies chest pain, palpitations, or swelling  Gastrointestional: Denies abdominal pain, nausea,  vomiting, diarrhea, or constipation  Genitourinary: Denies dysuria, hematuria, foamy urine, or incontinence; reports able to empty bladder  Musculoskeletal: Denies back or flank pain  Neurological: Denies headaches, dizziness, paresthesias, tremors or focal weakness      Vital Signs:  /80 (BP Location: Left arm, Patient Position: Sitting, BP Method: Medium (Manual))   Pulse 68   Temp 97.5 °F (36.4 °C) (Temporal)   Resp 20   Ht 5' (1.524 m)   Wt 70.4 kg (155 lb 3.2 oz)   SpO2 99%   BMI 30.31 kg/m²   Body mass index is 30.31 kg/m².      Physical Exam:    General: no acute distress, awake, alert  Eyes: PERRLA, conjunctiva clear, eyelids without swelling  HENT: atraumatic, oropharynx and nasal mucosa patent  Neck: supple, trache midline, full ROM, no JVD, no thyromegaly or lymphadenopathy  Respiratory: equal, unlabored, clear to auscultation A/P  Cardiovascular: RRR without murmur or rub;  Edema: none  Gastrointestinal: soft, non-tender, non-distended; positive bowel sounds; no masses to palpation; no ascites  Genitourinary: no CVA tenderness upon palpation  Musculoskeletal: ROM without new limitation or discomfort  Integumentary: warm, dry; no rashes, wounds, or skin lesions  Neurological: oriented x4, appropriate, no acute deficits; no asterixis      Labs:        Component Value Date/Time     04/17/2024 0914     11/12/2023 2122    K 3.8 04/17/2024 0914    K 3.8 11/12/2023 2122    CO2 25 04/17/2024 0914    CO2 18 (L) 11/12/2023 2122    BUN 18.7 04/17/2024 0914    BUN 10.3 11/12/2023 2122    CREATININE 1.46 (H) 04/17/2024 0914    CREATININE 0.88 11/12/2023 2122    CREATININE 0.87 06/27/2023 0937    CREATININE 0.95 03/20/2023 1058    CALCIUM 10.0 04/17/2024 0914    CALCIUM 9.6 11/12/2023 2122           Component Value Date/Time    WBC 6.51 04/17/2024 0914    WBC 14.28 (H) 11/12/2023 2122    HGB 9.8 (L) 04/17/2024 0914    HGB 12.9 11/12/2023 2122    HCT 28.1 (L) 04/17/2024 0914    HCT 37.2  11/12/2023 2122     04/17/2024 0914     11/12/2023 2122         Imaging:  Retroperitoneal US:      Impression:    1. Stage 3b chronic kidney disease    Likely secondary to nephrosclerosis diabetic nephropathy.  BP stable.  Mixed connective tissue disorder and fibromyalgia previously managed by .  Patient is trying to follow up with a rheumatologist since he moved.  DM2 managed per PCP.  Recent A1c 5.5.        Plan:  Previous JOCELYN positive in 2021.  We will recheck serologies.  SPEP negative for M spike.  Renal ultrasound also negative.  Stressed adequate fluid intake on HCTZ and ACEi.       Follow up in 1 month with labs within the week before.    Patient to call our office with any concerns prior to next appointment.    Avoid NSAIDs (Aleve, Mobic, Celebrex, Ibuprofen, Advil, Toradol and Diclofenac).  Only take tylenol occasionally if needed for aches/pains.    Educated on low sodium diet:  Avoid high salt foods (olives, pickles, smoked meats, deli meats, salted potato chips, etc.).   Do not add salt to your food at the table.   Use only small amounts of salt when cooking.    Recommended Daily Protein Intake for chronic kidney disease:  0.8 g/kg    Avoid excessive intake of high potassium foods  Bananas, oranges, watermelon, tomatoes, potatoes, or salt substitutes    Marylin Sage        This note was created with the assistance of [employer]EX[employer] voice recognition software or phone dictation. There may be transcription errors as a result of using this technology however minimal. Effort has been made to assure accuracy of transcription but any obvious errors or omissions should be clarified with the author of the document.

## 2024-05-06 ENCOUNTER — OUTPATIENT CASE MANAGEMENT (OUTPATIENT)
Dept: ADMINISTRATIVE | Facility: OTHER | Age: 69
End: 2024-05-06
Payer: MEDICARE

## 2024-05-06 NOTE — PROGRESS NOTES
Outpatient Care Management  Plan of Care Follow Up Visit    Patient: Cheryl Solis  MRN: [id number]  Date of Service: 05/06/2024  Completed by: Key Foster RN  Referral Date: 04/05/2024    Reason for Visit   Patient presents with    OPCM RN Follow Up Call       Brief Summary and next steps: OPCM follow up call completed. Continued education provided on chronic pain. Mailed education on rotator cuff injury and exercises. Patient agrees to follow up call in 3 weeks, on or around 5-27-24

## 2024-05-30 ENCOUNTER — OUTPATIENT CASE MANAGEMENT (OUTPATIENT)
Dept: ADMINISTRATIVE | Facility: OTHER | Age: 69
End: 2024-05-30
Payer: MEDICARE

## 2024-05-30 ENCOUNTER — LAB VISIT (OUTPATIENT)
Dept: LAB | Facility: HOSPITAL | Age: 69
End: 2024-05-30
Attending: NURSE PRACTITIONER
Payer: MEDICARE

## 2024-05-30 DIAGNOSIS — N18.32 STAGE 3B CHRONIC KIDNEY DISEASE: ICD-10-CM

## 2024-05-30 LAB
ALBUMIN SERPL-MCNC: 4.1 G/DL (ref 3.4–4.8)
ALBUMIN/GLOB SERPL: 1.2 RATIO (ref 1.1–2)
ALP SERPL-CCNC: 56 UNIT/L (ref 40–150)
ALT SERPL-CCNC: 12 UNIT/L (ref 0–55)
ANION GAP SERPL CALC-SCNC: 7 MEQ/L
AST SERPL-CCNC: 14 UNIT/L (ref 5–34)
BACTERIA #/AREA URNS AUTO: NORMAL /HPF
BASOPHILS # BLD AUTO: 0.04 X10(3)/MCL
BASOPHILS NFR BLD AUTO: 0.6 %
BILIRUB SERPL-MCNC: 0.4 MG/DL
BILIRUB UR QL STRIP.AUTO: NEGATIVE
BUN SERPL-MCNC: 12.1 MG/DL (ref 9.8–20.1)
CALCIUM SERPL-MCNC: 9.9 MG/DL (ref 8.4–10.2)
CHLORIDE SERPL-SCNC: 107 MMOL/L (ref 98–107)
CLARITY UR: CLEAR
CO2 SERPL-SCNC: 28 MMOL/L (ref 23–31)
COLOR UR AUTO: YELLOW
CREAT SERPL-MCNC: 1 MG/DL (ref 0.55–1.02)
CREAT UR-MCNC: 178.8 MG/DL (ref 45–106)
CREAT/UREA NIT SERPL: 12
EOSINOPHIL # BLD AUTO: 0.33 X10(3)/MCL (ref 0–0.9)
EOSINOPHIL NFR BLD AUTO: 4.9 %
ERYTHROCYTE [DISTWIDTH] IN BLOOD BY AUTOMATED COUNT: 13.8 % (ref 11.5–17)
GFR SERPLBLD CREATININE-BSD FMLA CKD-EPI: >60 ML/MIN/1.73/M2
GLOBULIN SER-MCNC: 3.5 GM/DL (ref 2.4–3.5)
GLUCOSE SERPL-MCNC: 115 MG/DL (ref 82–115)
GLUCOSE UR QL STRIP: NEGATIVE
HCT VFR BLD AUTO: 31.6 % (ref 37–47)
HGB BLD-MCNC: 11 G/DL (ref 12–16)
HGB UR QL STRIP: ABNORMAL
IMM GRANULOCYTES # BLD AUTO: 0.01 X10(3)/MCL (ref 0–0.04)
IMM GRANULOCYTES NFR BLD AUTO: 0.1 %
KETONES UR QL STRIP: NEGATIVE
LEUKOCYTE ESTERASE UR QL STRIP: NEGATIVE
LYMPHOCYTES # BLD AUTO: 2.82 X10(3)/MCL (ref 0.6–4.6)
LYMPHOCYTES NFR BLD AUTO: 42.2 %
MCH RBC QN AUTO: 29.7 PG (ref 27–31)
MCHC RBC AUTO-ENTMCNC: 34.8 G/DL (ref 33–36)
MCV RBC AUTO: 85.4 FL (ref 80–94)
MONOCYTES # BLD AUTO: 0.51 X10(3)/MCL (ref 0.1–1.3)
MONOCYTES NFR BLD AUTO: 7.6 %
NEUTROPHILS # BLD AUTO: 2.98 X10(3)/MCL (ref 2.1–9.2)
NEUTROPHILS NFR BLD AUTO: 44.6 %
NITRITE UR QL STRIP: NEGATIVE
PH UR STRIP: 5.5 [PH]
PLATELET # BLD AUTO: 306 X10(3)/MCL (ref 130–400)
PMV BLD AUTO: 9.6 FL (ref 7.4–10.4)
POTASSIUM SERPL-SCNC: 4 MMOL/L (ref 3.5–5.1)
PROT SERPL-MCNC: 7.6 GM/DL (ref 5.8–7.6)
PROT UR QL STRIP: NEGATIVE
PROT UR STRIP-MCNC: 9.1 MG/DL
PTH-INTACT SERPL-MCNC: 59.1 PG/ML (ref 8.7–77)
RBC # BLD AUTO: 3.7 X10(6)/MCL (ref 4.2–5.4)
RBC #/AREA URNS AUTO: NORMAL /HPF
SODIUM SERPL-SCNC: 142 MMOL/L (ref 136–145)
SP GR UR STRIP.AUTO: 1.02 (ref 1–1.03)
SQUAMOUS #/AREA URNS AUTO: NORMAL /HPF
URINE PROTEIN/CREATININE RATIO (OLG): 0.1
UROBILINOGEN UR STRIP-ACNC: 0.2
WBC # SPEC AUTO: 6.69 X10(3)/MCL (ref 4.5–11.5)
WBC #/AREA URNS AUTO: NORMAL /HPF

## 2024-05-30 PROCEDURE — 84156 ASSAY OF PROTEIN URINE: CPT

## 2024-05-30 PROCEDURE — 83970 ASSAY OF PARATHORMONE: CPT

## 2024-05-30 PROCEDURE — 86036 ANCA SCREEN EACH ANTIBODY: CPT

## 2024-05-30 PROCEDURE — 80053 COMPREHEN METABOLIC PANEL: CPT

## 2024-05-30 PROCEDURE — 82570 ASSAY OF URINE CREATININE: CPT

## 2024-05-30 PROCEDURE — 81003 URINALYSIS AUTO W/O SCOPE: CPT

## 2024-05-30 PROCEDURE — 86039 ANTINUCLEAR ANTIBODIES (ANA): CPT

## 2024-05-30 PROCEDURE — 36415 COLL VENOUS BLD VENIPUNCTURE: CPT

## 2024-05-30 PROCEDURE — 86160 COMPLEMENT ANTIGEN: CPT | Mod: 59

## 2024-05-30 PROCEDURE — 86225 DNA ANTIBODY NATIVE: CPT

## 2024-05-30 PROCEDURE — 85025 COMPLETE CBC W/AUTO DIFF WBC: CPT

## 2024-05-30 NOTE — PROGRESS NOTES
Outpatient Care Management  Plan of Care Follow Up Visit    Patient: Cheryl Solis  MRN: [id number]  Date of Service: 05/30/2024  Completed by: Key Foster RN  Referral Date: 04/05/2024    Reason for Visit   Patient presents with    OPCM RN Follow Up Call       Brief Summary and next steps: OPCM follow up call completed. Continued education provided on DM II, Chronic pain. Patient agrees with follow up call on or around 6-13-24

## 2024-06-01 LAB
C-ANCA TITR SER IF: NEGATIVE {TITER}
P-ANCA SER QL IF: NEGATIVE

## 2024-06-02 LAB
C3 SERPL-MCNC: 139 MG/DL
C4 SERPL-MCNC: 35 MG/DL
NUCLEAR IGG SER QL IA: NORMAL
RHEUMATOID FACT SERPL-ACNC: <10 IU/ML

## 2024-06-03 ENCOUNTER — HOSPITAL ENCOUNTER (OUTPATIENT)
Dept: RADIOLOGY | Facility: HOSPITAL | Age: 69
Discharge: HOME | End: 2024-06-03
Attending: PSYCHIATRY & NEUROLOGY
Payer: MEDICARE

## 2024-06-03 DIAGNOSIS — M16.0 BILATERAL HIP JOINT ARTHRITIS: ICD-10-CM

## 2024-06-03 LAB
ANTINUCLEAR ANTIBODY SCREEN (OHS): POSITIVE
CENTROMERE QUANT (OHS): 0.8 U/ML
DSDNA AB QUANT (OHS): 1.6 IU/ML
JO-1 AB QUANT (OHS): <0.3 U/ML
RNP70 AB QUANT (OHS): 1.2 U/ML
SCL-70S AB QUANT (OHS): 0.9 U/ML
SMITH AB QUANT (OHS): <0.7 U/ML
SSA(RO) AB QUANT (OHS): 0.8 U/ML
SSB(LA) AB QUANT (OHS): 0.4 U/ML
U1RNP AB QUANT (OHS): 17 U/ML

## 2024-06-03 PROCEDURE — 73521 X-RAY EXAM HIPS BI 2 VIEWS: CPT | Mod: TC

## 2024-06-05 ENCOUNTER — OFFICE VISIT (OUTPATIENT)
Dept: NEPHROLOGY | Facility: CLINIC | Age: 69
End: 2024-06-05
Payer: MEDICARE

## 2024-06-05 VITALS
TEMPERATURE: 98 F | OXYGEN SATURATION: 98 % | HEART RATE: 64 BPM | DIASTOLIC BLOOD PRESSURE: 76 MMHG | BODY MASS INDEX: 30.95 KG/M2 | SYSTOLIC BLOOD PRESSURE: 128 MMHG | WEIGHT: 157.63 LBS | RESPIRATION RATE: 20 BRPM | HEIGHT: 60 IN

## 2024-06-05 DIAGNOSIS — I10 PRIMARY HYPERTENSION: ICD-10-CM

## 2024-06-05 DIAGNOSIS — M35.1 MCTD (MIXED CONNECTIVE TISSUE DISEASE): ICD-10-CM

## 2024-06-05 DIAGNOSIS — Z79.899 DRUG-INDUCED IMMUNODEFICIENCY: ICD-10-CM

## 2024-06-05 DIAGNOSIS — D84.821 DRUG-INDUCED IMMUNODEFICIENCY: ICD-10-CM

## 2024-06-05 DIAGNOSIS — N18.2 CKD (CHRONIC KIDNEY DISEASE) STAGE 2, GFR 60-89 ML/MIN: Primary | ICD-10-CM

## 2024-06-05 PROCEDURE — 99215 OFFICE O/P EST HI 40 MIN: CPT | Mod: PBBFAC

## 2024-06-05 PROCEDURE — 99214 OFFICE O/P EST MOD 30 MIN: CPT | Mod: S$PBB,,,

## 2024-06-05 PROCEDURE — 99999 PR PBB SHADOW E&M-EST. PATIENT-LVL V: CPT | Mod: PBBFAC,,,

## 2024-06-05 RX ORDER — ICOSAPENT ETHYL 1 G/1
2 CAPSULE ORAL 2 TIMES DAILY
COMMUNITY
Start: 2024-05-24

## 2024-06-05 NOTE — PROGRESS NOTES
[facility] [place] Ambulatory Office Note    HPI  Cheryl Solis, 68 y.o. female, presents to for follow up of FAYE on CKD.  History of HTN, mixed connective tissue disorder followed by , sickle cell trait, DM2, fibromyalgia.  Creatinine was normal last year however has been elevated as high as 1.4 in 2022.  Creatinine upon initial visit was 1.46.  She was taking diclofenac.  Fibromyalgia and mixed connective tissue disorder maintained on Plaquenil.  Renal ultrasound negative for any significant abnormality.  SPEP negative for M spike.  She has no complaints today.  She reports history of nephrolithiasis in the past.  Denies any hematuria or dysuria.    She is discontinue diclofenac since last visit.    Patient denies taking NSAIDs or new antibiotics. Also denies recent episode of dehydration, diarrhea, vomiting, acute illness, hospitalization, recent angiograms or exposure to IV radiocontrast.        Medical Diagnoses:   Past Medical History:   Diagnosis Date    Anemia, unspecified     Fibromyalgia     HTN (hypertension)     Mixed hyperlipidemia     Obesity, unspecified     Sickle-cell disease without crisis     Sleep apnea, unspecified     Type 2 diabetes mellitus without complications     Vitamin D deficiency      Patient Active Problem List   Diagnosis    Sickle-cell trait    Peripheral vascular disease, unspecified    MCTD (mixed connective tissue disease)    Degenerative lumbar spinal stenosis    Migraine headache    Iron deficiency anemia    Renal insufficiency    Vitamin D deficiency    Primary hypertension    Sickle cell trait    Fibromyalgia syndrome    Insomnia    Neuropathy    Drug-induced immunodeficiency    Asthma, persistent controlled    Stage 3b chronic kidney disease    Osteoporosis without current pathological fracture    CKD (chronic kidney disease) stage 2, GFR 60-89 ml/min       Surgical History:   Past Surgical History:   Procedure Laterality Date    BACK SURGERY      CARPAL TUNNEL  RELEASE Bilateral      SECTION       SECTION       SECTION      COLONOSCOPY      COLONOSCOPY W/ POLYPECTOMY  2013    FOOT SURGERY Left     lasar eye       SYMPATHECTOMY         Family History:   Family History   Problem Relation Name Age of Onset    Cancer Mother      Stroke Sister      Hypertension Sister      Drug abuse Brother      Alcohol abuse Brother         Social History:   Social History     Tobacco Use    Smoking status: Never     Passive exposure: Never    Smokeless tobacco: Never   Substance Use Topics    Alcohol use: Not Currently       Allergies:  Review of patient's allergies indicates:   Allergen Reactions    Metformin Rash     Rash (skin)^  Rash (skin)^      Morphine Itching     Itchy (skin)^  Itchy (skin)^      Shellfish containing products     Vascepa [icosapent ethyl] Hives    Ibuprofen Rash     Hives (skin)^  Hives (skin)^         Medications:  Outpatient Medications Marked as Taking for the 24 encounter (Office Visit) with Eder Rivera FNP   Medication Sig Dispense Refill    albuterol (PROVENTIL) 2.5 mg /3 mL (0.083 %) nebulizer solution use one vial in nebulizer every 4 to 6 hours as needed for wheezing 75 mL 11    albuterol (PROVENTIL/VENTOLIN HFA) 90 mcg/actuation inhaler use 2 puffs by mouth every 6 hours as needed for wheezing 18 g 11    amlodipine-benazepril 5-10 mg (LOTREL) 5-10 mg per capsule Take 1 capsule by mouth once daily.      aspirin (ECOTRIN) 81 MG EC tablet TAKE ONE TABLET BY MOUTH DAILY 30 tablet 5    azelastine (OPTIVAR) 0.05 % ophthalmic solution Place 1 drop into both eyes 2 (two) times daily.      [id number]-M (B COMPLEX-VITAMIN C) 20 mg-5 mg- 2 mg-75 mcg Chew 1 tab(s) chewed once a day      EPINEPHrine (EPIPEN) 0.3 mg/0.3 mL AtIn Inject 1 each into the muscle daily as needed.      ergocalciferol (ERGOCALCIFEROL) 50,000 unit Cap Take 1 capsule (50,000 Units total) by mouth every 7 days. 12 capsule 3    evolocumab  (REPATHA SURECLICK) 140 mg/mL PnIj Inject 1 mL (140 mg total) into the skin every 14 (fourteen) days. 2 mL 11    ferrous gluconate (FERGON) 324 MG tablet Take 1 tablet (324 mg total) by mouth 3 (three) times daily with meals. 90 tablet 11    fluticasone propionate (FLONASE) 50 mcg/actuation nasal spray 2 sprays by Each Nostril route once daily.      gabapentin (NEURONTIN) 100 MG capsule Take 1 capsule (100 mg total) by mouth 3 (three) times daily. 270 capsule 3    hydroCHLOROthiazide (HYDRODIURIL) 25 MG tablet TAKE ONE TABLET BY MOUTH TWICE DAILY 180 tablet 3    HYDROcodone-acetaminophen (NORCO)  mg per tablet Take 1 tablet by mouth every 4 (four) hours.      hydrOXYchloroQUINE (PLAQUENIL) 200 mg tablet Take 1 tablet (200 mg total) by mouth 2 (two) times daily. 180 tablet 3    hydrOXYzine HCL (ATARAX) 25 MG tablet Take 25 mg by mouth.      icosapent ethyL (VASCEPA) 1 gram Cap Take 2 capsules by mouth 2 (two) times daily.      JANUMET  mg per tablet Take 1 tablet by mouth 2 (two) times daily with meals. 180 tablet 3    montelukast (SINGULAIR) 10 mg tablet Take 10 mg by mouth once daily.      nortriptyline (PAMELOR) 10 MG capsule Take 10 mg by mouth every evening.      omeprazole (PRILOSEC) 40 MG capsule Take 1 capsule (40 mg total) by mouth once daily. Before lunch 90 capsule 3    ondansetron (ZOFRAN) 4 MG tablet Take 1 tablet (4 mg total) by mouth every 6 (six) hours. 12 tablet 0    tiZANidine 4 mg Cap 1 tablet as needed Orally twice a day      traZODone (DESYREL) 50 MG tablet Take 50 mg by mouth nightly as needed.           Review of Systems:    Constitutional: Denies fever, fatigue, generalized weakness  Skin: Denies wounds, no rashes, no itching, no new skin lesions  Respiratory:  Denies cough, shortness of breath, or wheezing  Cardiovascular: Denies chest pain, palpitations, or swelling  Gastrointestional: Denies abdominal pain, nausea, vomiting, diarrhea, or constipation  Genitourinary: Denies  dysuria, hematuria, foamy urine, or incontinence; reports able to empty bladder  Musculoskeletal: Denies back or flank pain  Neurological: Denies headaches, dizziness, paresthesias, tremors or focal weakness      Vital Signs:  /76 (BP Location: Left arm, Patient Position: Sitting)   Pulse 64   Temp 98.3 °F (36.8 °C) (Oral)   Resp 20   Ht 5' (1.524 m)   Wt 71.5 kg (157 lb 9.6 oz)   SpO2 98%   BMI 30.78 kg/m²   Body mass index is 30.78 kg/m².      Physical Exam:    General: no acute distress, awake, alert  Eyes: PERRLA, conjunctiva clear, eyelids without swelling  HENT: atraumatic, oropharynx and nasal mucosa patent  Neck: supple, trache midline, full ROM, no JVD  Respiratory: equal, unlabored, clear to auscultation A/P  Cardiovascular: RRR without murmur or rub;  Edema: none  Gastrointestinal: soft, non-tender, non-distended  Musculoskeletal: ROM without new limitation or discomfort  Integumentary: warm, dry; no rashes, wounds, or skin lesions  Neurological: oriented x4, appropriate, no acute deficits; no asterixis      Labs:        Component Value Date/Time     05/30/2024 0751     04/17/2024 0914    K 4.0 05/30/2024 0751    K 3.8 04/17/2024 0914     05/30/2024 0751     (H) 04/17/2024 0914    CO2 28 05/30/2024 0751    CO2 25 04/17/2024 0914    BUN 12.1 05/30/2024 0751    BUN 18.7 04/17/2024 0914    CREATININE 1.00 05/30/2024 0751    CREATININE 1.46 (H) 04/17/2024 0914    CREATININE 0.88 11/12/2023 2122    CREATININE 0.87 06/27/2023 0937    CALCIUM 9.9 05/30/2024 0751    CALCIUM 10.0 04/17/2024 0914    PTH 59.1 05/30/2024 0751           Component Value Date/Time    WBC 6.69 05/30/2024 0751    WBC 6.51 04/17/2024 0914    HGB 11.0 (L) 05/30/2024 0751    HGB 9.8 (L) 04/17/2024 0914    HCT 31.6 (L) 05/30/2024 0751    HCT 28.1 (L) 04/17/2024 0914     05/30/2024 0751     04/17/2024 0914         Imaging:  Retroperitoneal US:    Impression:     No significant abnormalities  identified     Date:                                            04/29/2024  Impression:    1. CKD (chronic kidney disease) stage 2, GFR 60-89 ml/min    2. Primary hypertension    3. MCTD (mixed connective tissue disease)    4. Drug-induced immunodeficiency    FAYE resolved with discontinuation of NSAID  Baseline CKD Likely secondary to nephrosclerosis   No significant proteinuria   BP stable.  Mixed connective tissue disorder and fibromyalgia previously managed by .  She has a follow up with him in July.  Maintained on Plaquenil  DM2 managed per PCP.  Recent A1c 5.5.    Positive JOCELYN; urine sediment not suggestive of any lupus involvement    Plan:    Send most recent labs including both JOCELYN results to Dr. flores    Adequate fluid intake  Refrain from NSAIDs  Blood pressure control  Follow up in 6 month with labs within the week before.    Patient to call our office with any concerns prior to next appointment.    Avoid NSAIDs (Aleve, Mobic, Celebrex, Ibuprofen, Advil, Toradol and Diclofenac).  Only take tylenol occasionally if needed for aches/pains.    Educated on low sodium diet:  Avoid high salt foods (olives, pickles, smoked meats, deli meats, salted potato chips, etc.).   Do not add salt to your food at the table.   Use only small amounts of salt when cooking.        Eder DAVISON        This note was created with the assistance of [employer] voice recognition software or phone dictation. There may be transcription errors as a result of using this technology however minimal. Effort has been made to assure accuracy of transcription but any obvious errors or omissions should be clarified with the author of the document.

## 2024-06-05 NOTE — PATIENT INSTRUCTIONS
Blood pressure goal: consistently less than 130/85    AC1 goal for diabetics: less than 7%    Avoid NSAIDs and COX2 inhibitors: Advil (ibuprofen), Aleve (naproxen), Mobic (meloxicam), Celebrex (celecoxib), Toradol (ketorolac) and Diclofenac (voltaren), Indomethacin (indocin).  Only take tylenol (acetaminophen) occasionally if needed for aches/pains.    Recommend low sodium diet:  Less than 2,000 mg per day  Avoid high salt foods (olives, pickles, smoked meats, deli meats, salted potato chips, fast food, etc.).   Do not add salt to your food at the table.   Use only small amounts of salt when cooking.    Avoid alcohol and soda. Limit tea and coffee.     Stay well hydrated with water      Call our office with concerns prior to next appointment    For more education on kidney disease you can visit:  [email or web address]

## 2024-06-19 ENCOUNTER — OUTPATIENT CASE MANAGEMENT (OUTPATIENT)
Dept: ADMINISTRATIVE | Facility: OTHER | Age: 69
End: 2024-06-19
Payer: MEDICARE

## 2024-06-19 NOTE — PROGRESS NOTES
Outpatient Care Management  Plan of Care Follow Up Visit    Patient: Cheryl Solis  MRN: [id number]  Date of Service: 06/19/2024  Completed by: Key Foster RN  Referral Date: 04/05/2024    Reason for Visit   Patient presents with    OPCM RN Follow Up Call       Brief Summary and next steps: OPCM follow up call completed. Continued education provided on DM II, Chronic pain. Patient agrees to follow up call in 3 weeks, on or around 7-10-24

## 2024-07-08 LAB
LEFT EYE DM RETINOPATHY: NEGATIVE
RIGHT EYE DM RETINOPATHY: NEGATIVE

## 2024-07-16 ENCOUNTER — OUTPATIENT CASE MANAGEMENT (OUTPATIENT)
Dept: ADMINISTRATIVE | Facility: OTHER | Age: 69
End: 2024-07-16
Payer: MEDICARE

## 2024-07-16 NOTE — PROGRESS NOTES
Outpatient Care Management  Plan of Care Follow Up Visit    Patient: Cheryl Solis  MRN: [id number]  Date of Service: 07/16/2024  Completed by: Key Foster RN  Referral Date: 04/05/2024    Reason for Visit   Patient presents with    OPCM RN Follow Up Call       Brief Summary and next steps: OPCM follow up call completed. Continued education provided on chronic pain. Patient agrees to follow up call in 3 weeks, on or around 8-6-24

## 2024-07-22 ENCOUNTER — TELEPHONE (OUTPATIENT)
Dept: FAMILY MEDICINE | Facility: CLINIC | Age: 69
End: 2024-07-22
Payer: MEDICARE

## 2024-07-29 ENCOUNTER — TELEPHONE (OUTPATIENT)
Dept: FAMILY MEDICINE | Facility: CLINIC | Age: 69
End: 2024-07-29
Payer: MEDICARE

## 2024-07-29 NOTE — TELEPHONE ENCOUNTER
Are there any outstanding tasks in the patients's chart (ex.labs,MM,etc  no  Do we have outstanding/pending referrals  no  Has the patient been seen in and ER,UCC, or been admitted since last visit  no  Has the patient seen any other health care provider(doctors) since last visit  no  Has the patient had any bloodwork or x-rays done since last visit  no

## 2024-08-01 ENCOUNTER — DOCUMENTATION ONLY (OUTPATIENT)
Facility: CLINIC | Age: 69
End: 2024-08-01
Payer: MEDICARE

## 2024-08-01 ENCOUNTER — OUTPATIENT CASE MANAGEMENT (OUTPATIENT)
Dept: ADMINISTRATIVE | Facility: OTHER | Age: 69
End: 2024-08-01
Payer: MEDICARE

## 2024-08-01 NOTE — PROGRESS NOTES
Outpatient Care Management  Plan of Care Follow Up Visit    Patient: Cheryl Solis  MRN: [id number]  Date of Service: 08/01/2024  Completed by: Key Foster RN  Referral Date: 04/05/2024    Reason for Visit   Patient presents with    OPCM RN Follow Up Call    Case Closure     OPCM graduation. Message sent to PCP and office staff notifying we are trying to obtain a copy of the DM eye exam from Dr Mendoza's office to close this care gap

## 2024-08-02 ENCOUNTER — OFFICE VISIT (OUTPATIENT)
Dept: FAMILY MEDICINE | Facility: CLINIC | Age: 69
End: 2024-08-02
Payer: MEDICARE

## 2024-08-02 VITALS
HEIGHT: 60 IN | WEIGHT: 156 LBS | RESPIRATION RATE: 18 BRPM | TEMPERATURE: 99 F | BODY MASS INDEX: 30.63 KG/M2 | DIASTOLIC BLOOD PRESSURE: 86 MMHG | HEART RATE: 57 BPM | SYSTOLIC BLOOD PRESSURE: 139 MMHG | OXYGEN SATURATION: 99 %

## 2024-08-02 DIAGNOSIS — L29.9 PRURITUS: Primary | ICD-10-CM

## 2024-08-02 DIAGNOSIS — E11.42 CONTROLLED TYPE 2 DIABETES MELLITUS WITH DIABETIC POLYNEUROPATHY, WITHOUT LONG-TERM CURRENT USE OF INSULIN: ICD-10-CM

## 2024-08-02 DIAGNOSIS — R63.0 POOR APPETITE: ICD-10-CM

## 2024-08-02 DIAGNOSIS — E11.69 HYPERLIPIDEMIA ASSOCIATED WITH TYPE 2 DIABETES MELLITUS: ICD-10-CM

## 2024-08-02 DIAGNOSIS — E78.5 HYPERLIPIDEMIA ASSOCIATED WITH TYPE 2 DIABETES MELLITUS: ICD-10-CM

## 2024-08-02 DIAGNOSIS — I10 PRIMARY HYPERTENSION: ICD-10-CM

## 2024-08-02 PROBLEM — J41.0 SIMPLE CHRONIC BRONCHITIS: Status: ACTIVE | Noted: 2024-08-02

## 2024-08-02 PROCEDURE — G2211 COMPLEX E/M VISIT ADD ON: HCPCS | Mod: ,,,

## 2024-08-02 PROCEDURE — 99213 OFFICE O/P EST LOW 20 MIN: CPT | Mod: ,,,

## 2024-08-02 RX ORDER — INSULIN PUMP SYRINGE, 3 ML
EACH MISCELLANEOUS
Qty: 1 EACH | Refills: 0 | Status: SHIPPED | OUTPATIENT
Start: 2024-08-02

## 2024-08-02 RX ORDER — MEGESTROL ACETATE 40 MG/1
40 TABLET ORAL DAILY
Qty: 30 TABLET | Refills: 0 | Status: CANCELLED | OUTPATIENT
Start: 2024-08-02 | End: 2025-08-02

## 2024-08-02 RX ORDER — LANCETS
EACH MISCELLANEOUS
Qty: 100 EACH | Refills: 11 | Status: SHIPPED | OUTPATIENT
Start: 2024-08-02

## 2024-08-02 NOTE — PROGRESS NOTES
Patient ID: [id number]     Chief Complaint: Dermatitis, DM, HLD follow up    HPI:     Cheryl Solis is a 69 y.o. female here today for a follow up for dermatitis. Flat dark area located on right shin. Skin lesion started as a small red bump and has progressed to a dark flat lesion. She reports itching and pain at times. Not scaly, not peeling. Since last visit it has not changed or improved. She denies trauma or fall.  DM II is controlled with Rx, no adverse Rx side effects, asymptomatic, fasting CBGs have been 's, Eye exam completed two months ago with [facility]. She denies polyuria, denies polydipsia, denies polydipsia.   HTN is controlled with Rx, no side effects, asymptomatic, she would like to be enrolled in digital medicine program. Hypercholesterolemia is not well controlled. She reports compliance with taking the medication daily but admits to inconsistency with her diet. Denies medication side effects. She reports a poor appetite for the past 2 years. Weight is stable. She denies dysphagia, denies fatigue, palpitations, chest pan headache.      Past Medical History:   Diagnosis Date    Anemia, unspecified     Fibromyalgia     HTN (hypertension)     Mixed hyperlipidemia     Obesity, unspecified     Sickle-cell disease without crisis     Sleep apnea, unspecified     Type 2 diabetes mellitus without complications     Vitamin D deficiency         Past Surgical History:   Procedure Laterality Date    BACK SURGERY      CARPAL TUNNEL RELEASE Bilateral      SECTION       SECTION       SECTION      COLONOSCOPY      COLONOSCOPY W/ POLYPECTOMY  2013    FOOT SURGERY Left     lasar eye       SYMPATHECTOMY          Social History     Tobacco Use    Smoking status: Never     Passive exposure: Never    Smokeless tobacco: Never   Substance and Sexual Activity    Alcohol use: Not Currently    Drug use: Never    Sexual activity: Yes        Current Outpatient Medications   Medication  Instructions    albuterol (PROVENTIL) 2.5 mg /3 mL (0.083 %) nebulizer solution use one vial in nebulizer every 4 to 6 hours as needed for wheezing    albuterol (PROVENTIL/VENTOLIN HFA) 90 mcg/actuation inhaler use 2 puffs by mouth every 6 hours as needed for wheezing    amlodipine-benazepril 5-10 mg (LOTREL) 5-10 mg per capsule 1 capsule, Oral, Daily    aspirin (ECOTRIN) 81 MG EC tablet TAKE ONE TABLET BY MOUTH DAILY    azelastine (OPTIVAR) 0.05 % ophthalmic solution 1 drop, Both Eyes, 2 times daily    [id number] (B COMPLEX-VITAMIN C) 20 mg-5 mg- 2 mg-75 mcg Chew 1 tab(s) chewed once a day    blood sugar diagnostic Strp To check BG daily, to use with insurance preferred meter    blood-glucose meter kit To check BG daily, to use with insurance preferred meter    EPINEPHrine (EPIPEN) 0.3 mg/0.3 mL AtIn 1 each, Intramuscular, Daily PRN    evolocumab (REPATHA SURECLICK) 140 mg, Subcutaneous, Every 14 days    ferrous gluconate (FERGON) 324 mg, Oral, 3 times daily with meals    fluticasone propionate (FLONASE) 50 mcg/actuation nasal spray 2 sprays, Each Nostril, Daily    gabapentin (NEURONTIN) 100 mg, Oral, 3 times daily    hydroCHLOROthiazide (HYDRODIURIL) 25 MG tablet TAKE ONE TABLET BY MOUTH TWICE DAILY    HYDROcodone-acetaminophen (NORCO)  mg per tablet 1 tablet, Oral, Every 4 hours    hydroxychloroquine (PLAQUENIL) 200 mg, Oral, 2 times daily    hydrOXYzine HCL (ATARAX) 25 mg, Oral    icosapent ethyL (VASCEPA) 1 gram Cap 2 capsules, Oral, 2 times daily    JANUMET  mg per tablet 1 tablet, Oral, 2 times daily with meals    lancets Misc To check BG daily, to use with insurance preferred meter    montelukast (SINGULAIR) 10 mg, Oral, Daily    nortriptyline (PAMELOR) 10 mg, Oral, Nightly    omeprazole (PRILOSEC) 40 mg, Oral, Daily, Before lunch    ondansetron (ZOFRAN) 4 mg, Oral, Every 6 hours    tiZANidine 4 mg Cap 1 tablet as needed Orally twice a day    traZODone (DESYREL) 50 mg, Oral,  Nightly PRN       Review of patient's allergies indicates:   Allergen Reactions    Metformin Rash     Rash (skin)^  Rash (skin)^      Morphine Itching     Itchy (skin)^  Itchy (skin)^      Shellfish containing products     Vascepa [icosapent ethyl] Hives    Ibuprofen Rash     Hives (skin)^  Hives (skin)^          Patient Care Team:  Monica Max MD as PCP - General (Family Medicine)  Monica Max MD [place], Patrick A., MD as Consulting Physician (Gastroenterology)  Valerie Hein OD (Optometry)  Nilson Jerome MD as Consulting Physician (Ophthalmology)     Subjective:     Review of Systems    Constitutional: Denies Change in appetite. Denies Chills. Denies Fever. Denies Night sweats.  Eye: Denies Blurred vision. Denies Discharge. Denies Eye pain.  ENT: Denies Decreased hearing. Denies Sore throat. Denies Swollen glands.  Respiratory: Denies Cough. Denies Shortness of breath. Denies Shortness of breath with exertion. Denies Wheezing.  Cardiovascular: Denies Chest pain at rest. Denies Chest pain with exertion. Denies Irregular heartbeat. Denies Palpitations.  Gastrointestinal: Reports decreased appetite, eating 1-2 small meals daily. Denies Abdominal pain. Denies Diarrhea. Denies Nausea. Denies Vomiting.   Genitourinary: Denies Dysuria. Denies Urinary frequency. Denies Urinary urgency. Denies Blood in urine.  Endocrine: Denies Cold intolerance. Denies Excessive thirst. Denies Heat intolerance. Denies Weight loss. Denies Weight gain.  Musculoskeletal: Denies Painful joints. Denies Weakness.  Integumentary: Rash right shin. Reports itching and pain at site. Denies Dry skin.  Neurologic: Denies Dizziness. Denies Fainting. Denies Headache.  Psychiatric:  Denies Depression. Denies Anxiety. Denies Suicidal/Homicidal ideations.     12 point review of systems conducted, negative except as stated in the history of present illness. See HPI for details.    Objective:     Visit Vitals  /86  (BP Location: Right arm, Patient Position: Sitting, BP Method: Large (Automatic))   Pulse (!) 57   Temp 98.6 °F (37 °C) (Oral)   Resp 18   Ht 5' (1.524 m)   Wt 70.8 kg (156 lb)   SpO2 99%   BMI 30.47 kg/m²       Physical Exam  Vitals reviewed.     General: Alert and oriented, No acute distress.   Eye: Pupils are equal, round and reactive to light, Normal conjunctiva.   HENT: Normocephalic, Normal hearing, Oral mucosa is moist, No pharyngeal erythema, No exudate.   Neck: Supple, Non-tender, No lymphadenopathy.   Respiratory: Lungs are clear to auscultation, Respirations are non-labored, Breath sounds are equal, Symmetrical chest wall expansion, No chest wall tenderness.   Cardiovascular: Normal rate, Regular rhythm, No murmur, Good pulses equal in all extremities, No edema.   Gastrointestinal: Soft, Non-tender, Non-distended, Normal bowel sounds, No organomegaly, No diarrhea, No constipation.   Genitourinary: No costovertebral angle tenderness.   Musculoskeletal: Normal range of motion, Normal gait.   Integumentary:    Neurologic: No focal deficits, Cranial Nerves II-XII are grossly intact.   Psychiatric: Cooperative, Appropriate mood & affect, Normal judgment, No-suicidal/No homicidal ideations  Mood and affect: Calm.   Behavior: Relaxed.       Assessment:       ICD-10-CM ICD-9-CM   1. Pruritus  L29.9 698.9   2. Controlled type 2 diabetes mellitus with diabetic polyneuropathy, without long-term current use of insulin  E11.42 250.60     357.2   3. Primary hypertension  I10 401.9   4. Hyperlipidemia associated with type 2 diabetes mellitus  E11.69 250.80    E78.5 272.4   5. Poor appetite  R63.0 783.0          Plan:     1. Pruritus  Assessment & Plan:  Continue ointment as prescribed.  Avoid scratching or manipulating area as this may cause infection.    Orders:  -     Ambulatory referral/consult to Dermatology; Future; Expected date: 08/09/2024    2. Controlled type 2 diabetes mellitus with diabetic polyneuropathy,  without long-term current use of insulin  Assessment & Plan:  Continue JANUMET  mg per tablet as prescribed.  On ACE and Statin according to guidelines.  Monitor FBG daily and log results for review at next visit.  Follow ADA Diet. Avoid soda, simple sweets, and limit rice/pasta/breads/starches (no more than 45-50 grams per meal).  Maintain healthy weight with goal BMI <30.  Exercise 5 times per week for 30 minutes per day.  Stressed importance of daily foot exams.  Stressed importance of annual dilated eye exam.    Orders:  -     blood-glucose meter kit; To check BG daily, to use with insurance preferred meter  Dispense: 1 each; Refill: 0  -     lancets Misc; To check BG daily, to use with insurance preferred meter  Dispense: 100 each; Refill: 11  -     blood sugar diagnostic Strp; To check BG daily, to use with insurance preferred meter  Dispense: 100 strip; Refill: 11    3. Primary hypertension  Assessment & Plan:  Continue amlodipine-benazepril, HCTz as prescribed.  Low Sodium Diet (DASH Diet - Less than 2 grams of sodium per day).  Monitor blood pressure daily and log. Report consistent numbers greater than 138/89.  Smoking cessation encouraged to aid in BP reduction.  Goal BMI <30. Exercise 30 minutes per day, 5 days per week.   Go to the emergency department if symptoms of chest pain/tightness, shortness of breath, increased pain, fall, change in level of consciousness, fever/chills, temp >100.4 or any acute illness.          4. Hyperlipidemia associated with type 2 diabetes mellitus  Assessment & Plan:  HLD not well controlled. Continue repatha and vascepa as prescribed.  Notify the provider if you experience joint pain, headache, dizziness.  Follow low cholesterol, low fat diet. Avoid fried foods and high saturated fats.  Increase fiber intake. Foods high in soluble fiber, such as oats, beans, lentils, fruits, and vegetables, can help lower LDL cholesterol levels.   Exercise/walk 5 times per week for  30 minutes a day. Regular physical activity can help raise HDL (high-density lipoprotein) cholesterol and lower LDL cholesterol.             5. Poor appetite  Assessment & Plan:  Eat 3-4 small meals throughout the day.  Incorporate protein shakes such as glucerna in diet.  Start exercise program of walking 5 days a week, 15-30 minute intervals to stimulate metabolism.  Follow up in one month.           Follow up in 4 weeks for Decreased appetite and HLD.  In addition to their scheduled follow up, the patient has also been instructed to follow up on as needed basis.     Future Appointments   Date Time Provider Department Center   8/30/2024 10:00 AM [facility]    12/9/2024 10:30 AM Meri Pereyra MD [facility] [place] Ruiz NP

## 2024-08-05 PROBLEM — L29.9 PRURITUS: Status: ACTIVE | Noted: 2024-08-05

## 2024-08-05 PROBLEM — E11.42 CONTROLLED TYPE 2 DIABETES MELLITUS WITH DIABETIC POLYNEUROPATHY, WITHOUT LONG-TERM CURRENT USE OF INSULIN: Status: ACTIVE | Noted: 2024-08-05

## 2024-08-05 PROBLEM — R63.0 POOR APPETITE: Status: ACTIVE | Noted: 2024-08-05

## 2024-08-06 ENCOUNTER — TELEPHONE (OUTPATIENT)
Dept: FAMILY MEDICINE | Facility: CLINIC | Age: 69
End: 2024-08-06
Payer: MEDICARE

## 2024-08-06 ENCOUNTER — TELEPHONE (OUTPATIENT)
Dept: NUTRITION | Facility: HOSPITAL | Age: 69
End: 2024-08-06
Payer: MEDICARE

## 2024-08-06 ENCOUNTER — PATIENT OUTREACH (OUTPATIENT)
Facility: CLINIC | Age: 69
End: 2024-08-06
Payer: MEDICARE

## 2024-08-06 PROBLEM — E11.69 HYPERLIPIDEMIA ASSOCIATED WITH TYPE 2 DIABETES MELLITUS: Status: ACTIVE | Noted: 2024-08-06

## 2024-08-06 PROBLEM — E78.5 HYPERLIPIDEMIA ASSOCIATED WITH TYPE 2 DIABETES MELLITUS: Status: ACTIVE | Noted: 2024-08-06

## 2024-08-06 NOTE — ASSESSMENT & PLAN NOTE
Continue JANUMET  mg per tablet as prescribed.  On ACE and Statin according to guidelines.  Monitor FBG daily and log results for review at next visit.  Follow ADA Diet. Avoid soda, simple sweets, and limit rice/pasta/breads/starches (no more than 45-50 grams per meal).  Maintain healthy weight with goal BMI <30.  Exercise 5 times per week for 30 minutes per day.  Stressed importance of daily foot exams.  Stressed importance of annual dilated eye exam.

## 2024-08-06 NOTE — ASSESSMENT & PLAN NOTE
HLD not well controlled. Continue repatha and vascepa as prescribed.  Notify the provider if you experience joint pain, headache, dizziness.  Follow low cholesterol, low fat diet. Avoid fried foods and high saturated fats.  Increase fiber intake. Foods high in soluble fiber, such as oats, beans, lentils, fruits, and vegetables, can help lower LDL cholesterol levels.   Exercise/walk 5 times per week for 30 minutes a day. Regular physical activity can help raise HDL (high-density lipoprotein) cholesterol and lower LDL cholesterol.

## 2024-08-06 NOTE — ASSESSMENT & PLAN NOTE
Continue ointment as prescribed.  Avoid scratching or manipulating area as this may cause infection.

## 2024-08-06 NOTE — ASSESSMENT & PLAN NOTE
Continue amlodipine-benazepril, HCTz as prescribed.  Low Sodium Diet (DASH Diet - Less than 2 grams of sodium per day).  Monitor blood pressure daily and log. Report consistent numbers greater than 138/89.  Smoking cessation encouraged to aid in BP reduction.  Goal BMI <30. Exercise 30 minutes per day, 5 days per week.   Go to the emergency department if symptoms of chest pain/tightness, shortness of breath, increased pain, fall, change in level of consciousness, fever/chills, temp >100.4 or any acute illness.

## 2024-08-12 NOTE — ASSESSMENT & PLAN NOTE
Eat 3-4 small meals throughout the day.  Incorporate protein shakes such as glucerna in diet.  Start exercise program of walking 5 days a week, 15-30 minute intervals to stimulate metabolism.  Follow up in one month.

## 2024-08-26 DIAGNOSIS — J44.9 CHRONIC OBSTRUCTIVE PULMONARY DISEASE, UNSPECIFIED: ICD-10-CM

## 2024-08-26 DIAGNOSIS — J45.998 OTHER ASTHMA: ICD-10-CM

## 2024-08-26 RX ORDER — ALBUTEROL SULFATE 90 UG/1
INHALANT RESPIRATORY (INHALATION)
Qty: 18 G | Refills: 11 | Status: SHIPPED | OUTPATIENT
Start: 2024-08-26 | End: 2024-08-28 | Stop reason: SDUPTHER

## 2024-08-26 RX ORDER — HYDROCHLOROTHIAZIDE 25 MG/1
TABLET ORAL
Qty: 180 TABLET | Refills: 3 | Status: SHIPPED | OUTPATIENT
Start: 2024-08-26

## 2024-08-26 RX ORDER — AMLODIPINE AND BENAZEPRIL HYDROCHLORIDE 5; 10 MG/1; MG/1
1 CAPSULE ORAL
Qty: 90 CAPSULE | Refills: 3 | Status: SHIPPED | OUTPATIENT
Start: 2024-08-26

## 2024-08-26 RX ORDER — ALBUTEROL SULFATE 0.83 MG/ML
SOLUTION RESPIRATORY (INHALATION)
Qty: 75 ML | Refills: 11 | Status: SHIPPED | OUTPATIENT
Start: 2024-08-26

## 2024-08-26 RX ORDER — ICOSAPENT ETHYL 1 G/1
2 CAPSULE ORAL 2 TIMES DAILY
Qty: 120 CAPSULE | Refills: 11 | Status: SHIPPED | OUTPATIENT
Start: 2024-08-26

## 2024-08-28 ENCOUNTER — TELEPHONE (OUTPATIENT)
Dept: FAMILY MEDICINE | Facility: CLINIC | Age: 69
End: 2024-08-28
Payer: MEDICARE

## 2024-08-28 DIAGNOSIS — J44.9 CHRONIC OBSTRUCTIVE PULMONARY DISEASE, UNSPECIFIED: ICD-10-CM

## 2024-08-28 RX ORDER — ALBUTEROL SULFATE 90 UG/1
2 INHALANT RESPIRATORY (INHALATION) EVERY 6 HOURS PRN
Qty: 18 G | Refills: 11 | Status: SHIPPED | OUTPATIENT
Start: 2024-08-28

## 2024-08-28 NOTE — TELEPHONE ENCOUNTER
----- Message from Eder Finn sent at 8/28/2024  1:20 PM CDT -----  .Who Called: Pharm[facility]    Pharmacy is calling to request assistance with Rx    Pharmacy name and phone number: [facility] - [place] [address] [address] [facility]  Pharmacy contact: [phone]  Patient Name: Cheryl Solis  Prescription Name: albuterol (PROVENTIL/VENTOLIN HFA) 90 mcg/actuation inhaler   What do they need to clarify?:diagnostic code        Preferred Method of Contact: Phone Call  Patient's Preferred Phone Number on File: [phone]   Best Call Back Number, if different:  Additional Information:

## 2024-09-04 ENCOUNTER — OFFICE VISIT (OUTPATIENT)
Dept: FAMILY MEDICINE | Facility: CLINIC | Age: 69
End: 2024-09-04
Payer: MEDICARE

## 2024-09-04 VITALS
BODY MASS INDEX: 31.22 KG/M2 | DIASTOLIC BLOOD PRESSURE: 80 MMHG | WEIGHT: 159 LBS | RESPIRATION RATE: 16 BRPM | OXYGEN SATURATION: 99 % | HEART RATE: 66 BPM | SYSTOLIC BLOOD PRESSURE: 134 MMHG | HEIGHT: 60 IN

## 2024-09-04 DIAGNOSIS — E11.69 HYPERLIPIDEMIA ASSOCIATED WITH TYPE 2 DIABETES MELLITUS: ICD-10-CM

## 2024-09-04 DIAGNOSIS — I15.2 HYPERTENSION ASSOCIATED WITH DIABETES: ICD-10-CM

## 2024-09-04 DIAGNOSIS — M25.552 BILATERAL HIP PAIN: ICD-10-CM

## 2024-09-04 DIAGNOSIS — E11.59 HYPERTENSION ASSOCIATED WITH DIABETES: ICD-10-CM

## 2024-09-04 DIAGNOSIS — E11.42 CONTROLLED TYPE 2 DIABETES MELLITUS WITH DIABETIC POLYNEUROPATHY, WITHOUT LONG-TERM CURRENT USE OF INSULIN: Primary | ICD-10-CM

## 2024-09-04 DIAGNOSIS — M25.551 BILATERAL HIP PAIN: ICD-10-CM

## 2024-09-04 DIAGNOSIS — E78.5 HYPERLIPIDEMIA ASSOCIATED WITH TYPE 2 DIABETES MELLITUS: ICD-10-CM

## 2024-09-04 PROBLEM — J41.0 SIMPLE CHRONIC BRONCHITIS: Status: RESOLVED | Noted: 2024-08-02 | Resolved: 2024-09-04

## 2024-09-04 PROCEDURE — 99214 OFFICE O/P EST MOD 30 MIN: CPT | Mod: ,,,

## 2024-09-04 PROCEDURE — G2211 COMPLEX E/M VISIT ADD ON: HCPCS | Mod: ,,,

## 2024-09-04 NOTE — PROGRESS NOTES
Patient ID: [id number]     Chief Complaint: HTN, Diabetes, Cholesterol    HPI:     Cheryl Solis is a 69 y.o. female here today for a follow up.   DMII well controlled with current rx. She denies medication side effects. She is compliant with current rx. Reports FBG . DM eye exam is current ([facility]) and DM foot exam is UTD.   Initial BP reading elevated. Admits to not taking BP med daily. She denies medications side effects, denies dizziness, HA, chest pain or shortness of breath.   HLD is improving. Patient was approved for repatha. Reports receipt of medication and plans to start medication on Friday.   Patient c/o bilateral hip pain. Onset of pain is gradual. Pain occurs in the morning and improves throughout the day. She denies falls. Reports xray completed but she is not aware of the results.   Patient reports low appetite. She denies difficulty chewing, denies swallowing difficulty, denies heart burn, denies abdominal pain or n/v. Denies depression, anxiety, fatigue or weakness. Normal BM every other day. Denies blood in stool or urine. Denies alcohol or drug use. Denies insomnia, sleeps 6-8 hour nightly. Denies witnessed snoring or periods of apnea while asleep. Weight is stable. 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 States she is taste testing food throughout the day. By the end of the day she is not hungry.       Past Medical History:   Diagnosis Date    Anemia, unspecified     Fibromyalgia     HTN (hypertension)     Mixed hyperlipidemia     Obesity, unspecified     Sickle-cell disease without crisis     Sleep apnea, unspecified     Type 2 diabetes mellitus without complications     Vitamin D deficiency         Past Surgical History:   Procedure Laterality Date    BACK SURGERY      CARPAL TUNNEL RELEASE Bilateral      SECTION       SECTION       SECTION      COLONOSCOPY      COLONOSCOPY W/ POLYPECTOMY   09/17/2013    FOOT SURGERY Left     lasar eye       SYMPATHECTOMY          Social History     Tobacco Use    Smoking status: Never     Passive exposure: Never    Smokeless tobacco: Never   Substance and Sexual Activity    Alcohol use: Not Currently    Drug use: Never    Sexual activity: Yes        Current Outpatient Medications   Medication Instructions    albuterol (PROVENTIL) 2.5 mg /3 mL (0.083 %) nebulizer solution use one vial in nebulizer every 4 to 6 hours as needed for wheezing    albuterol (PROVENTIL/VENTOLIN HFA) 90 mcg/actuation inhaler 2 puffs, Inhalation, Every 6 hours PRN, Rescue    amlodipine-benazepril 5-10 mg (LOTREL) 5-10 mg per capsule 1 capsule, Oral    aspirin (ECOTRIN) 81 MG EC tablet TAKE ONE TABLET BY MOUTH DAILY    azelastine (OPTIVAR) 0.05 % ophthalmic solution 1 drop, Both Eyes, 2 times daily    [id number] (B COMPLEX-VITAMIN C) 20 mg-5 mg- 2 mg-75 mcg Chew 1 tab(s) chewed once a day    blood sugar diagnostic Strp To check BG daily, to use with insurance preferred meter    blood-glucose meter kit To check BG daily, to use with insurance preferred meter    EPINEPHrine (EPIPEN) 0.3 mg/0.3 mL AtIn 1 each, Intramuscular, Daily PRN    evolocumab (REPATHA SURECLICK) 140 mg, Subcutaneous, Every 14 days    ferrous gluconate (FERGON) 324 mg, Oral, 3 times daily with meals    fluticasone propionate (FLONASE) 50 mcg/actuation nasal spray 2 sprays, Each Nostril, Daily    gabapentin (NEURONTIN) 100 mg, Oral, 3 times daily    hydroCHLOROthiazide (HYDRODIURIL) 25 MG tablet TAKE ONE TABLET BY MOUTH TWICE DAILY    HYDROcodone-acetaminophen (NORCO)  mg per tablet 1 tablet, Oral, Every 4 hours    hydroxychloroquine (PLAQUENIL) 200 mg, Oral, 2 times daily    hydrOXYzine HCL (ATARAX) 25 mg, Oral    icosapent ethyL (VASCEPA) 2,000 mg, Oral, 2 times daily    JANUMET  mg per tablet 1 tablet, Oral, 2 times daily with meals    lancets Misc To check BG daily, to use with insurance  preferred meter    montelukast (SINGULAIR) 10 mg, Oral, Daily    nortriptyline (PAMELOR) 10 mg, Oral, Nightly    omeprazole (PRILOSEC) 40 mg, Oral, Daily, Before lunch    ondansetron (ZOFRAN) 4 mg, Oral, Every 6 hours    tiZANidine 4 mg Cap 1 tablet as needed Orally twice a day    traZODone (DESYREL) 50 mg, Oral, Nightly PRN       Review of patient's allergies indicates:   Allergen Reactions    Metformin Rash     Rash (skin)^  Rash (skin)^      Morphine Itching     Itchy (skin)^  Itchy (skin)^      Shellfish containing products     Vascepa [icosapent ethyl] Hives    Ibuprofen Rash     Hives (skin)^  Hives (skin)^          Patient Care Team:  Monica Max MD as PCP - General (Family Medicine)  Monica Max MD [place], Patrick A., MD as Consulting Physician (Gastroenterology)  Valerie Hein OD (Optometry)  Nilson Jerome MD as Consulting Physician (Ophthalmology)     Subjective:     Review of Systems    Constitutional: Denies Change in appetite. Denies Chills. Denies Fever. Denies Night sweats.  Eye: Denies Blurred vision. Denies Discharge. Denies Eye pain.  ENT: Denies Decreased hearing. Denies Sore throat. Denies Swollen glands.  Respiratory: Denies Cough. Denies Shortness of breath. Denies Shortness of breath with exertion. Denies Wheezing.  Cardiovascular: Denies Chest pain at rest. Denies Chest pain with exertion. Denies Irregular heartbeat. Denies Palpitations.  Gastrointestinal: Denies Abdominal pain. Denies Diarrhea. Denies Nausea. Denies Vomiting. Denies Hematemesis or Hematochezia.  Genitourinary: Denies Dysuria. Denies Urinary frequency. Denies Urinary urgency. Denies Blood in urine.  Endocrine: Denies Cold intolerance. Denies Excessive thirst. Denies Heat intolerance. Denies Weight loss. Denies Weight gain.  Musculoskeletal: Reports bilateral hip pain. Denies Weakness.  Integumentary: Denies Rash. Denies Itching. Denies Dry skin.  Neurologic: Denies Dizziness. Denies  Fainting. Denies Headache.  Psychiatric: As per HPI. Denies Depression. Denies Anxiety. Denies Suicidal/Homicidal ideations.    All Other ROS: Negative except as stated in HPI.    12 point review of systems conducted, negative except as stated in the history of present illness. See HPI for details.    Objective:     Visit Vitals  /80 (BP Location: Left arm)   Pulse 66   Resp 16   Ht 5' (1.524 m)   Wt 72.1 kg (159 lb)   SpO2 99%   BMI 31.05 kg/m²     Physical Exam    General: Alert and oriented, No acute distress. Obese.   Head: Normocephalic, Atraumatic.  Eye: Pupils are equal, round and reactive to light, Extraocular movements are intact, Sclera non-icteric.  Ears/Nose/Throat: Normal, Mucosa moist,Clear.  Neck/Thyroid: Supple, Non-tender, No carotid bruit, No palpable thyromegaly or thyroid nodule, No lymphadenopathy, No JVD, Full range of motion.  Respiratory: Clear to auscultation bilaterally; No wheezes, rales or rhonchi,Non-labored respirations, Symmetrical chest wall expansion.  Cardiovascular: Regular rate and rhythm, S1/S2 normal, No murmurs, rubs or gallops.  Gastrointestinal: Soft, Non-tender, Non-distended, Normal bowel sounds, No palpable organomegaly.  Musculoskeletal: All extremities normal range of motion.  Integumentary: Warm, Dry, Intact, No suspicious lesions or rashes.  Extremities: No clubbing, cyanosis or edema  Neurologic: No focal deficits, Cranial Nerves II-XII are grossly intact, Motor strength normal upper and lower extremities, Sensory exam intact.  Psychiatric: Normal interaction, Coherent speech, Euthymic mood, Appropriate affect     Labs Reviewed:     Chemistry:  Lab Results   Component Value Date     05/30/2024    K 4.0 05/30/2024    BUN 12.1 05/30/2024    CREATININE 1.00 05/30/2024    EGFRNORACEVR >60 05/30/2024    GLUCOSE 115 05/30/2024    CALCIUM 9.9 05/30/2024    ALKPHOS 56 05/30/2024    LABPROT 7.6 05/30/2024    ALBUMIN 4.1 05/30/2024    BILIDIR 0.2 02/04/2022    IBILI  0.40 02/04/2022    AST 14 05/30/2024    ALT 12 05/30/2024    JDWWYALP03RJ 46.0 04/17/2024    TSH 0.612 04/17/2024    XUVTMX1CRSD 0.79 02/04/2022        Lab Results   Component Value Date    HGBA1C 5.5 04/17/2024        Hematology:  Lab Results   Component Value Date    WBC 6.69 05/30/2024    HGB 11.0 (L) 05/30/2024    HCT 31.6 (L) 05/30/2024     05/30/2024       Lipid Panel:  Lab Results   Component Value Date    CHOL 228 (H) 04/17/2024    HDL 58 04/17/2024    .00 (H) 04/17/2024    TRIG 141 (H) 04/17/2024    TOTALCHOLEST 4 04/17/2024        Urine:  Lab Results   Component Value Date    APPEARANCEUA Clear 05/30/2024    SGUA 1.025 05/30/2024    PROTEINUA Negative 05/30/2024    KETONESUA Negative 05/30/2024    LEUKOCYTESUR Negative 05/30/2024    RBCUA None Seen 05/30/2024    WBCUA None Seen 05/30/2024    BACTERIA None Seen 05/30/2024    SQEPUA Trace 04/17/2024    HYALINECASTS 6-10 (A) 04/17/2024    CREATRANDUR 178.8 (H) 05/30/2024    PROTEINURINE 9.1 05/30/2024    UPROTCREA 0.1 05/30/2024        Assessment:       ICD-10-CM ICD-9-CM   1. Controlled type 2 diabetes mellitus with diabetic polyneuropathy, without long-term current use of insulin  E11.42 250.60     357.2   2. Hypertension associated with diabetes  E11.59 250.80    I15.2 401.9   3. Hyperlipidemia associated with type 2 diabetes mellitus  E11.69 250.80    E78.5 272.4   4. Bilateral hip pain  M25.551 719.45    M25.552         Plan:     1. Controlled type 2 diabetes mellitus with diabetic polyneuropathy, without long-term current use of insulin  Assessment & Plan:  Continue JANUMET  mg per tablet as prescribed.  Monitor FBG daily and log results. Bring readings to next visit.  Follow ADA Diet. Avoid soda, simple sweets, and limit rice/pasta/breads/starches (no more than 45-50 grams per meal).  Maintain healthy weight with goal BMI <30. Exercise 5 times per week for 30 minutes per day.  Stressed importance of daily foot exams.  Stressed  importance of annual dilated eye exam.    Orders:  -     Comprehensive Metabolic Panel; Future; Expected date: 10/05/2024  -     Hemoglobin A1C; Future; Expected date: 10/05/2024  -     Lipid Panel; Future; Expected date: 10/05/2024    2. Hypertension associated with diabetes  Assessment & Plan:  BP recheck 134/80.Continue amlodipine-benazepril, HCTz as prescribed. Stressed importance of taking BP medication daily.  Continue to follow a Sodium Diet (DASH Diet - Less than 2 grams of sodium per day).  Monitor blood pressure daily and log. Bring BP log to next visit. Report consistent numbers greater than 138/89.  Goal BMI <30. Exercise 30 minutes per day, 5 days per week.   Go to the emergency department if symptoms of chest pain/tightness, shortness of breath, increased pain, fall, change in level of consciousness, fever/chills, temp >100.4 or any acute illnes    Orders:  -     Comprehensive Metabolic Panel; Future; Expected date: 10/05/2024  -     Hemoglobin A1C; Future; Expected date: 10/05/2024  -     Lipid Panel; Future; Expected date: 10/05/2024    3. Hyperlipidemia associated with type 2 diabetes mellitus  Assessment & Plan:  HLD not well controlled. Start repatha as prescribed. Vascepa discontinued. Notify the provider if you experience joint pain, headache, dizziness.  Follow low cholesterol, low fat diet. Avoid fried foods and high saturated fats.  Increase fiber intake. Foods high in soluble fiber, such as oats, beans, lentils, fruits, and vegetables, can help lower LDL cholesterol levels.   Exercise/walk 5 times per week for 30 minutes a day. Regular physical activity can help raise HDL (high-density lipoprotein) cholesterol and lower LDL cholesterol    Orders:  -     Comprehensive Metabolic Panel; Future; Expected date: 10/05/2024  -     Hemoglobin A1C; Future; Expected date: 10/05/2024  -     Lipid Panel; Future; Expected date: 10/05/2024    4. Bilateral hip pain  Assessment & Plan:  Reviewed xray result  with patient.  Discussed physical therapy, patient declined.   Provided patient with at home exercises.  Walking/exercise 5 days a week for 30 minutes.                 Schedule annual wellness for 2025.  In addition to their scheduled follow up, the patient has also been instructed to follow up on as needed basis.       Future Appointments   Date Time Provider Department Center   10/15/2024  8:00 AM [facility]    12/9/2024 10:30 AM Meri Pereyra MD [facility]         SAMAN Saeed

## 2024-09-05 PROBLEM — M25.552 BILATERAL HIP PAIN: Status: ACTIVE | Noted: 2024-09-05

## 2024-09-05 PROBLEM — M25.551 BILATERAL HIP PAIN: Status: ACTIVE | Noted: 2024-09-05

## 2024-09-05 NOTE — ASSESSMENT & PLAN NOTE
Continue JANUMET  mg per tablet as prescribed.  Monitor FBG daily and log results. Bring readings to next visit.  Follow ADA Diet. Avoid soda, simple sweets, and limit rice/pasta/breads/starches (no more than 45-50 grams per meal).  Maintain healthy weight with goal BMI <30. Exercise 5 times per week for 30 minutes per day.  Stressed importance of daily foot exams.  Stressed importance of annual dilated eye exam.

## 2024-09-05 NOTE — ASSESSMENT & PLAN NOTE
Reviewed xray result with patient.  Discussed physical therapy, patient declined.   Provided patient with at home exercises.  Walking/exercise 5 days a week for 30 minutes.

## 2024-09-05 NOTE — ASSESSMENT & PLAN NOTE
HLD not well controlled. Start repatha as prescribed. Vascepa discontinued. Notify the provider if you experience joint pain, headache, dizziness.  Follow low cholesterol, low fat diet. Avoid fried foods and high saturated fats.  Increase fiber intake. Foods high in soluble fiber, such as oats, beans, lentils, fruits, and vegetables, can help lower LDL cholesterol levels.   Exercise/walk 5 times per week for 30 minutes a day. Regular physical activity can help raise HDL (high-density lipoprotein) cholesterol and lower LDL cholesterol

## 2024-09-30 ENCOUNTER — TELEPHONE (OUTPATIENT)
Dept: FAMILY MEDICINE | Facility: CLINIC | Age: 69
End: 2024-09-30
Payer: MEDICARE

## 2024-09-30 NOTE — TELEPHONE ENCOUNTER
----- Message from Brandon sent at 9/30/2024 11:01 AM CDT -----  .Type:  Needs Medical Advice    Who Called: [facility]  Symptoms (please be specific):    How long has patient had these symptoms:    Pharmacy name and phone #:    Would the patient rather a call back or a response via [facility]?   Best Call Back Number: [phone]  Additional Information:  Wanted to let the office know that they were denying the referral due to the patient's insurance.

## 2024-12-09 ENCOUNTER — LAB VISIT (OUTPATIENT)
Dept: LAB | Facility: HOSPITAL | Age: 69
End: 2024-12-09
Payer: MEDICARE

## 2024-12-09 DIAGNOSIS — Z79.899 DRUG-INDUCED IMMUNODEFICIENCY: ICD-10-CM

## 2024-12-09 DIAGNOSIS — I10 PRIMARY HYPERTENSION: ICD-10-CM

## 2024-12-09 DIAGNOSIS — N18.2 CKD (CHRONIC KIDNEY DISEASE) STAGE 2, GFR 60-89 ML/MIN: ICD-10-CM

## 2024-12-09 DIAGNOSIS — D84.821 DRUG-INDUCED IMMUNODEFICIENCY: ICD-10-CM

## 2024-12-09 DIAGNOSIS — M35.1 MCTD (MIXED CONNECTIVE TISSUE DISEASE): ICD-10-CM

## 2024-12-09 LAB
ALBUMIN SERPL-MCNC: 3.9 G/DL (ref 3.4–4.8)
ALBUMIN/GLOB SERPL: 1.1 RATIO (ref 1.1–2)
ALP SERPL-CCNC: 52 UNIT/L (ref 40–150)
ALT SERPL-CCNC: 9 UNIT/L (ref 0–55)
ANION GAP SERPL CALC-SCNC: 6 MEQ/L
AST SERPL-CCNC: 13 UNIT/L (ref 5–34)
BASOPHILS # BLD AUTO: 0.03 X10(3)/MCL
BASOPHILS NFR BLD AUTO: 0.5 %
BILIRUB SERPL-MCNC: 0.5 MG/DL
BUN SERPL-MCNC: 10 MG/DL (ref 9.8–20.1)
CALCIUM SERPL-MCNC: 9.3 MG/DL (ref 8.4–10.2)
CHLORIDE SERPL-SCNC: 107 MMOL/L (ref 98–107)
CO2 SERPL-SCNC: 28 MMOL/L (ref 23–31)
CREAT SERPL-MCNC: 0.89 MG/DL (ref 0.55–1.02)
CREAT UR-MCNC: 105.2 MG/DL (ref 45–106)
CREAT/UREA NIT SERPL: 11
EOSINOPHIL # BLD AUTO: 0.27 X10(3)/MCL (ref 0–0.9)
EOSINOPHIL NFR BLD AUTO: 4.8 %
ERYTHROCYTE [DISTWIDTH] IN BLOOD BY AUTOMATED COUNT: 13.1 % (ref 11.5–17)
GFR SERPLBLD CREATININE-BSD FMLA CKD-EPI: >60 ML/MIN/1.73/M2
GLOBULIN SER-MCNC: 3.4 GM/DL (ref 2.4–3.5)
GLUCOSE SERPL-MCNC: 98 MG/DL (ref 82–115)
HCT VFR BLD AUTO: 28.9 % (ref 37–47)
HGB BLD-MCNC: 10.4 G/DL (ref 12–16)
IMM GRANULOCYTES # BLD AUTO: 0 X10(3)/MCL (ref 0–0.04)
IMM GRANULOCYTES NFR BLD AUTO: 0 %
LYMPHOCYTES # BLD AUTO: 1.75 X10(3)/MCL (ref 0.6–4.6)
LYMPHOCYTES NFR BLD AUTO: 31.1 %
MCH RBC QN AUTO: 29.5 PG (ref 27–31)
MCHC RBC AUTO-ENTMCNC: 36 G/DL (ref 33–36)
MCV RBC AUTO: 81.9 FL (ref 80–94)
MONOCYTES # BLD AUTO: 0.46 X10(3)/MCL (ref 0.1–1.3)
MONOCYTES NFR BLD AUTO: 8.2 %
NEUTROPHILS # BLD AUTO: 3.12 X10(3)/MCL (ref 2.1–9.2)
NEUTROPHILS NFR BLD AUTO: 55.4 %
PLATELET # BLD AUTO: 289 X10(3)/MCL (ref 130–400)
PMV BLD AUTO: 9.8 FL (ref 7.4–10.4)
POTASSIUM SERPL-SCNC: 3.8 MMOL/L (ref 3.5–5.1)
PROT SERPL-MCNC: 7.3 GM/DL (ref 5.8–7.6)
PROT UR STRIP-MCNC: 7.7 MG/DL
RBC # BLD AUTO: 3.53 X10(6)/MCL (ref 4.2–5.4)
SODIUM SERPL-SCNC: 141 MMOL/L (ref 136–145)
URINE PROTEIN/CREATININE RATIO (OLG): 0.1
WBC # BLD AUTO: 5.63 X10(3)/MCL (ref 4.5–11.5)

## 2024-12-09 PROCEDURE — 80053 COMPREHEN METABOLIC PANEL: CPT

## 2024-12-09 PROCEDURE — 36415 COLL VENOUS BLD VENIPUNCTURE: CPT

## 2024-12-09 PROCEDURE — 85025 COMPLETE CBC W/AUTO DIFF WBC: CPT

## 2024-12-09 PROCEDURE — 84156 ASSAY OF PROTEIN URINE: CPT

## 2024-12-12 ENCOUNTER — OFFICE VISIT (OUTPATIENT)
Dept: NEPHROLOGY | Facility: CLINIC | Age: 69
End: 2024-12-12
Payer: MEDICARE

## 2024-12-12 VITALS
OXYGEN SATURATION: 99 % | SYSTOLIC BLOOD PRESSURE: 161 MMHG | HEIGHT: 60 IN | BODY MASS INDEX: 30.11 KG/M2 | RESPIRATION RATE: 20 BRPM | TEMPERATURE: 98 F | DIASTOLIC BLOOD PRESSURE: 75 MMHG | WEIGHT: 153.38 LBS | HEART RATE: 59 BPM

## 2024-12-12 DIAGNOSIS — M35.1 MCTD (MIXED CONNECTIVE TISSUE DISEASE): ICD-10-CM

## 2024-12-12 DIAGNOSIS — Z79.899 DRUG-INDUCED IMMUNODEFICIENCY: ICD-10-CM

## 2024-12-12 DIAGNOSIS — D84.821 DRUG-INDUCED IMMUNODEFICIENCY: ICD-10-CM

## 2024-12-12 DIAGNOSIS — N18.2 CKD (CHRONIC KIDNEY DISEASE) STAGE 2, GFR 60-89 ML/MIN: Primary | ICD-10-CM

## 2024-12-12 DIAGNOSIS — I10 PRIMARY HYPERTENSION: ICD-10-CM

## 2024-12-12 PROCEDURE — 99999 PR PBB SHADOW E&M-EST. PATIENT-LVL V: CPT | Mod: PBBFAC,,,

## 2024-12-12 PROCEDURE — 99215 OFFICE O/P EST HI 40 MIN: CPT | Mod: PBBFAC

## 2024-12-12 RX ORDER — PREGABALIN 100 MG/1
100 CAPSULE ORAL 2 TIMES DAILY
COMMUNITY

## 2024-12-12 RX ORDER — UBROGEPANT 100 MG/1
100 TABLET ORAL
COMMUNITY
Start: 2024-11-29

## 2024-12-12 RX ORDER — TOPIRAMATE 100 MG/1
100 TABLET, FILM COATED ORAL
COMMUNITY

## 2024-12-12 RX ORDER — CELECOXIB 200 MG/1
200 CAPSULE ORAL DAILY
COMMUNITY

## 2024-12-12 RX ORDER — LEVOCETIRIZINE DIHYDROCHLORIDE 5 MG/1
5 TABLET, FILM COATED ORAL NIGHTLY
COMMUNITY

## 2024-12-12 NOTE — PATIENT INSTRUCTIONS
Blood pressure goal: consistently less than 130/85    AC1 goal for diabetics: less than 7%    Avoid NSAIDs and COX2 inhibitors: Advil (ibuprofen), Aleve (naproxen), Mobic (meloxicam), Celebrex (celecoxib), Toradol (ketorolac) and Diclofenac (voltaren), Indomethacin (indocin).    Only take tylenol (acetaminophen) occasionally if needed for aches/pains.    Recommend low sodium diet:  Less than 2,000 mg per day  Avoid high salt foods (olives, pickles, smoked meats, deli meats, salted potato chips, fast food, etc.).   Do not add salt to your food at the table.   Use only small amounts of salt when cooking.      Avoid alcohol and soda. Limit tea and coffee.     Stay well hydrated with water      CHRONIC KIDNEY DISEASE BASIC INFORMATION:    Your kidneys do many important jobs. Some of the ways they keep your whole body in balance include:  Removing natural waste products and extra water from your body  Helping make red blood cells  Balancing important minerals in your body  Helping maintain your blood pressure  Keeping your bones healthy    Chronic kidney disease (CKD) is when the kidneys have become damaged over time (for at least 3 months) and have a hard time doing all their important jobs. CKD also increases the risk of other health problems like heart disease and stroke. Developing CKD is usually a very slow process with very few symptoms at first.    Risk Factors  Anyone can develop CKD - at any age. However, some people are at a higher risk than others. The most common CKD risk factors are:  Diabetes  High blood pressure (hypertension)  Heart disease and/or heart failure  Obesity  Over the age of 60  Family history of CKD or kidney failure  Personal history of acute kidney injury (FAYE)  Smoking and/or use of tobacco products    For many people, CKD is not caused by just one reason. Instead, it is a result of many physical, environmental, and social factors.      For more education on kidney disease you can  visit:  [email or web address]

## 2024-12-12 NOTE — PROGRESS NOTES
[facility] [place]    HPI  Cheryl Solis, 69 y.o. female, presents to for follow up of FAYE on CKD.  FAYE resolved with discontinuation of NSAIDs.  History of HTN, mixed connective tissue disorder followed by , sickle cell trait, DM2, fibromyalgia.   Mixed connective tissue disorder maintained on Plaquenil.  Renal ultrasound negative for any significant abnormality.  SPEP negative for M spike.  She has no complaints today except for decreased appetite.       Patient denies  denies recent episode of dehydration, diarrhea, vomiting, acute illness, hospitalization, recent angiograms or exposure to IV radiocontrast.        Medical Diagnoses:   Past Medical History:   Diagnosis Date    Anemia, unspecified     Fibromyalgia     HTN (hypertension)     Mixed hyperlipidemia     Obesity, unspecified     Sickle-cell disease without crisis     Sleep apnea, unspecified     Type 2 diabetes mellitus without complications     Vitamin D deficiency      Patient Active Problem List   Diagnosis    Sickle-cell trait    Peripheral vascular disease, unspecified    MCTD (mixed connective tissue disease)    Degenerative lumbar spinal stenosis    Migraine headache    Iron deficiency anemia    Renal insufficiency    Vitamin D deficiency    Primary hypertension    Sickle cell trait    Fibromyalgia syndrome    Insomnia    Neuropathy    Drug-induced immunodeficiency    Asthma, persistent controlled    Stage 3b chronic kidney disease    Osteoporosis without current pathological fracture    CKD (chronic kidney disease) stage 2, GFR 60-89 ml/min    Poor appetite    Controlled type 2 diabetes mellitus with diabetic polyneuropathy, without long-term current use of insulin    Pruritus    Hyperlipidemia associated with type 2 diabetes mellitus    Bilateral hip pain       Surgical History:   Past Surgical History:   Procedure Laterality Date    BACK SURGERY      CARPAL TUNNEL RELEASE Bilateral      SECTION        SECTION       SECTION      COLONOSCOPY      COLONOSCOPY W/ POLYPECTOMY  2013    FOOT SURGERY Left     lasar eye       SYMPATHECTOMY         Family History:   Family History   Problem Relation Name Age of Onset    Cancer Mother      Stroke Sister      Hypertension Sister      Drug abuse Brother      Alcohol abuse Brother         Social History:   Social History     Tobacco Use    Smoking status: Never     Passive exposure: Never    Smokeless tobacco: Never   Substance Use Topics    Alcohol use: Not Currently       Allergies:  Review of patient's allergies indicates:   Allergen Reactions    Metformin Rash     Rash (skin)^  Rash (skin)^      Morphine Itching     Itchy (skin)^  Itchy (skin)^      Shellfish containing products     Vascepa [icosapent ethyl] Hives    Ibuprofen Rash     Hives (skin)^  Hives (skin)^         Medications:  Outpatient Medications Marked as Taking for the 24 encounter (Office Visit) with Eder Rivera FNP   Medication Sig Dispense Refill    albuterol (PROVENTIL) 2.5 mg /3 mL (0.083 %) nebulizer solution use one vial in nebulizer every 4 to 6 hours as needed for wheezing 75 mL 11    albuterol (PROVENTIL/VENTOLIN HFA) 90 mcg/actuation inhaler Inhale 2 puffs into the lungs every 6 (six) hours as needed for Wheezing. Rescue 18 g 11    amlodipine-benazepril 5-10 mg (LOTREL) 5-10 mg per capsule TAKE ONE CAPSULE BY MOUTH DAILY 90 capsule 3    aspirin (ECOTRIN) 81 MG EC tablet TAKE ONE TABLET BY MOUTH DAILY 30 tablet 5    [id number] (B COMPLEX-VITAMIN C) 20 mg-5 mg- 2 mg-75 mcg Chew 1 tab(s) chewed once a day      blood sugar diagnostic Strp To check BG daily, to use with insurance preferred meter 100 strip 11    blood-glucose meter kit To check BG daily, to use with insurance preferred meter 1 each 0    celecoxib (CELEBREX) 200 MG capsule Take 200 mg by mouth once daily.      EPINEPHrine (EPIPEN) 0.3 mg/0.3 mL AtIn Inject 1 each into the muscle  daily as needed.      evolocumab (REPATHA SURECLICK) 140 mg/mL PnIj Inject 1 mL (140 mg total) into the skin every 14 (fourteen) days. 2 mL 11    ferrous gluconate (FERGON) 324 MG tablet Take 1 tablet (324 mg total) by mouth 3 (three) times daily with meals. 90 tablet 11    fluticasone propionate (FLONASE) 50 mcg/actuation nasal spray 2 sprays by Each Nostril route once daily.      gabapentin (NEURONTIN) 100 MG capsule Take 1 capsule (100 mg total) by mouth 3 (three) times daily. 270 capsule 3    hydroCHLOROthiazide (HYDRODIURIL) 25 MG tablet TAKE ONE TABLET BY MOUTH TWICE DAILY 180 tablet 3    HYDROcodone-acetaminophen (NORCO)  mg per tablet Take 1 tablet by mouth every 4 (four) hours.      hydrOXYchloroQUINE (PLAQUENIL) 200 mg tablet Take 1 tablet (200 mg total) by mouth 2 (two) times daily. 180 tablet 3    hydrOXYzine HCL (ATARAX) 25 MG tablet Take 25 mg by mouth.      icosapent ethyL (VASCEPA) 1 gram Cap TAKE TWO CAPSULES BY MOUTH TWICE DAILY 120 capsule 11    JANUMET  mg per tablet Take 1 tablet by mouth 2 (two) times daily with meals. 180 tablet 3    lancets Misc To check BG daily, to use with insurance preferred meter 100 each 11    levocetirizine (XYZAL) 5 MG tablet Take 5 mg by mouth every evening.      montelukast (SINGULAIR) 10 mg tablet Take 10 mg by mouth once daily.      nortriptyline (PAMELOR) 10 MG capsule Take 10 mg by mouth every evening.      ondansetron (ZOFRAN) 4 MG tablet Take 1 tablet (4 mg total) by mouth every 6 (six) hours. 12 tablet 0    pregabalin (LYRICA) 100 MG capsule Take 100 mg by mouth 2 (two) times daily.      tiZANidine 4 mg Cap 1 tablet as needed Orally twice a day      topiramate (TOPAMAX) 100 MG tablet Take 100 mg by mouth as needed.      traZODone (DESYREL) 50 MG tablet Take 50 mg by mouth nightly as needed.      UBRELVY 100 mg tablet Take 100 mg by mouth as needed.           Review of Systems:    Constitutional: Denies fever, fatigue, generalized weakness  Skin:  Denies wounds, no rashes, no itching, no new skin lesions  Respiratory:  Denies cough, shortness of breath, or wheezing  Cardiovascular: Denies chest pain, palpitations, or swelling  Gastrointestional: Denies abdominal pain, nausea, vomiting, diarrhea, or constipation  Genitourinary: Denies dysuria, hematuria, foamy urine, or incontinence; reports able to empty bladder  Musculoskeletal: Denies back or flank pain  Neurological: Denies headaches, dizziness, paresthesias, tremors or focal weakness      Vital Signs:  BP (!) 161/75 (BP Location: Right arm, Patient Position: Sitting)   Pulse (!) 59   Temp 98.3 °F (36.8 °C) (Oral)   Resp 20   Ht 5' (1.524 m)   Wt 69.6 kg (153 lb 6.4 oz)   SpO2 99%   BMI 29.96 kg/m²   Body mass index is 29.96 kg/m².      Physical Exam:    General: no acute distress, awake, alert  Eyes: PERRLA, conjunctiva clear, eyelids without swelling  HENT: atraumatic, oropharynx and nasal mucosa patent  Neck: supple, trache midline, full ROM, no JVD  Respiratory: equal, unlabored, clear to auscultation A/P  Cardiovascular: RRR without murmur or rub;  Edema: none  Gastrointestinal: soft, non-tender, non-distended  Musculoskeletal: ROM without new limitation or discomfort  Integumentary: warm, dry; no rashes, wounds, or skin lesions  Neurological: oriented x4, appropriate, no acute deficits; no asterixis      Labs:        Component Value Date/Time     12/09/2024 0821     05/30/2024 0751    K 3.8 12/09/2024 0821    K 4.0 05/30/2024 0751     12/09/2024 0821     05/30/2024 0751    CO2 28 12/09/2024 0821    CO2 28 05/30/2024 0751    BUN 10.0 12/09/2024 0821    BUN 12.1 05/30/2024 0751    CREATININE 0.89 12/09/2024 0821    CREATININE 1.00 05/30/2024 0751    CREATININE 1.46 (H) 04/17/2024 0914    CREATININE 0.88 11/12/2023 2122    CALCIUM 9.3 12/09/2024 0821    CALCIUM 9.9 05/30/2024 0751    PTH 59.1 05/30/2024 0751           Component Value Date/Time    WBC 5.63 12/09/2024 0821     WBC 6.69 05/30/2024 0751    HGB 10.4 (L) 12/09/2024 0821    HGB 11.0 (L) 05/30/2024 0751    HCT 28.9 (L) 12/09/2024 0821    HCT 31.6 (L) 05/30/2024 0751     12/09/2024 0821     05/30/2024 0751         Imaging:  Retroperitoneal US:    Impression:     No significant abnormalities identified     Date:                                            04/29/2024  Impression:    1. CKD (chronic kidney disease) stage 2, GFR 60-89 ml/min    2. Primary hypertension    3. MCTD (mixed connective tissue disease)    4. Drug-induced immunodeficiency      Baseline CKD Likely secondary to nephrosclerosis ---> very stable  No significant proteinuria  Did not take blood pressure medication this morning  Mixed connective tissue disorder and fibromyalgia previously managed by .  Maintained on Plaquenil;Positive JOCELYN; urine sediment not suggestive of any lupus involvement  DM2 managed per PCP.    Complains of decreased appetite.  Advised to speak with PCP    Plan:  Adequate fluid intake  Refrain from NSAIDs  Blood pressure control    Labs in 6 months  Follow up in 1 year with labs within the week before.    Patient to call our office with any concerns prior to next appointment.    Avoid NSAIDs (Aleve, Mobic, Celebrex, Ibuprofen, Advil, Toradol and Diclofenac).  Only take tylenol occasionally if needed for aches/pains.    Educated on low sodium diet:  Avoid high salt foods (olives, pickles, smoked meats, deli meats, salted potato chips, etc.).   Do not add salt to your food at the table.   Use only small amounts of salt when cooking.        Eder DAVISON        This note was created with the assistance of EQAL voice recognition software or phone dictation. There may be transcription errors as a result of using this technology however minimal. Effort has been made to assure accuracy of transcription but any obvious errors or omissions should be clarified with the author of the document.

## 2025-01-06 ENCOUNTER — PATIENT OUTREACH (OUTPATIENT)
Facility: CLINIC | Age: 70
End: 2025-01-06
Payer: MEDICARE

## 2025-01-06 DIAGNOSIS — I10 PRIMARY HYPERTENSION: Primary | ICD-10-CM

## 2025-01-07 ENCOUNTER — OUTPATIENT CASE MANAGEMENT (OUTPATIENT)
Dept: ADMINISTRATIVE | Facility: OTHER | Age: 70
End: 2025-01-07
Payer: MEDICARE

## 2025-01-10 ENCOUNTER — OUTPATIENT CASE MANAGEMENT (OUTPATIENT)
Dept: ADMINISTRATIVE | Facility: OTHER | Age: 70
End: 2025-01-10
Payer: MEDICARE

## 2025-01-23 ENCOUNTER — OUTPATIENT CASE MANAGEMENT (OUTPATIENT)
Dept: ADMINISTRATIVE | Facility: OTHER | Age: 70
End: 2025-01-23
Payer: MEDICARE

## 2025-01-23 NOTE — PROGRESS NOTES
Outpatient Care Management  Plan of Care Follow Up Visit    Patient: Cheryl Solis  MRN: [id number]  Date of Service: 01/23/2025  Completed by: Nilsa Bain RN  Referral Date: 01/07/2025    No chief complaint on file.      Brief Summary: OPCM follow up call completed with Patient. Continued Educationon Importance of Monitoring BP daily and Consumption of Water.  Next Steps: Patient agrees to follow up in approximately 1 week.

## 2025-01-31 ENCOUNTER — OUTPATIENT CASE MANAGEMENT (OUTPATIENT)
Dept: ADMINISTRATIVE | Facility: OTHER | Age: 70
End: 2025-01-31
Payer: MEDICARE

## 2025-01-31 DIAGNOSIS — E11.59 HYPERTENSION ASSOCIATED WITH DIABETES: Primary | ICD-10-CM

## 2025-01-31 DIAGNOSIS — I15.2 HYPERTENSION ASSOCIATED WITH DIABETES: Primary | ICD-10-CM

## 2025-01-31 RX ORDER — ACETAMINOPHEN 500 MG
1 TABLET ORAL DAILY
Qty: 1 EACH | Refills: 0 | Status: SHIPPED | OUTPATIENT
Start: 2025-01-31

## 2025-01-31 NOTE — PROGRESS NOTES
Outpatient Care Management  Plan of Care Follow Up Visit    Patient: Cheryl Solis  MRN: [id number]  Date of Service: 01/31/2025  Completed by: Nilsa Bain RN  Referral Date: 01/07/2025    Reason for Visit   Patient presents with    OPCM RN Follow Up Call    OPCM Chart Review       Brief Summary: OPCM follow up call completed with Patient. Continued Education On Importance of Monitoring Patient's BP.   Next Steps: Patient agrees to follow up in approximately 2 weeks.

## 2025-01-31 NOTE — PROGRESS NOTES
"1-31-25  Patient mentioned she switched pharmacies as her old pharmacy would cost her "$300 to $400 for my Janumet and with [facility] it's going to cost me $12.00"    "

## 2025-02-10 ENCOUNTER — OUTPATIENT CASE MANAGEMENT (OUTPATIENT)
Dept: ADMINISTRATIVE | Facility: OTHER | Age: 70
End: 2025-02-10
Payer: MEDICARE

## 2025-02-10 NOTE — PROGRESS NOTES
Outpatient Care Management  Plan of Care Follow Up Visit    Patient: Cheryl Solis  MRN: [id number]  Date of Service: 02/10/2025  Completed by: Nilsa Bain RN  Referral Date: 01/07/2025    No chief complaint on file.      Brief Summary: OPCM follow up call completed with Patient. Continued Education on What High Blood Pressure can do to her Health.   Next Steps: Patient agrees to follow up in 1 week.

## 2025-02-11 ENCOUNTER — OUTPATIENT CASE MANAGEMENT (OUTPATIENT)
Dept: ADMINISTRATIVE | Facility: OTHER | Age: 70
End: 2025-02-11
Payer: MEDICARE

## 2025-02-11 NOTE — PROGRESS NOTES
2-11-25 CM phoned Patient to inform her Dr. Max's office called CM and stated the script for BP monitor was sent to [facility].

## 2025-02-18 ENCOUNTER — OUTPATIENT CASE MANAGEMENT (OUTPATIENT)
Dept: ADMINISTRATIVE | Facility: OTHER | Age: 70
End: 2025-02-18
Payer: MEDICARE

## 2025-02-18 NOTE — PROGRESS NOTES
Outpatient Care Management  Plan of Care Follow Up Visit    Patient: Cheryl Solis  MRN: [id number]  Date of Service: 02/18/2025  Completed by: Nilsa Bain RN  Referral Date: 01/07/2025    Reason for Visit   Patient presents with    OPCM RN Follow Up Call    OPCM Chart Review       Brief Summary: OPCM follow up call completed with Patient. Continued education on Importance of Monitoring BP daily. Reviewed and discussed how ss of HTN may not present.  Next Steps: Patient agrees to follow up in approximately 2 weeks.

## 2025-02-28 ENCOUNTER — OUTPATIENT CASE MANAGEMENT (OUTPATIENT)
Dept: ADMINISTRATIVE | Facility: OTHER | Age: 70
End: 2025-02-28
Payer: MEDICARE

## 2025-02-28 NOTE — PROGRESS NOTES
Outpatient Care Management  Plan of Care Follow Up Visit    Patient: Cheryl Solis  MRN: [id number]  Date of Service: 02/28/2025  Completed by: Nilsa Bain RN  Referral Date: 01/07/2025    No chief complaint on file.      Brief Summary: OPCM follow up call completed with Patient. Patient still hasn't obtained BP monitor and CM Continued Education on Importance of doing so and Monitoring BP.  Next Steps: Patient agrees to follow up in approximately 2 weeks.

## 2025-03-18 ENCOUNTER — OUTPATIENT CASE MANAGEMENT (OUTPATIENT)
Dept: ADMINISTRATIVE | Facility: OTHER | Age: 70
End: 2025-03-18
Payer: MEDICARE

## 2025-03-18 NOTE — PROGRESS NOTES
Outpatient Care Management  Plan of Care Follow Up Visit    Patient: Cheryl Solis  MRN: [id number]  Date of Service: 03/18/2025  Completed by: Nilsa Bain RN  Referral Date: 01/07/2025    No chief complaint on file.      Brief Summary: OPCM follow up call completed with Patient. Continued Education on Importance of Obtaining BP monitor and Performing Daily checks.   Next Steps: Patient agrees to follow up in approximately 2 weeks.

## 2025-03-31 RX ORDER — ICOSAPENT ETHYL 1 G/1
2 CAPSULE ORAL 2 TIMES DAILY
Qty: 120 CAPSULE | Refills: 11 | Status: SHIPPED | OUTPATIENT
Start: 2025-03-31

## 2025-04-01 ENCOUNTER — TELEPHONE (OUTPATIENT)
Dept: FAMILY MEDICINE | Facility: CLINIC | Age: 70
End: 2025-04-01
Payer: MEDICARE

## 2025-04-01 DIAGNOSIS — E55.9 VITAMIN D DEFICIENCY: ICD-10-CM

## 2025-04-01 DIAGNOSIS — Z00.00 MEDICARE ANNUAL WELLNESS VISIT, SUBSEQUENT: Primary | ICD-10-CM

## 2025-04-01 DIAGNOSIS — E11.42 CONTROLLED TYPE 2 DIABETES MELLITUS WITH DIABETIC POLYNEUROPATHY, WITHOUT LONG-TERM CURRENT USE OF INSULIN: ICD-10-CM

## 2025-04-02 ENCOUNTER — OUTPATIENT CASE MANAGEMENT (OUTPATIENT)
Dept: ADMINISTRATIVE | Facility: OTHER | Age: 70
End: 2025-04-02
Payer: MEDICARE

## 2025-04-02 NOTE — PROGRESS NOTES
Outpatient Care Management  Plan of Care Follow Up Visit    Patient: Cheryl Solis  MRN: [id number]  Date of Service: 04/02/2025  Completed by: Nilsa Bain RN  Referral Date: 01/07/2025    Reason for Visit   Patient presents with    Nursing Assessment    OPCM Chart Review       Brief Summary: OPCM follow up call completed with Patient. Continued Education on Daily Monitoring of BP and also Engaging Daily with Exercising.  Next Steps: Patient agrees to follow up in approximately 3 weeks.

## 2025-04-07 ENCOUNTER — OFFICE VISIT (OUTPATIENT)
Dept: FAMILY MEDICINE | Facility: CLINIC | Age: 70
End: 2025-04-07
Payer: MEDICARE

## 2025-04-07 ENCOUNTER — APPOINTMENT (OUTPATIENT)
Dept: LAB | Facility: HOSPITAL | Age: 70
End: 2025-04-07
Payer: MEDICARE

## 2025-04-07 ENCOUNTER — RESULTS FOLLOW-UP (OUTPATIENT)
Dept: FAMILY MEDICINE | Facility: CLINIC | Age: 70
End: 2025-04-07

## 2025-04-07 VITALS
DIASTOLIC BLOOD PRESSURE: 65 MMHG | HEART RATE: 61 BPM | SYSTOLIC BLOOD PRESSURE: 105 MMHG | BODY MASS INDEX: 29.06 KG/M2 | HEIGHT: 60 IN | WEIGHT: 148 LBS | OXYGEN SATURATION: 96 % | TEMPERATURE: 98 F | RESPIRATION RATE: 16 BRPM

## 2025-04-07 DIAGNOSIS — R79.89 LOW TSH LEVEL: ICD-10-CM

## 2025-04-07 DIAGNOSIS — I73.9 PERIPHERAL VASCULAR DISEASE, UNSPECIFIED: ICD-10-CM

## 2025-04-07 DIAGNOSIS — Z79.899 DRUG-INDUCED IMMUNODEFICIENCY: ICD-10-CM

## 2025-04-07 DIAGNOSIS — I15.2 HYPERTENSION ASSOCIATED WITH DIABETES: ICD-10-CM

## 2025-04-07 DIAGNOSIS — E11.69 HYPERLIPIDEMIA ASSOCIATED WITH TYPE 2 DIABETES MELLITUS: ICD-10-CM

## 2025-04-07 DIAGNOSIS — R94.6 ABNORMAL RESULTS OF THYROID FUNCTION STUDIES: ICD-10-CM

## 2025-04-07 DIAGNOSIS — D57.3 SICKLE-CELL TRAIT: ICD-10-CM

## 2025-04-07 DIAGNOSIS — E11.42 CONTROLLED TYPE 2 DIABETES MELLITUS WITH DIABETIC POLYNEUROPATHY, WITHOUT LONG-TERM CURRENT USE OF INSULIN: ICD-10-CM

## 2025-04-07 DIAGNOSIS — Z12.31 VISIT FOR SCREENING MAMMOGRAM: ICD-10-CM

## 2025-04-07 DIAGNOSIS — L68.0 HIRSUTISM: ICD-10-CM

## 2025-04-07 DIAGNOSIS — N18.32 STAGE 3B CHRONIC KIDNEY DISEASE: ICD-10-CM

## 2025-04-07 DIAGNOSIS — E55.9 VITAMIN D DEFICIENCY: Primary | ICD-10-CM

## 2025-04-07 DIAGNOSIS — E78.5 HYPERLIPIDEMIA ASSOCIATED WITH TYPE 2 DIABETES MELLITUS: ICD-10-CM

## 2025-04-07 DIAGNOSIS — E11.59 HYPERTENSION ASSOCIATED WITH DIABETES: ICD-10-CM

## 2025-04-07 DIAGNOSIS — D84.821 DRUG-INDUCED IMMUNODEFICIENCY: ICD-10-CM

## 2025-04-07 DIAGNOSIS — R41.3 SHORT-TERM MEMORY LOSS: ICD-10-CM

## 2025-04-07 DIAGNOSIS — Z00.00 MEDICARE ANNUAL WELLNESS VISIT, SUBSEQUENT: ICD-10-CM

## 2025-04-07 DIAGNOSIS — M35.1 MCTD (MIXED CONNECTIVE TISSUE DISEASE): ICD-10-CM

## 2025-04-07 DIAGNOSIS — Z00.00 MEDICARE ANNUAL WELLNESS VISIT, SUBSEQUENT: Primary | ICD-10-CM

## 2025-04-07 DIAGNOSIS — E55.9 VITAMIN D DEFICIENCY: ICD-10-CM

## 2025-04-07 LAB
25(OH)D3+25(OH)D2 SERPL-MCNC: 29 NG/ML (ref 30–80)
ALBUMIN SERPL-MCNC: 4.5 G/DL (ref 3.4–4.8)
ALBUMIN/GLOB SERPL: 1.2 RATIO (ref 1.1–2)
ALP SERPL-CCNC: 56 UNIT/L (ref 40–150)
ALT SERPL-CCNC: 7 UNIT/L (ref 0–55)
ANION GAP SERPL CALC-SCNC: 8 MEQ/L
AST SERPL-CCNC: 13 UNIT/L (ref 11–45)
BACTERIA #/AREA URNS AUTO: ABNORMAL /HPF
BASOPHILS # BLD AUTO: 0.05 X10(3)/MCL
BASOPHILS NFR BLD AUTO: 0.5 %
BILIRUB SERPL-MCNC: 0.5 MG/DL
BILIRUB UR QL STRIP.AUTO: NEGATIVE
BUN SERPL-MCNC: 24.8 MG/DL (ref 9.8–20.1)
CALCIUM SERPL-MCNC: 9.4 MG/DL (ref 8.4–10.2)
CHLORIDE SERPL-SCNC: 108 MMOL/L (ref 98–107)
CHOLEST SERPL-MCNC: 239 MG/DL
CHOLEST/HDLC SERPL: 4 {RATIO} (ref 0–5)
CK SERPL-CCNC: 94 U/L (ref 29–168)
CLARITY UR: CLEAR
CO2 SERPL-SCNC: 24 MMOL/L (ref 23–31)
COLOR UR AUTO: ABNORMAL
CREAT SERPL-MCNC: 1.61 MG/DL (ref 0.55–1.02)
CREAT UR-MCNC: 225 MG/DL (ref 45–106)
CREAT/UREA NIT SERPL: 15
EOSINOPHIL # BLD AUTO: 0.22 X10(3)/MCL (ref 0–0.9)
EOSINOPHIL NFR BLD AUTO: 2.3 %
ERYTHROCYTE [DISTWIDTH] IN BLOOD BY AUTOMATED COUNT: 12.4 % (ref 11.5–17)
EST. AVERAGE GLUCOSE BLD GHB EST-MCNC: 114 MG/DL
GFR SERPLBLD CREATININE-BSD FMLA CKD-EPI: 34 ML/MIN/1.73/M2
GLOBULIN SER-MCNC: 3.9 GM/DL (ref 2.4–3.5)
GLUCOSE SERPL-MCNC: 85 MG/DL (ref 82–115)
GLUCOSE UR QL STRIP: NORMAL
HBA1C MFR BLD: 5.6 %
HCT VFR BLD AUTO: 30.8 % (ref 37–47)
HDLC SERPL-MCNC: 67 MG/DL (ref 35–60)
HGB BLD-MCNC: 10.8 G/DL (ref 12–16)
HGB UR QL STRIP: NEGATIVE
HYALINE CASTS #/AREA URNS LPF: ABNORMAL /LPF
IMM GRANULOCYTES # BLD AUTO: 0.02 X10(3)/MCL (ref 0–0.04)
IMM GRANULOCYTES NFR BLD AUTO: 0.2 %
IRON SATN MFR SERPL: 26 % (ref 20–50)
IRON SERPL-MCNC: 63 UG/DL (ref 50–170)
KETONES UR QL STRIP: NEGATIVE
LDLC SERPL CALC-MCNC: 150 MG/DL (ref 50–140)
LEUKOCYTE ESTERASE UR QL STRIP: 25
LYMPHOCYTES # BLD AUTO: 2.48 X10(3)/MCL (ref 0.6–4.6)
LYMPHOCYTES NFR BLD AUTO: 26.4 %
MCH RBC QN AUTO: 28.6 PG (ref 27–31)
MCHC RBC AUTO-ENTMCNC: 35.1 G/DL (ref 33–36)
MCV RBC AUTO: 81.5 FL (ref 80–94)
MICROALBUMIN UR-MCNC: 19.6 UG/ML
MICROALBUMIN/CREAT RATIO PNL UR: 8.7 MG/GM CR (ref 0–30)
MONOCYTES # BLD AUTO: 0.64 X10(3)/MCL (ref 0.1–1.3)
MONOCYTES NFR BLD AUTO: 6.8 %
MUCOUS THREADS URNS QL MICRO: ABNORMAL /LPF
NEUTROPHILS # BLD AUTO: 6 X10(3)/MCL (ref 2.1–9.2)
NEUTROPHILS NFR BLD AUTO: 63.8 %
NITRITE UR QL STRIP: NEGATIVE
NRBC BLD AUTO-RTO: 0 %
PH UR STRIP: 5.5 [PH]
PLATELET # BLD AUTO: 316 X10(3)/MCL (ref 130–400)
PMV BLD AUTO: 10.9 FL (ref 7.4–10.4)
POTASSIUM SERPL-SCNC: 4.6 MMOL/L (ref 3.5–5.1)
PROT SERPL-MCNC: 8.4 GM/DL (ref 5.8–7.6)
PROT UR QL STRIP: ABNORMAL
RBC # BLD AUTO: 3.78 X10(6)/MCL (ref 4.2–5.4)
RBC #/AREA URNS AUTO: ABNORMAL /HPF
SODIUM SERPL-SCNC: 140 MMOL/L (ref 136–145)
SP GR UR STRIP.AUTO: 1.02 (ref 1–1.03)
SQUAMOUS #/AREA URNS LPF: ABNORMAL /HPF
TIBC SERPL-MCNC: 179 UG/DL (ref 70–310)
TIBC SERPL-MCNC: 242 UG/DL (ref 250–450)
TRANSFERRIN SERPL-MCNC: 216 MG/DL (ref 173–360)
TRIGL SERPL-MCNC: 111 MG/DL (ref 37–140)
TSH SERPL-ACNC: 0.18 UIU/ML (ref 0.35–4.94)
UROBILINOGEN UR STRIP-ACNC: NORMAL
VIT B12 SERPL-MCNC: 771 PG/ML (ref 213–816)
VLDLC SERPL CALC-MCNC: 22 MG/DL
WBC # BLD AUTO: 9.41 X10(3)/MCL (ref 4.5–11.5)
WBC #/AREA URNS AUTO: ABNORMAL /HPF

## 2025-04-07 PROCEDURE — 85025 COMPLETE CBC W/AUTO DIFF WBC: CPT

## 2025-04-07 PROCEDURE — 83550 IRON BINDING TEST: CPT

## 2025-04-07 PROCEDURE — 36415 COLL VENOUS BLD VENIPUNCTURE: CPT

## 2025-04-07 PROCEDURE — 81015 MICROSCOPIC EXAM OF URINE: CPT

## 2025-04-07 PROCEDURE — 82306 VITAMIN D 25 HYDROXY: CPT

## 2025-04-07 PROCEDURE — 84439 ASSAY OF FREE THYROXINE: CPT

## 2025-04-07 PROCEDURE — 83036 HEMOGLOBIN GLYCOSYLATED A1C: CPT

## 2025-04-07 PROCEDURE — 82550 ASSAY OF CK (CPK): CPT

## 2025-04-07 PROCEDURE — 80053 COMPREHEN METABOLIC PANEL: CPT

## 2025-04-07 PROCEDURE — 84443 ASSAY THYROID STIM HORMONE: CPT

## 2025-04-07 PROCEDURE — 80061 LIPID PANEL: CPT

## 2025-04-07 PROCEDURE — 82607 VITAMIN B-12: CPT

## 2025-04-07 PROCEDURE — 86376 MICROSOMAL ANTIBODY EACH: CPT

## 2025-04-07 PROCEDURE — 82570 ASSAY OF URINE CREATININE: CPT

## 2025-04-07 RX ORDER — SPIRONOLACTONE 25 MG/1
25 TABLET ORAL DAILY
Qty: 90 TABLET | Refills: 3 | Status: SHIPPED | OUTPATIENT
Start: 2025-04-07 | End: 2026-04-07

## 2025-04-07 RX ORDER — EFLORNITHINE HYDROCHLORIDE 139 MG/G
CREAM TOPICAL 2 TIMES DAILY WITH MEALS
Qty: 45 G | Refills: 5 | Status: SHIPPED | OUTPATIENT
Start: 2025-04-07 | End: 2026-04-07

## 2025-04-07 RX ORDER — CHOLECALCIFEROL (VITAMIN D3) 50 MCG
1 TABLET ORAL DAILY
COMMUNITY
Start: 2025-04-07 | End: 2025-04-17

## 2025-04-07 RX ORDER — DONEPEZIL HYDROCHLORIDE 5 MG/1
5 TABLET, FILM COATED ORAL NIGHTLY
Qty: 30 TABLET | Refills: 5 | Status: SHIPPED | OUTPATIENT
Start: 2025-04-07 | End: 2025-10-04

## 2025-04-07 NOTE — PROGRESS NOTES
Patient ID: [id number]     Chief Complaint: Medicare AWV        HPI:     Cheryl Solis is a 69 y.o. female here today for a Medicare Wellness.   The patient presents for well adult exam, The patient's general health status is described as good. The patient's diet is described as balanced. Exercise: occasional. Associated symptoms consist of denies weight loss, denies weight gain, denies fatigue, denies headache, denies hearing loss and denies vision changes. Last menstrual period: Menopause x . Additional pertinent history: last dental exam: 2015 (she reports that she cannot afford dental appointment at this time and she refuses services, risks of refusal discussed with patient.), last eye exam: 2025 (with Ophthalmology (Dr. Jerome)), last pap smear : 2017 (WNL with neg HPV at [facility]- Dr. Wells), last colonoscopy:2023 (with GI (Dr. Swanson)), she is due for Colonoscopy in , seat belt use, occasional caffeine use, tobacco use none, no alcohol use and She follows up with ENT (Dr. Temple) for thyroid nodules, which have been stable, follows up annually. She does followup with pain management and Rheumatology (Dr. Cooper) as scheduled for chronic pain. DM II is controlled with Rx, no adverse Rx side effects, asymptomatic, fasting CBGs have been 's, she denies polyuria, denies polydipsia, denies polydipsia. 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 HTN is controlled with Rx, no side effects, asymptomatic, she would like to be enrolled in digital medicine program, she would like care management referral. Hypercholesterolemia is controlled with Rx, no side effects, asymptomatic. COPD/Asthma is stable with current Rx, asymptomatic, followup by Pulmonary (Dr. Randall). Last MM2024, WNL, she needs scheduled, Last DEXA scan: 2024, WNL, due in 2026. She is due for annual labs, she has lab orders in file. She is UTD on  vaccines. She does followup with Vascular (Dr. Suh) as scheduled for cardiovascular management/ PAD/PVD, which are asymptomatic with Rx, needs Rx for DM II shoes. She is has a history of mixed connective tissue disorder/drug induced immunodeficiency, stable with Rx, followed by Rheumatology (Dr. Cooper).  - She complains of short term term memory loss x several years. She denies head injury.  Her maternal GM had Alzheimer's. She had NL CT head in 04/2025.   - She complains of excessive hair growth on her chin and face x several months. She hasn't tried any Rx for symptoms.   - Patient is without any other complaints today.    denies Urinary leakage.  denies Recent falls or balance difficulty.   admits to Daily exercise or physical activity.  denies Depression, stress, anxiety, or emotional lability.   denies The need for healthcare treatment including a cane/walker, blood pressure monitoring, or regular vision/hearing tests.     A separate E/M code has been provided to evaluate additional complaints that the patient would like addressed during the dedicated Medicare Wellness Exam.    Patient Care Team:  Monica Max MD as PCP - General (Family Medicine)  Monica Max MD [place], Patrick A., MD as Consulting Physician (Gastroenterology)  Valerie Hein OD (Optometry)  Nilson Jerome MD as Consulting Physician (Ophthalmology)  Brianna Moreno LPN as Care Coordinator  Nilsa Bain RN as Outpatient      Opioid Screening: Patient medication list reviewed, patient is taking prescription opioids from pain management. Patient is not using additional opioids than prescribed. Patient is at low risk of substance abuse based on this opioid use history.      Advance Care Planning     Date: 04/07/2025  Patient did not wish or was not able to name a surrogate decision maker or provide an Advance Care Plan.        -------------------------------------    Anemia, unspecified     Fibromyalgia    HTN (hypertension)    Mixed hyperlipidemia    Obesity, unspecified    Sickle-cell disease without crisis    Sleep apnea, unspecified    Type 2 diabetes mellitus without complications    Vitamin D deficiency        Past Surgical History:   Procedure Laterality Date    BACK SURGERY      CARPAL TUNNEL RELEASE Bilateral      SECTION       SECTION       SECTION      COLONOSCOPY      COLONOSCOPY W/ POLYPECTOMY  2013    FOOT SURGERY Left     lasar eye       SYMPATHECTOMY         Review of patient's allergies indicates:   Allergen Reactions    Metformin Rash     Rash (skin)^  Rash (skin)^      Morphine Itching     Itchy (skin)^  Itchy (skin)^      Shellfish containing products     Vascepa [icosapent ethyl] Hives    Ibuprofen Rash     Hives (skin)^  Hives (skin)^         Outpatient Medications Marked as Taking for the 25 encounter (Office Visit) with Monica Max MD   Medication Sig Dispense Refill    albuterol (PROVENTIL) 2.5 mg /3 mL (0.083 %) nebulizer solution use one vial in nebulizer every 4 to 6 hours as needed for wheezing 75 mL 11    albuterol (PROVENTIL/VENTOLIN HFA) 90 mcg/actuation inhaler Inhale 2 puffs into the lungs every 6 (six) hours as needed for Wheezing. Rescue 18 g 11    amlodipine-benazepril 5-10 mg (LOTREL) 5-10 mg per capsule TAKE ONE CAPSULE BY MOUTH DAILY 90 capsule 3    aspirin (ECOTRIN) 81 MG EC tablet TAKE ONE TABLET BY MOUTH DAILY 30 tablet 5    azelastine (OPTIVAR) 0.05 % ophthalmic solution Place 1 drop into both eyes 2 (two) times daily.      [id number]-W1-B7-folic-Q94-mvovid-K (B COMPLEX-VITAMIN C) 20 mg-5 mg- 2 mg-75 mcg Chew 1 tab(s) chewed once a day      blood pressure monitor Kit 1 each by Misc.(Non-Drug; Combo Route) route once daily. 1 each 0    blood sugar diagnostic Strp To check BG daily, to use with insurance preferred meter 100 strip 11    blood-glucose meter kit To check BG daily, to use with insurance preferred meter 1 each  0    celecoxib (CELEBREX) 200 MG capsule Take 200 mg by mouth once daily.      EPINEPHrine (EPIPEN) 0.3 mg/0.3 mL AtIn Inject 1 each into the muscle daily as needed.      evolocumab (REPATHA SURECLICK) 140 mg/mL PnIj Inject 1 mL (140 mg total) into the skin every 14 (fourteen) days. 2 mL 11    ferrous gluconate (FERGON) 324 MG tablet Take 1 tablet (324 mg total) by mouth 3 (three) times daily with meals. 90 tablet 11    fluticasone propionate (FLONASE) 50 mcg/actuation nasal spray 2 sprays by Each Nostril route once daily.      hydroCHLOROthiazide (HYDRODIURIL) 25 MG tablet TAKE ONE TABLET BY MOUTH TWICE DAILY 180 tablet 3    HYDROcodone-acetaminophen (NORCO)  mg per tablet Take 1 tablet by mouth every 4 (four) hours.      hydrOXYchloroQUINE (PLAQUENIL) 200 mg tablet Take 1 tablet (200 mg total) by mouth 2 (two) times daily. 180 tablet 3    hydrOXYzine HCL (ATARAX) 25 MG tablet Take 25 mg by mouth.      icosapent ethyL (VASCEPA) 1 gram Cap Take 2 capsules (2,000 mg total) by mouth 2 (two) times daily. 120 capsule 11    JANUMET  mg per tablet Take 1 tablet by mouth 2 (two) times daily with meals. 180 tablet 3    lancets Misc To check BG daily, to use with insurance preferred meter 100 each 11    levocetirizine (XYZAL) 5 MG tablet Take 5 mg by mouth every evening.      montelukast (SINGULAIR) 10 mg tablet Take 10 mg by mouth once daily.      nortriptyline (PAMELOR) 10 MG capsule Take 10 mg by mouth every evening.      ondansetron (ZOFRAN) 4 MG tablet Take 1 tablet (4 mg total) by mouth every 6 (six) hours. 12 tablet 0    pregabalin (LYRICA) 100 MG capsule Take 100 mg by mouth 2 (two) times daily.      tiZANidine 4 mg Cap 1 tablet as needed Orally twice a day      topiramate (TOPAMAX) 100 MG tablet Take 100 mg by mouth as needed.      traZODone (DESYREL) 50 MG tablet Take 50 mg by mouth nightly as needed.      UBRELVY 100 mg tablet Take 100 mg by mouth as needed.         Social History[1]     Family History    Problem Relation Name Age of Onset    Cancer Mother      Stroke Sister      Hypertension Sister      Drug abuse Brother      Alcohol abuse Brother          Subjective:     ROS      See HPI for details    Constitutional: No fever, No chills, No fatigue.   Eye: No blurring, No visual disturbances.   Ear/Nose/Mouth/Throat: No decreased hearing, No ear pain, No nasal congestion, No sore throat.   Respiratory: No shortness of breath, No cough, No wheezing.   Cardiovascular: No chest pain, No palpitations, No peripheral edema.   Breast: Both breasts, No lump/ mass, No pain.   Nipple discharge: None.   Gastrointestinal: No nausea, No vomiting, No diarrhea, No constipation, No abdominal pain.   Genitourinary: No dysuria, No hematuria.   Gynecologic: Negative except as documented in history of present illness.   Hematology/Lymphatics: No bruising tendency, No bleeding tendency, No swollen lymph glands.   Endocrine: No excessive thirst, No polyuria, No excessive hunger.   Musculoskeletal: Joint pain, Muscle pain, No decreased range of motion.   Integumentary: As per HPI.  Neurologic: As per HPI. No abnormal balance, No confusion, No headache.   Psychiatric: No anxiety, No depression, Not suicidal, No hallucinations.     All Other ROS: Negative except as stated in HPI.       Objective:     /65 (BP Location: Right arm, Patient Position: Sitting)   Pulse 61   Temp 97.6 °F (36.4 °C) (Oral)   Resp 16   Ht 5' (1.524 m)   Wt 67.1 kg (148 lb)   SpO2 96%   BMI 28.90 kg/m²     Physical Exam    General: Alert and oriented, No acute distress.   Appearance: Overweight.    Eye: Pupils are equal, round and reactive to light, Extraocular movements are intact, Normal conjunctiva.   HENT: Normocephalic, Tympanic membranes are clear, Normal hearing, Oral mucosa is moist, No pharyngeal erythema.   Throat: Pharynx ( Not edematous, No exudate ).   Neck: Supple, Non-tender, No carotid bruit, No lymphadenopathy, No thyromegaly.    Respiratory: Lungs are clear to auscultation, Respirations are non-labored, Breath sounds are equal, Symmetrical chest wall expansion, No chest wall tenderness.   Cardiovascular: Normal rate, Regular rhythm, No murmur, Good pulses equal in all extremities, No edema.   Gastrointestinal: Soft, Non-tender, Non-distended, Normal bowel sounds, No organomegaly.   Genitourinary: No costovertebral angle tenderness.   Musculoskeletal: Normal range of motion, Normal gait.   Integumentary: Hair growth on chin.  Neurologic: No focal deficits, Cranial Nerves II-XII are grossly intact.  Protective Sensation (w/ 10 gram monofilament):  Right: Intact  Left: Intact     Visual Inspection:  Callus -  None, WNL     Pedal Pulses:   Right: Present  Left: Present     Posterior Tibialis Pulses:   Right:Present  Left: Present   Psychiatric: Cooperative, Appropriate mood & affect, Normal judgment, Non-suicidal.   Mood and affect: Calm.   Behavior: Relaxed.       Assessment:       ICD-10-CM ICD-9-CM   1. Medicare annual wellness visit, subsequent  Z00.00 V70.0   2. Visit for screening mammogram  Z12.31 V76.12   3. Controlled type 2 diabetes mellitus with diabetic polyneuropathy, without long-term current use of insulin  E11.42 250.60     357.2   4. Hypertension associated with diabetes  E11.59 250.80    I15.2 401.9   5. Hyperlipidemia associated with type 2 diabetes mellitus  E11.69 250.80    E78.5 272.4   6. Stage 3b chronic kidney disease  N18.32 585.3   7. MCTD (mixed connective tissue disease)  M35.1 710.8   8. Drug-induced immunodeficiency  D84.821 279.3    Z79.899 E947.9   9. Sickle-cell trait  D57.3 282.5   10. Peripheral vascular disease, unspecified  I73.9 443.9   11. Hirsutism  L68.0 704.1   12. Short-term memory loss  R41.3 780.93        Plan:       Medicare Annual Wellness and Personalized Prevention Plan:     Fall Risk + Home Safety + Hearing Impairment + Depression Screen + Cognitive Impairment Screen + Health Risk Assessment  all reviewed.          No data to display                  4/7/2025    11:05 AM 1/10/2025    12:43 PM 12/12/2024     9:42 AM 9/4/2024    10:24 AM 8/2/2024    10:45 AM 6/5/2024    10:50 AM 5/2/2024     9:15 AM   Depression Screeening PHQ2   Over the last two weeks how often have you been bothered by little interest or pleasure in doing things 0 0 0 0 0 0 0   Over the last two weeks how often have you been bothered by feeling down, depressed or hopeless 0 0 0 0 0 0 0   PHQ-2 Total Score 0 0 0 0 0 0 0         4/7/2025    10:30 AM 12/12/2024     9:30 AM 9/4/2024    10:30 AM 8/2/2024    10:30 AM 6/5/2024    10:15 AM 5/2/2024     9:00 AM 4/5/2024     9:00 AM   Fall Risk Assessment - Outpatient   Mobility Status Ambulatory Ambulatory Ambulatory Ambulatory Ambulatory Ambulatory Ambulatory   Number of falls 0 0 0 0 0 0 0   Identified as fall risk False False False False False False False                               Alcohol/Tobacco Use - Stressed importance of smoking cessation and limiting alcohol intake.  CVD Risk Factors - Reviewed  Obesity/Physical Activity - Normal BMI. Encouraged daily 30 minute physical activity x 5 days per week.      Health Maintenance Topics with due status: Not Due       Topic Last Completion Date    TETANUS VACCINE 09/15/2017    Colorectal Cancer Screening 07/24/2023    DEXA Scan 04/17/2024    Diabetic Eye Exam 07/08/2024    Foot Exam 04/07/2025        Vaccinations -   Immunization History   Administered Date(s) Administered    COVID-19 MRNA, LN-S PF (MODERNA HALF 0.25 ML DOSE) 11/08/2021, 04/04/2022    COVID-19 Vaccine 04/04/2022    COVID-19, MRNA, LN-S, PF (MODERNA FULL 0.5 ML DOSE) 02/09/2021, 03/09/2021    COVID-19, mRNA, LNP-S, PF (Moderna) Ages 12+ 12/05/2023    COVID-19, mRNA, LNP-S, PF, miranda-sucrose, 30 mcg/0.3 mL (Pfizer Ages 12+) 07/30/2024    COVID-19, mRNA, LNP-S, bivalent booster, PF (Moderna Omicron)12 + YEARS 11/21/2022    DTP 1954, 1954, 06/03/1965, 09/15/1966,  03/01/1977, 11/19/1987    Influenza (FLUBLOK) - Quadrivalent - Recombinant - PF *Preferred* (egg allergy) 01/13/2014, 10/03/2018    Influenza - Quadrivalent 01/13/2014    Influenza - Quadrivalent - High Dose - PF (65 years and older) 11/06/2020    Influenza - Quadrivalent - PF *Preferred* (6 months and older) 09/15/2017, 12/05/2023    Influenza - Trivalent - Afluria, Fluzone MDV 09/15/2017, 11/21/2022    Influenza - Trivalent - Fluarix, Flulaval, Fluzone, Afluria - PF 01/13/2014, 09/24/2014, 09/15/2017, 11/08/2019, 11/06/2020, 11/17/2020    Influenza - Trivalent - Fluzone High Dose - PF (65 years and older) 11/08/2019    OPV 1954, 05/03/1960, 06/02/1960, 06/01/1961, 09/18/1969    Pneumococcal Conjugate - 13 Valent 04/13/2015    Pneumococcal Polysaccharide - 23 Valent 08/20/2019    Rsv, Bivalent, Rsvpref (Abrysvo) 02/15/2024    Tdap 11/20/2014, 09/15/2017, 09/15/2017    Zoster 11/03/2014, 11/28/2018, 02/04/2019    Zoster Recombinant 11/28/2018, 02/04/2019          1. Medicare annual wellness visit, subsequent  - Patient to have wellness labs done today. Will treat pending lab results. Monthly breast self exam encouraged. Diet, exercise, and 10% weight loss encouraged. Keep appointment for dental exams x q6 months as scheduled. Keep appointment for annual eye exam as scheduled. Keep appointment with specialists as scheduled. Notify M.D. or ER if temp greater than 100.4, or any acute illness.      2. Visit for screening mammogram  - Mammo Digital Screening Bilat w/ Mehdi (XPD); Future    3. Controlled type 2 diabetes mellitus with diabetic polyneuropathy, without long-term current use of insulin  - Rx for DIABETIC SHOES FOR HOME USE  - Continue Janumet as prescribed. Will titrate Rx pending results. Keep daily fasting CBG log. Continue Lyrica for neuropathy. Keep appointment for annual eye exam as scheduled. Notify M.D. or ER if CBG >400 or <60, polyuria, polydipsia, or polyphagia.   Lab Results   Component Value  Date    HGBA1C 5.5 04/17/2024      Continue   Follow ADA Diet. Avoid soda, simple sweets, and limit rice/pasta/breads/starches.  Maintain healthy weight with goal BMI <30.  Exercise 5 times per week for 30 minutes per day.  Stressed importance of daily foot exams.  Stressed importance of annual dilated eye exam.     4. Hypertension associated with diabetes  - BP is well controlled. Continue Lotrel, HCTZ as prescribed. Continue followup with Cardiology as scheduled. Keep daily BP log. Notify M.D. or ER if BP >170/100 or <90/60, chest pain, palpitations, headache, SOB, temp greater than 100.4, or any acute illness.   Continue  Low Sodium Diet (DASH Diet - Less than 2 grams of sodium per day).  Monitor blood pressure daily and log. Report consistent numbers greater than 140/90.  Smoking cessation encouraged to aid in BP reduction.  Maintain healthy weight with goal BMI <30. Exercise 30 minutes per day, 5 days per week.      5. Hyperlipidemia associated with type 2 diabetes mellitus  - Continue VytorinTM and Repatha as prescribed. Will treat pending results.   Continue  Stressed importance of dietary modifications. Follow a low cholesterol, low saturated fat diet with less that 200mg of cholesterol a day.  Avoid fried foods and high saturated fats (high saturated fats less than 7% of calories).  Add Flax Seed/Fish Oil supplements to diet. Increase dietary fiber.  Regular exercise can reduce LDL and raise HDL. Stressed importance of physical activity 5 times per week for 30 minutes per day.      6. Stage 3b chronic kidney disease  - Stable, continue to monitor.   Stable from renal standpoint.  Continue  Follow renoprotective measures including Renal Diet (reduce intake of nuts, peanut butter, milk, cheese, dried beans, peas) and Low Sodium Diet (less than 2 grams per day).  Avoid NSAIDs (Aleve, Mobic, Celebrex, Ibuprofen, Advil, Toradol and Diclofenac). May take Tylenol as needed for headache/pain.  Control DM with goal  A1C <7. BP goal <130/80. LDL goal < 100.  Stay well hydrated. Avoid alcohol and soda. Limit tea and coffee.    7. MCTD (mixed connective tissue disease)  - Stable, continue followup with Rheumatology as scheduled.     8. Drug-induced immunodeficiency  - Stable, continue followup with Rheumatology as scheduled.     9. Sickle-cell trait  - Stable, continue to monitor.      10. Peripheral vascular disease, unspecified  - DIABETIC SHOES FOR HOME USE   - Asymptomatic, continue followup with Vascular as scheduled.     11. Hirsutism  - Rx trial of spironolactone (ALDACTONE) 25 MG tablet; Take 1 tablet (25 mg total) by mouth once daily.  Dispense: 90 tablet; Refill: 3  - Rx trial of eflornithine (VANIQA) 13.9 % cream; Apply topically 2 (two) times daily with meals.  Dispense: 45 g; Refill: 5  - If no improvement, will proceed with Dermatology referral.     12. Short-term memory loss  - Rx trial of donepeziL (ARICEPT) 5 MG tablet; Take 1 tablet (5 mg total) by mouth every evening.  Dispense: 30 tablet; Refill: 5; will titrate Rx as needed/tolerated. Will proceed with Neurology referral if no improvement. Notify M.D. or ER if symptoms persist or worsen, temp >100.4, or any acute illness.          Medication List with Changes/Refills   New Medications    DONEPEZIL (ARICEPT) 5 MG TABLET    Take 1 tablet (5 mg total) by mouth every evening.       Start Date: 4/7/2025  End Date: 10/4/2025    EFLORNITHINE (VANIQA) 13.9 % CREAM    Apply topically 2 (two) times daily with meals.       Start Date: 4/7/2025  End Date: 4/7/2026    SPIRONOLACTONE (ALDACTONE) 25 MG TABLET    Take 1 tablet (25 mg total) by mouth once daily.       Start Date: 4/7/2025  End Date: 4/7/2026   Current Medications    ALBUTEROL (PROVENTIL) 2.5 MG /3 ML (0.083 %) NEBULIZER SOLUTION    use one vial in nebulizer every 4 to 6 hours as needed for wheezing       Start Date: 8/26/2024 End Date: --    ALBUTEROL (PROVENTIL/VENTOLIN HFA) 90 MCG/ACTUATION INHALER    Inhale  2 puffs into the lungs every 6 (six) hours as needed for Wheezing. Rescue       Start Date: 8/28/2024 End Date: --    AMLODIPINE-BENAZEPRIL 5-10 MG (LOTREL) 5-10 MG PER CAPSULE    TAKE ONE CAPSULE BY MOUTH DAILY       Start Date: 8/26/2024 End Date: --    ASPIRIN (ECOTRIN) 81 MG EC TABLET    TAKE ONE TABLET BY MOUTH DAILY       Start Date: 5/25/2023 End Date: --    AZELASTINE (OPTIVAR) 0.05 % OPHTHALMIC SOLUTION    Place 1 drop into both eyes 2 (two) times daily.       Start Date: --        End Date: --    Z4-W2-I1-B7-FOLIC-W25-YEIUYR-S (B COMPLEX-VITAMIN C) 20 MG-5 MG- 2 MG-75 MCG CHEW    1 tab(s) chewed once a day       Start Date: --        End Date: --    BLOOD PRESSURE MONITOR KIT    1 each by Misc.(Non-Drug; Combo Route) route once daily.       Start Date: 1/31/2025 End Date: --    BLOOD SUGAR DIAGNOSTIC STRP    To check BG daily, to use with insurance preferred meter       Start Date: 8/2/2024  End Date: --    BLOOD-GLUCOSE METER KIT    To check BG daily, to use with insurance preferred meter       Start Date: 8/2/2024  End Date: --    CELECOXIB (CELEBREX) 200 MG CAPSULE    Take 200 mg by mouth once daily.       Start Date: --        End Date: --    EPINEPHRINE (EPIPEN) 0.3 MG/0.3 ML ATIN    Inject 1 each into the muscle daily as needed.       Start Date: 4/25/2022 End Date: --    EVOLOCUMAB (REPATHA SURECLICK) 140 MG/ML PNIJ    Inject 1 mL (140 mg total) into the skin every 14 (fourteen) days.       Start Date: 4/11/2024 End Date: 4/11/2025    FERROUS GLUCONATE (FERGON) 324 MG TABLET    Take 1 tablet (324 mg total) by mouth 3 (three) times daily with meals.       Start Date: 4/17/2024 End Date: --    FLUTICASONE PROPIONATE (FLONASE) 50 MCG/ACTUATION NASAL SPRAY    2 sprays by Each Nostril route once daily.       Start Date: 6/3/2022  End Date: --    GABAPENTIN (NEURONTIN) 100 MG CAPSULE    Take 1 capsule (100 mg total) by mouth 3 (three) times daily.       Start Date: 6/27/2023 End Date: 12/12/2024     HYDROCHLOROTHIAZIDE (HYDRODIURIL) 25 MG TABLET    TAKE ONE TABLET BY MOUTH TWICE DAILY       Start Date: 8/26/2024 End Date: --    HYDROCODONE-ACETAMINOPHEN (NORCO)  MG PER TABLET    Take 1 tablet by mouth every 4 (four) hours.       Start Date: 6/3/2022  End Date: --    HYDROXYCHLOROQUINE (PLAQUENIL) 200 MG TABLET    Take 1 tablet (200 mg total) by mouth 2 (two) times daily.       Start Date: 6/27/2023 End Date: --    HYDROXYZINE HCL (ATARAX) 25 MG TABLET    Take 25 mg by mouth.       Start Date: --        End Date: --    ICOSAPENT ETHYL (VASCEPA) 1 GRAM CAP    Take 2 capsules (2,000 mg total) by mouth 2 (two) times daily.       Start Date: 3/31/2025 End Date: --    JANUMET  MG PER TABLET    Take 1 tablet by mouth 2 (two) times daily with meals.       Start Date: 4/8/2024  End Date: 4/8/2025    LANCETS MISC    To check BG daily, to use with insurance preferred meter       Start Date: 8/2/2024  End Date: --    LEVOCETIRIZINE (XYZAL) 5 MG TABLET    Take 5 mg by mouth every evening.       Start Date: --        End Date: --    MONTELUKAST (SINGULAIR) 10 MG TABLET    Take 10 mg by mouth once daily.       Start Date: 6/22/2021 End Date: --    NORTRIPTYLINE (PAMELOR) 10 MG CAPSULE    Take 10 mg by mouth every evening.       Start Date: --        End Date: --    OMEPRAZOLE (PRILOSEC) 40 MG CAPSULE    Take 1 capsule (40 mg total) by mouth once daily. Before lunch       Start Date: 6/27/2023 End Date: 6/26/2024    ONDANSETRON (ZOFRAN) 4 MG TABLET    Take 1 tablet (4 mg total) by mouth every 6 (six) hours.       Start Date: 3/20/2023 End Date: --    PREGABALIN (LYRICA) 100 MG CAPSULE    Take 100 mg by mouth 2 (two) times daily.       Start Date: --        End Date: --    TIZANIDINE 4 MG CAP    1 tablet as needed Orally twice a day       Start Date: --        End Date: --    TOPIRAMATE (TOPAMAX) 100 MG TABLET    Take 100 mg by mouth as needed.       Start Date: --        End Date: --    TRAZODONE (DESYREL) 50 MG  TABLET    Take 50 mg by mouth nightly as needed.       Start Date: --        End Date: --    UBRELVY 100 MG TABLET    Take 100 mg by mouth as needed.       Start Date: 11/29/2024End Date: --          The patient's Health Maintenance was reviewed and the following appears to be due at this time:   Health Maintenance Due   Topic Date Due    Hemoglobin A1c  10/17/2024    Diabetes Urine Screening  04/17/2025    Mammogram  04/17/2025    Lipid Panel  04/17/2025         Follow up in about 6 months (around 10/7/2025) for Diabetes Followup, Cholesterol Followup, HTN Followup.            [1]   Social History  Socioeconomic History    Marital status:    Tobacco Use    Smoking status: Never     Passive exposure: Never    Smokeless tobacco: Never   Substance and Sexual Activity    Alcohol use: Not Currently    Drug use: Never    Sexual activity: Yes     Social Drivers of Health     Financial Resource Strain: Low Risk  (1/10/2025)    Overall Financial Resource Strain (CARDIA)     Difficulty of Paying Living Expenses: Not very hard   Food Insecurity: No Food Insecurity (1/10/2025)    Hunger Vital Sign     Worried About Running Out of Food in the Last Year: Never true     Ran Out of Food in the Last Year: Never true   Transportation Needs: No Transportation Needs (1/10/2025)    TRANSPORTATION NEEDS     Transportation : No   Physical Activity: Inactive (1/10/2025)    Exercise Vital Sign     Days of Exercise per Week: 0 days     Minutes of Exercise per Session: 0 min   Stress: No Stress Concern Present (1/10/2025)    [employer] [place] [employer] - Occupational Stress Questionnaire     Feeling of Stress : Only a little   Housing Stability: Unknown (1/10/2025)    Housing Stability Vital Sign     Unable to Pay for Housing in the Last Year: No     Homeless in the Last Year: No

## 2025-04-08 ENCOUNTER — TELEPHONE (OUTPATIENT)
Dept: FAMILY MEDICINE | Facility: CLINIC | Age: 70
End: 2025-04-08
Payer: MEDICARE

## 2025-04-08 DIAGNOSIS — E11.69 HYPERLIPIDEMIA ASSOCIATED WITH TYPE 2 DIABETES MELLITUS: ICD-10-CM

## 2025-04-08 DIAGNOSIS — E78.5 HYPERLIPIDEMIA ASSOCIATED WITH TYPE 2 DIABETES MELLITUS: ICD-10-CM

## 2025-04-08 LAB — T4 FREE SERPL-MCNC: 1.06 NG/DL (ref 0.7–1.48)

## 2025-04-08 NOTE — TELEPHONE ENCOUNTER
----- Message from Monica Max MD sent at 4/8/2025 12:38 PM CDT -----  Free T4 (thyroid lab)  is normal.     Thank you for choosing [facility] for your medical needs. Have a great day!   -Monica Max MD, [facility]    ----- Message -----  From: Lab, Background User  Sent: 4/7/2025   5:01 PM CDT  To: Monica Max MD

## 2025-04-08 NOTE — TELEPHONE ENCOUNTER
----- Message from Monica Max MD sent at 4/7/2025  6:55 PM CDT -----  - Vitamin D is mildly low, 29, normal: 30-80. Vitamin D deficiency means you don't have enough vitamin D in your body. It primarily causes issues with your bones and muscles, including pain, weakness   and fatigue. You can get vitamin D in a variety of ways, including:  - Sun exposure on your skin (however, people with darker skin and older people may not get enough vitamin D through sunlight. Your geographical location may also prevent adequate vitamin D exposure   through sunlight).  - Through the food you eat.  - Through nutritional supplements.  Despite all these methods to get vitamin D, vitamin D deficiency is a common worldwide problem.  He needs to take an over-the-counter vitamin D3 supplement 2000 units once daily, recheck Vitamin D level with a annual wellness labs.    -  She has trace protein in her urine, she is mildly anemic, and she is in stage 3 out of 5 for chronic kidney disease (GFR: 34, goal >60, was >60 previously, please forward results to her   Nephrologist (Eder Rivera NP) for review/treatment recommendations.   Continue  Follow renoprotective measures including Renal Diet (reduce intake of nuts, peanut butter, milk, cheese, dried beans, peas) and Low Sodium Diet (less than 2 grams per day).  Avoid NSAIDs (Aleve, Mobic, Celebrex, Ibuprofen, Advil, Toradol and Diclofenac). May take Tylenol as needed for headache/pain.  Control DM with goal A1C <7. BP goal <130/80. LDL goal < 100.  Stay well hydrated. Avoid alcohol and soda. Limit tea and coffee.    - Iron panel and Vitamin B12 level are normal.     - Cholesterol is not controlled. LDL (bad cholesterol): 150, goal is <100 in diabetes, please advise if patient is taking a statin cholesterol medication and Repatha as listed on her medication list   for further recommendations.     - Diabetes is well controlled, hemoglobin A1c is 5.6%, goal is less than 7.0%,  continue current diabetes treatment plan, recheck CMP and hemoglobin A1c in 10/2025.    - TSH (thyroid test) is mildly low, this can be seen with overactive thyroid issue.  Order to add free T4 and thyroid antibody panel to lab is in file for further recommendation.  My office staff   will contact the lab to add.     - Remaining labs are stable from previous and are essentially normal.    Thank you for choosing [facility] for your medical needs. Have a great day!   -Monica Max MD, [facility]    ----- Message -----  From: Lab, Background User  Sent: 4/7/2025   5:01 PM CDT  To: Monica Max MD

## 2025-04-09 DIAGNOSIS — I10 PRIMARY HYPERTENSION: ICD-10-CM

## 2025-04-09 DIAGNOSIS — N18.2 CKD (CHRONIC KIDNEY DISEASE) STAGE 2, GFR 60-89 ML/MIN: Primary | ICD-10-CM

## 2025-04-10 ENCOUNTER — DOCUMENTATION ONLY (OUTPATIENT)
Dept: FAMILY MEDICINE | Facility: CLINIC | Age: 70
End: 2025-04-10
Payer: MEDICARE

## 2025-04-10 LAB
THYROGLOB AB SERPL IA-ACNC: 25 IU/ML
THYROID PEROXIDASE QUANT (OLG): <4 IU/ML

## 2025-04-11 ENCOUNTER — TELEPHONE (OUTPATIENT)
Dept: FAMILY MEDICINE | Facility: CLINIC | Age: 70
End: 2025-04-11
Payer: MEDICARE

## 2025-04-11 NOTE — TELEPHONE ENCOUNTER
----- Message from Monica Max MD sent at 4/10/2025  2:06 PM CDT -----  Thyroid antibody testing is negative for auto-immune disease.      Thank you for choosing [facility] for your medical needs. Have a great day!   -Monica Max MD, [facility]    ----- Message -----  From: Lab, Background User  Sent: 4/7/2025   5:01 PM CDT  To: Monica Max MD

## 2025-04-14 ENCOUNTER — LAB VISIT (OUTPATIENT)
Dept: LAB | Facility: HOSPITAL | Age: 70
End: 2025-04-14
Payer: MEDICARE

## 2025-04-14 ENCOUNTER — RESULTS FOLLOW-UP (OUTPATIENT)
Dept: NEPHROLOGY | Facility: CLINIC | Age: 70
End: 2025-04-14
Payer: MEDICARE

## 2025-04-14 DIAGNOSIS — N18.2 CKD (CHRONIC KIDNEY DISEASE) STAGE 2, GFR 60-89 ML/MIN: ICD-10-CM

## 2025-04-14 DIAGNOSIS — I10 PRIMARY HYPERTENSION: ICD-10-CM

## 2025-04-14 LAB
ALBUMIN SERPL-MCNC: 4.1 G/DL (ref 3.4–4.8)
ALBUMIN/GLOB SERPL: 1.1 RATIO (ref 1.1–2)
ALP SERPL-CCNC: 54 UNIT/L (ref 40–150)
ALT SERPL-CCNC: 8 UNIT/L (ref 0–55)
ANION GAP SERPL CALC-SCNC: 7 MEQ/L
AST SERPL-CCNC: 12 UNIT/L (ref 11–45)
BILIRUB SERPL-MCNC: 0.4 MG/DL
BUN SERPL-MCNC: 33.2 MG/DL (ref 9.8–20.1)
CALCIUM SERPL-MCNC: 9.7 MG/DL (ref 8.4–10.2)
CHLORIDE SERPL-SCNC: 108 MMOL/L (ref 98–107)
CO2 SERPL-SCNC: 25 MMOL/L (ref 23–31)
CREAT SERPL-MCNC: 1.77 MG/DL (ref 0.55–1.02)
CREAT/UREA NIT SERPL: 19
GFR SERPLBLD CREATININE-BSD FMLA CKD-EPI: 31 ML/MIN/1.73/M2
GLOBULIN SER-MCNC: 3.6 GM/DL (ref 2.4–3.5)
GLUCOSE SERPL-MCNC: 96 MG/DL (ref 82–115)
POTASSIUM SERPL-SCNC: 4 MMOL/L (ref 3.5–5.1)
PROT SERPL-MCNC: 7.7 GM/DL (ref 5.8–7.6)
SODIUM SERPL-SCNC: 140 MMOL/L (ref 136–145)

## 2025-04-14 PROCEDURE — 80053 COMPREHEN METABOLIC PANEL: CPT

## 2025-04-14 PROCEDURE — 36415 COLL VENOUS BLD VENIPUNCTURE: CPT

## 2025-04-17 ENCOUNTER — OFFICE VISIT (OUTPATIENT)
Dept: NEPHROLOGY | Facility: CLINIC | Age: 70
End: 2025-04-17
Payer: MEDICARE

## 2025-04-17 VITALS
BODY MASS INDEX: 28.98 KG/M2 | TEMPERATURE: 99 F | WEIGHT: 147.63 LBS | HEIGHT: 60 IN | OXYGEN SATURATION: 100 % | SYSTOLIC BLOOD PRESSURE: 105 MMHG | HEART RATE: 66 BPM | DIASTOLIC BLOOD PRESSURE: 64 MMHG | RESPIRATION RATE: 20 BRPM

## 2025-04-17 DIAGNOSIS — I10 PRIMARY HYPERTENSION: ICD-10-CM

## 2025-04-17 DIAGNOSIS — N17.9 AKI (ACUTE KIDNEY INJURY): Primary | ICD-10-CM

## 2025-04-17 DIAGNOSIS — N18.2 CKD (CHRONIC KIDNEY DISEASE) STAGE 2, GFR 60-89 ML/MIN: ICD-10-CM

## 2025-04-17 DIAGNOSIS — E55.9 VITAMIN D DEFICIENCY: ICD-10-CM

## 2025-04-17 PROCEDURE — 99215 OFFICE O/P EST HI 40 MIN: CPT | Mod: PBBFAC

## 2025-04-17 PROCEDURE — 99999 PR PBB SHADOW E&M-EST. PATIENT-LVL V: CPT | Mod: PBBFAC,,,

## 2025-04-17 RX ORDER — CYANOCOBALAMIN/FOLIC AC/VIT B6 1-2.5-25MG
TABLET ORAL DAILY
COMMUNITY
Start: 2025-04-15

## 2025-04-17 NOTE — PATIENT INSTRUCTIONS
Discontinue spironolactone  Discontinue hydrochlorothiazide    Start vitamin-D supplementation over-the-counter=  vitamin D3 2000 units daily    Monitor blood pressure and let us know if not at goal before your follow up    Increase water intake    Blood pressure goal: consistently less than 130/85      Avoid NSAIDs and COX2 inhibitors: Advil (ibuprofen), Aleve (naproxen), Mobic (meloxicam), Celebrex (celecoxib), Toradol (ketorolac) and Diclofenac (voltaren), Indomethacin (indocin).    Only take tylenol (acetaminophen) occasionally if needed for aches/pains.    Stay well hydrated with water: goal is around 64 ounces per day of pure water    Recommend low sodium diet:  Less than 2,000 mg per day  Avoid high salt foods (olives, pickles, smoked meats, deli meats, chips, fast food, etc.).   Do not add salt to your food at the table.   Use only small amounts of salt when cooking.    Avoid alcohol, soda, and energy drinks. Limit tea and coffee.

## 2025-04-17 NOTE — PROGRESS NOTES
[facility] [place]    HPI  Cheryl Solis, 69 y.o. female, presents to for follow up for FAYE on CKD.  Patient recently started on Aldactone.  PCP sent labs for worsening renal function.  We called patient in for a sooner follow up.  She reports adequate water intake.  Reports occasional ibuprofen use with migraines not resolved with Ubrelvy. Denies any lower extremity edema.  Only complaint is decreased appetite and weight loss.    Patient denies  denies recent episode of dehydration, diarrhea, vomiting, acute illness, hospitalization, recent angiograms or exposure to IV radiocontrast.        Medical Diagnoses:   Past Medical History:   Diagnosis Date    Anemia, unspecified     Fibromyalgia     HTN (hypertension)     Mixed hyperlipidemia     Obesity, unspecified     Sickle-cell disease without crisis     Sleep apnea, unspecified     Type 2 diabetes mellitus without complications     Vitamin D deficiency      Patient Active Problem List   Diagnosis    Sickle-cell trait    Peripheral vascular disease, unspecified    MCTD (mixed connective tissue disease)    Degenerative lumbar spinal stenosis    Migraine headache    Iron deficiency anemia    Renal insufficiency    Vitamin D deficiency    Primary hypertension    Sickle cell trait    Fibromyalgia syndrome    Insomnia    Neuropathy    Drug-induced immunodeficiency    Asthma, persistent controlled    Stage 3b chronic kidney disease    Osteoporosis without current pathological fracture    CKD (chronic kidney disease) stage 2, GFR 60-89 ml/min    Poor appetite    Controlled type 2 diabetes mellitus with diabetic polyneuropathy, without long-term current use of insulin    Pruritus    Hyperlipidemia associated with type 2 diabetes mellitus    Bilateral hip pain    Low TSH level    Abnormal results of thyroid function studies    FAYE (acute kidney injury)       Surgical History:   Past Surgical History:   Procedure Laterality Date    BACK SURGERY      CARPAL  TUNNEL RELEASE Bilateral      SECTION       SECTION       SECTION      COLONOSCOPY      COLONOSCOPY W/ POLYPECTOMY  2013    FOOT SURGERY Left     lasar eye       SYMPATHECTOMY         Family History:   Family History   Problem Relation Name Age of Onset    Cancer Mother      Stroke Sister      Hypertension Sister      Drug abuse Brother      Alcohol abuse Brother         Social History:   Social History     Tobacco Use    Smoking status: Never     Passive exposure: Never    Smokeless tobacco: Never   Substance Use Topics    Alcohol use: Not Currently       Allergies:  Review of patient's allergies indicates:   Allergen Reactions    Metformin Rash     Rash (skin)^  Rash (skin)^      Morphine Itching     Itchy (skin)^  Itchy (skin)^      Shellfish containing products     Vascepa [icosapent ethyl] Hives    Ibuprofen Rash     Hives (skin)^  Hives (skin)^         Medications:  Outpatient Medications Marked as Taking for the 25 encounter (Office Visit) with Eder Rivera FNP   Medication Sig Dispense Refill    albuterol (PROVENTIL) 2.5 mg /3 mL (0.083 %) nebulizer solution use one vial in nebulizer every 4 to 6 hours as needed for wheezing 75 mL 11    albuterol (PROVENTIL/VENTOLIN HFA) 90 mcg/actuation inhaler Inhale 2 puffs into the lungs every 6 (six) hours as needed for Wheezing. Rescue 18 g 11    amlodipine-benazepril 5-10 mg (LOTREL) 5-10 mg per capsule TAKE ONE CAPSULE BY MOUTH DAILY 90 capsule 3    aspirin (ECOTRIN) 81 MG EC tablet TAKE ONE TABLET BY MOUTH DAILY 30 tablet 5    azelastine (OPTIVAR) 0.05 % ophthalmic solution Place 1 drop into both eyes 2 (two) times daily.      blood pressure monitor Kit 1 each by Misc.(Non-Drug; Combo Route) route once daily. 1 each 0    blood sugar diagnostic Strp To check BG daily, to use with insurance preferred meter 100 strip 11    blood-glucose meter kit To check BG daily, to use with insurance preferred meter 1 each 0    donepeziL  (ARICEPT) 5 MG tablet Take 1 tablet (5 mg total) by mouth every evening. 30 tablet 5    EPINEPHrine (EPIPEN) 0.3 mg/0.3 mL AtIn Inject 1 each into the muscle daily as needed.      evolocumab (REPATHA SURECLICK) 140 mg/mL PnIj Inject 1 mL (140 mg total) into the skin every 14 (fourteen) days. 2 mL 11    ferrous gluconate (FERGON) 324 MG tablet Take 1 tablet (324 mg total) by mouth 3 (three) times daily with meals. 90 tablet 11    fluticasone propionate (FLONASE) 50 mcg/actuation nasal spray 2 sprays by Each Nostril route once daily.      gabapentin (NEURONTIN) 100 MG capsule Take 1 capsule (100 mg total) by mouth 3 (three) times daily. 270 capsule 3    hydroCHLOROthiazide (HYDRODIURIL) 25 MG tablet TAKE ONE TABLET BY MOUTH TWICE DAILY 180 tablet 3    hydrOXYchloroQUINE (PLAQUENIL) 200 mg tablet Take 1 tablet (200 mg total) by mouth 2 (two) times daily. 180 tablet 3    hydrOXYzine HCL (ATARAX) 25 MG tablet Take 25 mg by mouth.      JANUMET  mg per tablet Take 1 tablet by mouth 2 (two) times daily with meals. 180 tablet 3    lancets Misc To check BG daily, to use with insurance preferred meter 100 each 11    levocetirizine (XYZAL) 5 MG tablet Take 5 mg by mouth every evening.      montelukast (SINGULAIR) 10 mg tablet Take 10 mg by mouth once daily.      ondansetron (ZOFRAN) 4 MG tablet Take 1 tablet (4 mg total) by mouth every 6 (six) hours. 12 tablet 0    tiZANidine 4 mg Cap 1 tablet as needed Orally twice a day      UBRELVY 100 mg tablet Take 100 mg by mouth as needed.      WESTAB ONE 2.5-25-1 mg Tab Take by mouth once daily.           Review of Systems:    Constitutional: Denies fever, fatigue, generalized weakness  Skin: Denies wounds, no rashes, no itching, no new skin lesions  Respiratory:  Denies cough, shortness of breath, or wheezing  Cardiovascular: Denies chest pain, palpitations, or swelling  Gastrointestional: Denies abdominal pain, nausea, vomiting, diarrhea, or constipation  Genitourinary: Denies  dysuria, hematuria, foamy urine, or incontinence; reports able to empty bladder  Musculoskeletal: Denies back or flank pain  Neurological: Denies headaches, dizziness, paresthesias, tremors or focal weakness      Vital Signs:  /64 (BP Location: Left arm, Patient Position: Sitting)   Pulse 66   Temp 98.5 °F (36.9 °C) (Oral)   Resp 20   Ht 5' (1.524 m)   Wt 67 kg (147 lb 9.6 oz)   SpO2 100%   BMI 28.83 kg/m²   Body mass index is 28.83 kg/m².      Physical Exam:    General: no acute distress, awake, alert  Eyes: PERRLA, conjunctiva clear, eyelids without swelling  HENT: atraumatic, oropharynx and nasal mucosa patent  Neck: supple, trache midline, full ROM, no JVD  Respiratory: equal, unlabored, clear to auscultation A/P  Cardiovascular: RRR without murmur or rub;  Edema: none  Gastrointestinal: soft, non-tender, non-distended  Musculoskeletal: ROM without new limitation or discomfort  Integumentary: warm, dry; no rashes, wounds, or skin lesions; +++ skin tenting  Neurological: oriented x4, appropriate, no acute deficits; no asterixis      Labs:        Component Value Date/Time     04/14/2025 0745     04/07/2025 1155    K 4.0 04/14/2025 0745    K 4.6 04/07/2025 1155     (H) 04/14/2025 0745     (H) 04/07/2025 1155    CO2 25 04/14/2025 0745    CO2 24 04/07/2025 1155    BUN 33.2 (H) 04/14/2025 0745    BUN 24.8 (H) 04/07/2025 1155    CREATININE 1.77 (H) 04/14/2025 0745    CREATININE 1.61 (H) 04/07/2025 1155    CREATININE 0.89 12/09/2024 0821    CREATININE 1.00 05/30/2024 0751    CALCIUM 9.7 04/14/2025 0745    CALCIUM 9.4 04/07/2025 1155    PTH 59.1 05/30/2024 0751           Component Value Date/Time    WBC 9.41 04/07/2025 1155    WBC 5.63 12/09/2024 0821    HGB 10.8 (L) 04/07/2025 1155    HGB 10.4 (L) 12/09/2024 0821    HCT 30.8 (L) 04/07/2025 1155    HCT 28.9 (L) 12/09/2024 0821     04/07/2025 1155     12/09/2024 0821         Imaging:  Retroperitoneal US:    Impression:      No significant abnormalities identified     Date:                                            04/29/2024  Impression:    1. FAYE (acute kidney injury)    2. CKD (chronic kidney disease) stage 2, GFR 60-89 ml/min    3. Primary hypertension    4. Vitamin D deficiency        Baseline CKD 2 Likely secondary to nephrosclerosis    Now with the FAYE related to over-diuresis    No significant proteinuria  Mixed connective tissue disorder and fibromyalgia previously managed by .  Maintained on Plaquenil;Positive JOCELYN; urine sediment not suggestive of any lupus involvement    DM2 managed per PCP.    Complains of no appetite    Plan:  Discontinue spironolactone  Discontinue hydrochlorothiazide    Start vitamin-D supplementation    Follow up with PCP regarding low appetite, weight loss, and low TSH    Adequate fluid intake  Refrain from NSAIDs    Monitor blood pressure at home and let us know if not at goal    Follow up in 3 weeks with labs    Eder DAVISON        This note was created with the assistance of [employer] voice recognition software or phone dictation. There may be transcription errors as a result of using this technology however minimal. Effort has been made to assure accuracy of transcription but any obvious errors or omissions should be clarified with the author of the document.

## 2025-04-22 ENCOUNTER — OUTPATIENT CASE MANAGEMENT (OUTPATIENT)
Dept: ADMINISTRATIVE | Facility: OTHER | Age: 70
End: 2025-04-22
Payer: MEDICARE

## 2025-04-28 ENCOUNTER — HOSPITAL ENCOUNTER (OUTPATIENT)
Dept: RADIOLOGY | Facility: HOSPITAL | Age: 70
Discharge: HOME | End: 2025-04-28
Attending: FAMILY MEDICINE
Payer: MEDICARE

## 2025-04-28 DIAGNOSIS — D50.9 IRON DEFICIENCY ANEMIA, UNSPECIFIED IRON DEFICIENCY ANEMIA TYPE: ICD-10-CM

## 2025-04-28 DIAGNOSIS — E11.42 CONTROLLED TYPE 2 DIABETES MELLITUS WITH DIABETIC POLYNEUROPATHY, WITHOUT LONG-TERM CURRENT USE OF INSULIN: ICD-10-CM

## 2025-04-28 DIAGNOSIS — D57.3 SICKLE-CELL TRAIT: ICD-10-CM

## 2025-04-28 DIAGNOSIS — Z12.31 VISIT FOR SCREENING MAMMOGRAM: ICD-10-CM

## 2025-04-28 PROCEDURE — 77063 BREAST TOMOSYNTHESIS BI: CPT | Mod: 26,,, | Performed by: RADIOLOGY

## 2025-04-28 PROCEDURE — 77067 SCR MAMMO BI INCL CAD: CPT | Mod: 26,,, | Performed by: RADIOLOGY

## 2025-04-28 PROCEDURE — 77067 SCR MAMMO BI INCL CAD: CPT | Mod: TC

## 2025-04-28 RX ORDER — FERROUS GLUCONATE 324(38)MG
1 TABLET ORAL 3 TIMES DAILY
Qty: 90 TABLET | Refills: 11 | Status: SHIPPED | OUTPATIENT
Start: 2025-04-28

## 2025-04-28 RX ORDER — SITAGLIPTIN AND METFORMIN HYDROCHLORIDE 500; 50 MG/1; MG/1
1 TABLET, FILM COATED ORAL
Qty: 180 TABLET | Refills: 11 | Status: SHIPPED | OUTPATIENT
Start: 2025-04-28

## 2025-04-29 ENCOUNTER — TELEPHONE (OUTPATIENT)
Dept: FAMILY MEDICINE | Facility: CLINIC | Age: 70
End: 2025-04-29
Payer: MEDICARE

## 2025-04-29 ENCOUNTER — RESULTS FOLLOW-UP (OUTPATIENT)
Dept: FAMILY MEDICINE | Facility: CLINIC | Age: 70
End: 2025-04-29

## 2025-04-29 NOTE — TELEPHONE ENCOUNTER
----- Message from Monica Max MD sent at 4/29/2025  9:55 AM CDT -----  Mammogram shows benign findings, no mammographic evidence of malignancy, routine Mammogram in 04/2026.      Thank you for choosing [facility] for your medical needs. Have a great day!   -Monica Max MD, [facility]    ----- Message -----  From: Interface, Rad Results In  Sent: 4/29/2025   6:15 AM CDT  To: Monica Max MD

## 2025-05-07 ENCOUNTER — LAB VISIT (OUTPATIENT)
Dept: LAB | Facility: HOSPITAL | Age: 70
End: 2025-05-07
Payer: MEDICARE

## 2025-05-07 DIAGNOSIS — E55.9 VITAMIN D DEFICIENCY: ICD-10-CM

## 2025-05-07 DIAGNOSIS — I10 PRIMARY HYPERTENSION: ICD-10-CM

## 2025-05-07 DIAGNOSIS — N17.9 AKI (ACUTE KIDNEY INJURY): ICD-10-CM

## 2025-05-07 DIAGNOSIS — N18.2 CKD (CHRONIC KIDNEY DISEASE) STAGE 2, GFR 60-89 ML/MIN: ICD-10-CM

## 2025-05-07 LAB
ALBUMIN SERPL-MCNC: 4.1 G/DL (ref 3.4–4.8)
ALBUMIN/GLOB SERPL: 1.1 RATIO (ref 1.1–2)
ALP SERPL-CCNC: 45 UNIT/L (ref 40–150)
ALT SERPL-CCNC: 10 UNIT/L (ref 0–55)
ANION GAP SERPL CALC-SCNC: 5 MEQ/L
AST SERPL-CCNC: 16 UNIT/L (ref 11–45)
BASOPHILS # BLD AUTO: 0.02 X10(3)/MCL
BASOPHILS NFR BLD AUTO: 0.4 %
BILIRUB SERPL-MCNC: 0.5 MG/DL
BUN SERPL-MCNC: 9 MG/DL (ref 9.8–20.1)
CALCIUM SERPL-MCNC: 9.7 MG/DL (ref 8.4–10.2)
CHLORIDE SERPL-SCNC: 110 MMOL/L (ref 98–107)
CO2 SERPL-SCNC: 25 MMOL/L (ref 23–31)
CREAT SERPL-MCNC: 0.95 MG/DL (ref 0.55–1.02)
CREAT/UREA NIT SERPL: 9
EOSINOPHIL # BLD AUTO: 0.2 X10(3)/MCL (ref 0–0.9)
EOSINOPHIL NFR BLD AUTO: 3.9 %
ERYTHROCYTE [DISTWIDTH] IN BLOOD BY AUTOMATED COUNT: 13.3 % (ref 11.5–17)
GFR SERPLBLD CREATININE-BSD FMLA CKD-EPI: >60 ML/MIN/1.73/M2
GLOBULIN SER-MCNC: 3.6 GM/DL (ref 2.4–3.5)
GLUCOSE SERPL-MCNC: 96 MG/DL (ref 82–115)
HCT VFR BLD AUTO: 28.9 % (ref 37–47)
HGB BLD-MCNC: 10.1 G/DL (ref 12–16)
IMM GRANULOCYTES # BLD AUTO: 0 X10(3)/MCL (ref 0–0.04)
IMM GRANULOCYTES NFR BLD AUTO: 0 %
LYMPHOCYTES # BLD AUTO: 1.63 X10(3)/MCL (ref 0.6–4.6)
LYMPHOCYTES NFR BLD AUTO: 32 %
MCH RBC QN AUTO: 28.4 PG (ref 27–31)
MCHC RBC AUTO-ENTMCNC: 34.9 G/DL (ref 33–36)
MCV RBC AUTO: 81.2 FL (ref 80–94)
MONOCYTES # BLD AUTO: 0.33 X10(3)/MCL (ref 0.1–1.3)
MONOCYTES NFR BLD AUTO: 6.5 %
NEUTROPHILS # BLD AUTO: 2.91 X10(3)/MCL (ref 2.1–9.2)
NEUTROPHILS NFR BLD AUTO: 57.2 %
PLATELET # BLD AUTO: 336 X10(3)/MCL (ref 130–400)
PMV BLD AUTO: 10.1 FL (ref 7.4–10.4)
POTASSIUM SERPL-SCNC: 4.6 MMOL/L (ref 3.5–5.1)
PROT SERPL-MCNC: 7.7 GM/DL (ref 5.8–7.6)
RBC # BLD AUTO: 3.56 X10(6)/MCL (ref 4.2–5.4)
SODIUM SERPL-SCNC: 140 MMOL/L (ref 136–145)
TSH SERPL-ACNC: 0.15 UIU/ML (ref 0.35–4.94)
WBC # BLD AUTO: 5.09 X10(3)/MCL (ref 4.5–11.5)

## 2025-05-07 PROCEDURE — 80053 COMPREHEN METABOLIC PANEL: CPT

## 2025-05-07 PROCEDURE — 84443 ASSAY THYROID STIM HORMONE: CPT

## 2025-05-07 PROCEDURE — 36415 COLL VENOUS BLD VENIPUNCTURE: CPT

## 2025-05-07 PROCEDURE — 85025 COMPLETE CBC W/AUTO DIFF WBC: CPT

## 2025-05-13 ENCOUNTER — OFFICE VISIT (OUTPATIENT)
Dept: NEPHROLOGY | Facility: CLINIC | Age: 70
End: 2025-05-13
Payer: MEDICARE

## 2025-05-13 VITALS
RESPIRATION RATE: 18 BRPM | HEART RATE: 64 BPM | TEMPERATURE: 98 F | DIASTOLIC BLOOD PRESSURE: 71 MMHG | HEIGHT: 60 IN | OXYGEN SATURATION: 100 % | SYSTOLIC BLOOD PRESSURE: 129 MMHG | WEIGHT: 143 LBS | BODY MASS INDEX: 28.07 KG/M2

## 2025-05-13 DIAGNOSIS — N17.9 AKI (ACUTE KIDNEY INJURY): Primary | ICD-10-CM

## 2025-05-13 DIAGNOSIS — E55.9 VITAMIN D DEFICIENCY: ICD-10-CM

## 2025-05-13 DIAGNOSIS — N18.2 CKD (CHRONIC KIDNEY DISEASE) STAGE 2, GFR 60-89 ML/MIN: ICD-10-CM

## 2025-05-13 DIAGNOSIS — I10 PRIMARY HYPERTENSION: ICD-10-CM

## 2025-05-13 PROCEDURE — 99999 PR PBB SHADOW E&M-EST. PATIENT-LVL V: CPT | Mod: PBBFAC,,, | Performed by: INTERNAL MEDICINE

## 2025-05-13 PROCEDURE — 99215 OFFICE O/P EST HI 40 MIN: CPT | Mod: PBBFAC | Performed by: INTERNAL MEDICINE

## 2025-05-13 NOTE — PROGRESS NOTES
[facility] Ambulatory Progress Note      HPI  Cheryl Solis, 69 y.o. female, presents to office for a follow up visit.  Patient had FAYE however diuretics were stopped  Currently here for follow-up she feels fine denies any major complaints    Patient denies taking NSAIDs or new antibiotics, recent episode of dehydration, diarrhea, vomiting, acute illness, hospitalization, recent angiograms or exposure to IV radiocontrast.        Medical Diagnoses:   Past Medical History:   Diagnosis Date    Anemia, unspecified     Fibromyalgia     HTN (hypertension)     Mixed hyperlipidemia     Obesity, unspecified     Sickle-cell disease without crisis     Sleep apnea, unspecified     Type 2 diabetes mellitus without complications     Vitamin D deficiency      Problem List[1]    Surgical History:   Past Surgical History:   Procedure Laterality Date    BACK SURGERY      CARPAL TUNNEL RELEASE Bilateral      SECTION       SECTION       SECTION      COLONOSCOPY      COLONOSCOPY W/ POLYPECTOMY  2013    FOOT SURGERY Left     lasar eye       SYMPATHECTOMY         Family History:   Family History   Problem Relation Name Age of Onset    Cancer Mother      Stroke Sister      Hypertension Sister      Drug abuse Brother      Alcohol abuse Brother         Social History:   Social History     Tobacco Use    Smoking status: Never     Passive exposure: Never    Smokeless tobacco: Never   Substance Use Topics    Alcohol use: Not Currently       Allergies:  Review of patient's allergies indicates:   Allergen Reactions    Metformin Rash     Rash (skin)^  Rash (skin)^      Morphine Itching     Itchy (skin)^  Itchy (skin)^      Shellfish containing products     Vascepa [icosapent ethyl] Hives    Ibuprofen Rash     Hives (skin)^  Hives (skin)^         Medications:  Outpatient Medications Marked as Taking for the 25 encounter (Office Visit) with Meri Pereyra MD   Medication Sig Dispense Refill    albuterol  (PROVENTIL) 2.5 mg /3 mL (0.083 %) nebulizer solution use one vial in nebulizer every 4 to 6 hours as needed for wheezing 75 mL 11    albuterol (PROVENTIL/VENTOLIN HFA) 90 mcg/actuation inhaler Inhale 2 puffs into the lungs every 6 (six) hours as needed for Wheezing. Rescue 18 g 11    amlodipine-benazepril 5-10 mg (LOTREL) 5-10 mg per capsule TAKE ONE CAPSULE BY MOUTH DAILY 90 capsule 3    aspirin (ECOTRIN) 81 MG EC tablet TAKE ONE TABLET BY MOUTH DAILY 30 tablet 5    azelastine (OPTIVAR) 0.05 % ophthalmic solution Place 1 drop into both eyes 2 (two) times daily.      blood pressure monitor Kit 1 each by Misc.(Non-Drug; Combo Route) route once daily. 1 each 0    blood sugar diagnostic Strp To check BG daily, to use with insurance preferred meter 100 strip 11    blood-glucose meter kit To check BG daily, to use with insurance preferred meter 1 each 0    donepeziL (ARICEPT) 5 MG tablet Take 1 tablet (5 mg total) by mouth every evening. 30 tablet 5    EPINEPHrine (EPIPEN) 0.3 mg/0.3 mL AtIn Inject 1 each into the muscle daily as needed.      evolocumab (REPATHA SURECLICK) 140 mg/mL PnIj Inject 1 mL (140 mg total) into the skin every 14 (fourteen) days. 2 mL 11    ferrous gluconate (FERGON) 324 MG tablet TAKE ONE TABLET BY MOUTH THREE TIMES DAILY WITH MEALS 90 tablet 11    fluticasone propionate (FLONASE) 50 mcg/actuation nasal spray 2 sprays by Each Nostril route once daily.      gabapentin (NEURONTIN) 100 MG capsule Take 1 capsule (100 mg total) by mouth 3 (three) times daily. 270 capsule 3    hydrOXYchloroQUINE (PLAQUENIL) 200 mg tablet Take 1 tablet (200 mg total) by mouth 2 (two) times daily. 180 tablet 3    hydrOXYzine HCL (ATARAX) 25 MG tablet Take 25 mg by mouth.      JANUMET  mg per tablet Take 1 tablet by mouth two times daily with meals. 180 tablet 11    lancets Mis To check BG daily, to use with insurance preferred meter 100 each 11    levocetirizine (XYZAL) 5 MG tablet Take 5 mg by mouth every  evening.      montelukast (SINGULAIR) 10 mg tablet Take 10 mg by mouth once daily.      ondansetron (ZOFRAN) 4 MG tablet Take 1 tablet (4 mg total) by mouth every 6 (six) hours. (Patient taking differently: Take 4 mg by mouth every 6 (six) hours as needed.) 12 tablet 0    UBRELVY 100 mg tablet Take 100 mg by mouth as needed.      WESTAB ONE 2.5-25-1 mg Tab Take by mouth once daily.            Review of Systems:    Constitutional: Denies fever, fatigue, generalized weakness  Skin: Denies wounds, no rashes, no itching, no new skin lesions  Respiratory:  Denies cough, shortness of breath, or wheezing  Cardiovascular: Denies chest pain, palpitations, or swelling  Gastrointestional: Denies abdominal pain, nausea, vomiting, diarrhea, or constipation  Genitourinary: Denies dysuria, hematuria, foamy urine, or incontinence; reports able to empty bladder  Musculoskeletal: Denies back or flank pain  Neurological: Denies headaches, dizziness, paresthesias, tremors or focal weakness      Vital Signs:  /71 (BP Location: Right arm, Patient Position: Sitting)   Pulse 64   Temp 98.2 °F (36.8 °C) (Oral)   Resp 18   Ht 5' (1.524 m)   Wt 64.9 kg (143 lb)   SpO2 100%   BMI 27.93 kg/m²   Body mass index is 27.93 kg/m².      Physical Exam:    General: no acute distress, awake, alert  Eyes: conjunctiva clear, eyelids without swelling  HENT: atraumatic, oropharynx and nasal mucosa patent  Neck: supple, trache midline, no JVD  Respiratory: equal, unlabored, clear to auscultation A/P  Cardiovascular: RRR without murmur or rub  Edema: none  Gastrointestinal: soft, non-tender, non-distended; positive bowel sounds; no masses to palpation; no ascites  Genitourinary: no CVA tenderness upon palpation  Musculoskeletal: ROM without new limitation or discomfort  Integumentary: warm, dry; no rashes, wounds, or skin lesions  Neurological: oriented x4, appropriate, no acute deficits; no asterixis      Labs:    Estimated Creatinine Clearance:  46.4 mL/min (based on SCr of 0.95 mg/dL).         Component Value Date/Time     05/07/2025 1035     04/14/2025 0745    K 4.6 05/07/2025 1035    K 4.0 04/14/2025 0745     (H) 05/07/2025 1035     (H) 04/14/2025 0745    CO2 25 05/07/2025 1035    CO2 25 04/14/2025 0745    BUN 9.0 (L) 05/07/2025 1035    BUN 33.2 (H) 04/14/2025 0745    CREATININE 0.95 05/07/2025 1035    CREATININE 1.77 (H) 04/14/2025 0745    CREATININE 1.61 (H) 04/07/2025 1155    CREATININE 0.89 12/09/2024 0821    CALCIUM 9.7 05/07/2025 1035    CALCIUM 9.7 04/14/2025 0745    PTH 59.1 05/30/2024 0751           Component Value Date/Time    WBC 5.09 05/07/2025 1035    WBC 9.41 04/07/2025 1155    HGB 10.1 (L) 05/07/2025 1035    HGB 10.8 (L) 04/07/2025 1155    HCT 28.9 (L) 05/07/2025 1035    HCT 30.8 (L) 04/07/2025 1155     05/07/2025 1035     04/07/2025 1155       Urine Creatinine   Date Value Ref Range Status   04/07/2025 225.0 (H) 45.0 - 106.0 mg/dL Final   12/09/2024 105.2 45.0 - 106.0 mg/dL Final       Urine Protein Level   Date Value Ref Range Status   12/09/2024 7.7 mg/dL Final   05/30/2024 9.1 mg/dL Final           Imaging:  Retroperitoneal Ultrasound:      Impression:    1. FAYE (acute kidney injury)        2. CKD (chronic kidney disease) stage 2, GFR 60-89 ml/min        3. Primary hypertension        4. Vitamin D deficiency        FAYE resolved those likely related to diuresis  Lupus with no evidence of nephritis        Plan:  Renal wise stable  Follow-up with labs in 4 months       Meri Pereyra      This note was created with the assistance of [employer] voice recognition software or phone dictation. There may be transcription errors as a result of using this technology however minimal. Effort has been made to assure accuracy of transcription but any obvious errors or omissions should be clarified with the author of the document.          [1]   Patient Active Problem List  Diagnosis    Sickle-cell trait    Peripheral  vascular disease, unspecified    MCTD (mixed connective tissue disease)    Degenerative lumbar spinal stenosis    Migraine headache    Iron deficiency anemia    Renal insufficiency    Vitamin D deficiency    Primary hypertension    Sickle cell trait    Fibromyalgia syndrome    Insomnia    Neuropathy    Drug-induced immunodeficiency    Asthma, persistent controlled    Stage 3b chronic kidney disease    Osteoporosis without current pathological fracture    CKD (chronic kidney disease) stage 2, GFR 60-89 ml/min    Poor appetite    Controlled type 2 diabetes mellitus with diabetic polyneuropathy, without long-term current use of insulin    Pruritus    Hyperlipidemia associated with type 2 diabetes mellitus    Bilateral hip pain    Low TSH level    Abnormal results of thyroid function studies    FAYE (acute kidney injury)

## 2025-06-09 ENCOUNTER — PATIENT OUTREACH (OUTPATIENT)
Facility: CLINIC | Age: 70
End: 2025-06-09
Payer: MEDICARE

## 2025-06-24 ENCOUNTER — TELEPHONE (OUTPATIENT)
Dept: FAMILY MEDICINE | Facility: CLINIC | Age: 70
End: 2025-06-24
Payer: MEDICARE

## 2025-06-24 DIAGNOSIS — E11.42 CONTROLLED TYPE 2 DIABETES MELLITUS WITH DIABETIC POLYNEUROPATHY, WITHOUT LONG-TERM CURRENT USE OF INSULIN: Primary | ICD-10-CM

## 2025-06-24 NOTE — TELEPHONE ENCOUNTER
Good afternoon patient called requesting an order for diabetic shoes to be sent to [facility] in [place], please advise thanks !

## 2025-07-17 LAB
LEFT EYE DM RETINOPATHY: NEGATIVE
RIGHT EYE DM RETINOPATHY: NEGATIVE

## 2025-07-18 ENCOUNTER — DOCUMENTATION ONLY (OUTPATIENT)
Dept: FAMILY MEDICINE | Facility: CLINIC | Age: 70
End: 2025-07-18

## 2025-07-28 ENCOUNTER — PATIENT OUTREACH (OUTPATIENT)
Facility: CLINIC | Age: 70
End: 2025-07-28
Payer: MEDICARE

## 2025-07-28 NOTE — LETTER
AUTHORIZATION FOR RELEASE OF   CONFIDENTIAL INFORMATION        We are seeing Cheryl Solis, date of birth 1955, in the clinic at [facility]. Monica Max MD is the patient's PCP. Cheryl Solis has an outstanding lab/procedure at the time we reviewed her chart. In order to help keep her health information updated, she has authorized us to request the following medical record(s):                                       ( X )  OUTSIDE LAB RESULTS-Hepatitis C         Please fax records to [facility], [place], Cassandra M, MD  at [phone] or email to [email or web address]                Patient Name: Cheryl Solis  : 1955  Patient Phone #: [phone]